# Patient Record
Sex: FEMALE | Race: OTHER | HISPANIC OR LATINO | Employment: UNEMPLOYED | URBAN - METROPOLITAN AREA
[De-identification: names, ages, dates, MRNs, and addresses within clinical notes are randomized per-mention and may not be internally consistent; named-entity substitution may affect disease eponyms.]

---

## 2017-01-20 ENCOUNTER — GENERIC CONVERSION - ENCOUNTER (OUTPATIENT)
Dept: OTHER | Facility: OTHER | Age: 32
End: 2017-01-20

## 2017-01-22 ENCOUNTER — GENERIC CONVERSION - ENCOUNTER (OUTPATIENT)
Dept: OTHER | Facility: OTHER | Age: 32
End: 2017-01-22

## 2017-02-02 ENCOUNTER — ALLSCRIPTS OFFICE VISIT (OUTPATIENT)
Dept: OTHER | Facility: OTHER | Age: 32
End: 2017-02-02

## 2017-04-21 DIAGNOSIS — G40.909 EPILEPSY WITHOUT STATUS EPILEPTICUS, NOT INTRACTABLE (HCC): ICD-10-CM

## 2017-04-21 DIAGNOSIS — R51 HEADACHE(784.0): ICD-10-CM

## 2017-04-21 DIAGNOSIS — M83.5 OTHER DRUG-INDUCED OSTEOMALACIA IN ADULTS: ICD-10-CM

## 2017-04-21 DIAGNOSIS — J32.1 CHRONIC FRONTAL SINUSITIS: ICD-10-CM

## 2017-05-05 ENCOUNTER — GENERIC CONVERSION - ENCOUNTER (OUTPATIENT)
Dept: OTHER | Facility: OTHER | Age: 32
End: 2017-05-05

## 2017-05-05 ENCOUNTER — LAB CONVERSION - ENCOUNTER (OUTPATIENT)
Dept: OTHER | Facility: OTHER | Age: 32
End: 2017-05-05

## 2017-05-05 LAB — CARBAMAZEPINE LEVEL (HISTORICAL): 4.2 MG/L (ref 4–12)

## 2017-05-11 ENCOUNTER — ALLSCRIPTS OFFICE VISIT (OUTPATIENT)
Dept: OTHER | Facility: OTHER | Age: 32
End: 2017-05-11

## 2017-05-11 ENCOUNTER — TRANSCRIBE ORDERS (OUTPATIENT)
Dept: ADMINISTRATIVE | Facility: HOSPITAL | Age: 32
End: 2017-05-11

## 2017-05-11 DIAGNOSIS — J32.1 CHRONIC FRONTAL SINUSITIS: Primary | ICD-10-CM

## 2017-05-11 DIAGNOSIS — R51.9 FACIAL PAIN: ICD-10-CM

## 2017-05-25 ENCOUNTER — GENERIC CONVERSION - ENCOUNTER (OUTPATIENT)
Dept: OTHER | Facility: OTHER | Age: 32
End: 2017-05-25

## 2017-05-25 ENCOUNTER — HOSPITAL ENCOUNTER (OUTPATIENT)
Dept: RADIOLOGY | Facility: HOSPITAL | Age: 32
Discharge: HOME | End: 2017-05-25
Attending: PSYCHIATRY & NEUROLOGY
Payer: COMMERCIAL

## 2017-05-25 DIAGNOSIS — G40.909 EPILEPSY WITHOUT STATUS EPILEPTICUS, NOT INTRACTABLE (HCC): ICD-10-CM

## 2017-05-25 DIAGNOSIS — J32.1 CHRONIC FRONTAL SINUSITIS: ICD-10-CM

## 2017-05-25 DIAGNOSIS — R51 HEADACHE(784.0): ICD-10-CM

## 2017-05-25 PROCEDURE — 70551 MRI BRAIN STEM W/O DYE: CPT

## 2017-07-30 ENCOUNTER — HOSPITAL ENCOUNTER (EMERGENCY)
Facility: HOSPITAL | Age: 32
Discharge: HOME | End: 2017-07-31
Attending: EMERGENCY MEDICINE | Admitting: EMERGENCY MEDICINE
Payer: COMMERCIAL

## 2017-07-30 VITALS
SYSTOLIC BLOOD PRESSURE: 130 MMHG | OXYGEN SATURATION: 98 % | DIASTOLIC BLOOD PRESSURE: 74 MMHG | TEMPERATURE: 98 F | WEIGHT: 135 LBS | HEART RATE: 76 BPM | RESPIRATION RATE: 18 BRPM | BODY MASS INDEX: 27.27 KG/M2

## 2017-07-30 DIAGNOSIS — M54.2 NECK PAIN: Primary | ICD-10-CM

## 2017-07-30 RX ORDER — KETOROLAC TROMETHAMINE 30 MG/ML
15 INJECTION, SOLUTION INTRAMUSCULAR; INTRAVENOUS ONCE
Status: COMPLETED | OUTPATIENT
Start: 2017-07-30 | End: 2017-07-31

## 2017-07-31 ENCOUNTER — APPOINTMENT (EMERGENCY)
Dept: RADIOLOGY | Facility: HOSPITAL | Age: 32
End: 2017-07-31
Payer: COMMERCIAL

## 2017-07-31 PROCEDURE — 96372 THER/PROPH/DIAG INJ SC/IM: CPT

## 2017-07-31 PROCEDURE — 99284 EMERGENCY DEPT VISIT MOD MDM: CPT

## 2017-07-31 PROCEDURE — 72040 X-RAY EXAM NECK SPINE 2-3 VW: CPT

## 2017-07-31 RX ADMIN — KETOROLAC TROMETHAMINE 15 MG: 30 INJECTION, SOLUTION INTRAMUSCULAR at 00:41

## 2017-08-10 ENCOUNTER — ALLSCRIPTS OFFICE VISIT (OUTPATIENT)
Dept: OTHER | Facility: OTHER | Age: 32
End: 2017-08-10

## 2017-08-10 DIAGNOSIS — S16.1XXD STRAIN OF MUSCLE, FASCIA AND TENDON AT NECK LEVEL, SUBSEQUENT ENCOUNTER: ICD-10-CM

## 2017-08-21 ENCOUNTER — APPOINTMENT (OUTPATIENT)
Dept: PHYSICAL THERAPY | Facility: REHABILITATION | Age: 32
End: 2017-08-21
Payer: COMMERCIAL

## 2017-08-21 DIAGNOSIS — S16.1XXD STRAIN OF MUSCLE, FASCIA AND TENDON AT NECK LEVEL, SUBSEQUENT ENCOUNTER: ICD-10-CM

## 2017-08-21 PROCEDURE — 97162 PT EVAL MOD COMPLEX 30 MIN: CPT

## 2017-08-21 PROCEDURE — 97110 THERAPEUTIC EXERCISES: CPT

## 2017-08-22 ENCOUNTER — GENERIC CONVERSION - ENCOUNTER (OUTPATIENT)
Dept: OTHER | Facility: OTHER | Age: 32
End: 2017-08-22

## 2017-08-23 ENCOUNTER — APPOINTMENT (OUTPATIENT)
Dept: PHYSICAL THERAPY | Facility: REHABILITATION | Age: 32
End: 2017-08-23
Payer: COMMERCIAL

## 2017-08-23 PROCEDURE — 97140 MANUAL THERAPY 1/> REGIONS: CPT

## 2017-08-23 PROCEDURE — 97110 THERAPEUTIC EXERCISES: CPT

## 2017-08-29 ENCOUNTER — APPOINTMENT (OUTPATIENT)
Dept: PHYSICAL THERAPY | Facility: REHABILITATION | Age: 32
End: 2017-08-29
Payer: COMMERCIAL

## 2017-08-29 PROCEDURE — 97140 MANUAL THERAPY 1/> REGIONS: CPT

## 2017-08-29 PROCEDURE — 97110 THERAPEUTIC EXERCISES: CPT

## 2017-09-01 ENCOUNTER — APPOINTMENT (OUTPATIENT)
Dept: PHYSICAL THERAPY | Facility: REHABILITATION | Age: 32
End: 2017-09-01
Payer: COMMERCIAL

## 2017-09-05 ENCOUNTER — APPOINTMENT (OUTPATIENT)
Dept: PHYSICAL THERAPY | Facility: REHABILITATION | Age: 32
End: 2017-09-05
Payer: COMMERCIAL

## 2017-09-05 PROCEDURE — 97140 MANUAL THERAPY 1/> REGIONS: CPT

## 2017-09-05 PROCEDURE — 97110 THERAPEUTIC EXERCISES: CPT

## 2017-09-08 ENCOUNTER — APPOINTMENT (OUTPATIENT)
Dept: PHYSICAL THERAPY | Facility: REHABILITATION | Age: 32
End: 2017-09-08
Payer: COMMERCIAL

## 2017-09-08 PROCEDURE — 97140 MANUAL THERAPY 1/> REGIONS: CPT

## 2017-09-08 PROCEDURE — 97110 THERAPEUTIC EXERCISES: CPT

## 2017-09-12 ENCOUNTER — APPOINTMENT (OUTPATIENT)
Dept: PHYSICAL THERAPY | Facility: REHABILITATION | Age: 32
End: 2017-09-12
Payer: COMMERCIAL

## 2017-09-12 PROCEDURE — 97140 MANUAL THERAPY 1/> REGIONS: CPT

## 2017-09-12 PROCEDURE — 97110 THERAPEUTIC EXERCISES: CPT

## 2017-09-15 ENCOUNTER — APPOINTMENT (OUTPATIENT)
Dept: PHYSICAL THERAPY | Facility: REHABILITATION | Age: 32
End: 2017-09-15
Payer: COMMERCIAL

## 2017-09-18 ENCOUNTER — APPOINTMENT (OUTPATIENT)
Dept: PHYSICAL THERAPY | Facility: REHABILITATION | Age: 32
End: 2017-09-18
Payer: COMMERCIAL

## 2017-09-18 ENCOUNTER — GENERIC CONVERSION - ENCOUNTER (OUTPATIENT)
Dept: OTHER | Facility: OTHER | Age: 32
End: 2017-09-18

## 2017-09-18 PROCEDURE — 97140 MANUAL THERAPY 1/> REGIONS: CPT

## 2017-09-18 PROCEDURE — G8990 OTHER PT/OT CURRENT STATUS: HCPCS

## 2017-09-18 PROCEDURE — G8991 OTHER PT/OT GOAL STATUS: HCPCS

## 2017-09-18 PROCEDURE — G8992 OTHER PT/OT  D/C STATUS: HCPCS

## 2017-09-18 PROCEDURE — 97110 THERAPEUTIC EXERCISES: CPT

## 2017-09-21 ENCOUNTER — GENERIC CONVERSION - ENCOUNTER (OUTPATIENT)
Dept: OTHER | Facility: OTHER | Age: 32
End: 2017-09-21

## 2017-09-21 ENCOUNTER — APPOINTMENT (OUTPATIENT)
Dept: LAB | Facility: CLINIC | Age: 32
End: 2017-09-21
Payer: COMMERCIAL

## 2017-09-21 ENCOUNTER — ALLSCRIPTS OFFICE VISIT (OUTPATIENT)
Dept: OTHER | Facility: OTHER | Age: 32
End: 2017-09-21

## 2017-09-21 DIAGNOSIS — G40.909 EPILEPSY WITHOUT STATUS EPILEPTICUS, NOT INTRACTABLE (HCC): ICD-10-CM

## 2017-09-21 DIAGNOSIS — M83.5 OTHER DRUG-INDUCED OSTEOMALACIA IN ADULTS: ICD-10-CM

## 2017-09-21 LAB — CARBAMAZEPINE SERPL-MCNC: 8.2 UG/ML (ref 4–12)

## 2017-09-21 PROCEDURE — 80156 ASSAY CARBAMAZEPINE TOTAL: CPT

## 2017-09-21 PROCEDURE — 82785 ASSAY OF IGE: CPT

## 2017-09-21 PROCEDURE — 86003 ALLG SPEC IGE CRUDE XTRC EA: CPT

## 2017-09-21 PROCEDURE — 36415 COLL VENOUS BLD VENIPUNCTURE: CPT

## 2017-09-22 LAB
A ALTERNATA IGE QN: <0.1 KUA/I
A FUMIGATUS IGE QN: <0.1 KUA/I
ALLERGEN COMMENT: ABNORMAL
BERMUDA GRASS IGE QN: <0.1 KUA/I
BOXELDER IGE QN: <0.1 KUA/I
C HERBARUM IGE QN: <0.1 KUA/I
CAT DANDER IGE QN: <0.1 KUA/I
CMN PIGWEED IGE QN: 0.15 KUA/I
COMMON RAGWEED IGE QN: <0.1 KUA/I
COTTONWOOD IGE QN: <0.1 KUA/I
D FARINAE IGE QN: 0.18 KUA/I
D PTERONYSS IGE QN: 0.11 KUA/I
DOG DANDER IGE QN: 0.12 KUA/I
LONDON PLANE IGE QN: <0.1 KUA/I
MOUSE URINE PROT IGE QN: <0.1 KUA/I
MT JUNIPER IGE QN: 0.1 KUA/I
MUGWORT IGE QN: <0.1 KUA/I
P NOTATUM IGE QN: <0.1 KUA/I
ROACH IGE QN: 0.37 KUA/I
SHEEP SORREL IGE QN: <0.1 KUA/I
SILVER BIRCH IGE QN: <0.1 KUA/I
TIMOTHY IGE QN: <0.1 KUA/I
TOTAL IGE SMQN RAST: 22.9 KU/L (ref 0–113)
WALNUT IGE QN: <0.1 KUA/I
WHITE ASH IGE QN: <0.1 KUA/I
WHITE ELM IGE QN: <0.1 KUA/I
WHITE MULBERRY IGE QN: <0.1 KUA/I
WHITE OAK IGE QN: 0.13 KUA/I

## 2017-10-12 ENCOUNTER — GENERIC CONVERSION - ENCOUNTER (OUTPATIENT)
Dept: OTHER | Facility: OTHER | Age: 32
End: 2017-10-12

## 2017-10-18 ENCOUNTER — TRANSCRIBE ORDERS (OUTPATIENT)
Dept: ADMINISTRATIVE | Facility: HOSPITAL | Age: 32
End: 2017-10-18

## 2017-10-18 DIAGNOSIS — J32.9 CHRONIC SINUSITIS, UNSPECIFIED LOCATION: Primary | ICD-10-CM

## 2017-10-26 ENCOUNTER — ALLSCRIPTS OFFICE VISIT (OUTPATIENT)
Dept: OTHER | Facility: OTHER | Age: 32
End: 2017-10-26

## 2017-10-26 ENCOUNTER — GENERIC CONVERSION - ENCOUNTER (OUTPATIENT)
Dept: OTHER | Facility: OTHER | Age: 32
End: 2017-10-26

## 2017-10-26 DIAGNOSIS — G43.809 OTHER MIGRAINE, NOT INTRACTABLE, WITHOUT STATUS MIGRAINOSUS: ICD-10-CM

## 2017-10-26 DIAGNOSIS — M83.5 OTHER DRUG-INDUCED OSTEOMALACIA IN ADULTS: ICD-10-CM

## 2017-10-26 DIAGNOSIS — G40.909 EPILEPSY WITHOUT STATUS EPILEPTICUS, NOT INTRACTABLE (HCC): ICD-10-CM
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
## 2017-10-31 ENCOUNTER — HOSPITAL ENCOUNTER (OUTPATIENT)
Dept: RADIOLOGY | Facility: HOSPITAL | Age: 32
Discharge: HOME | End: 2017-10-31
Attending: OTOLARYNGOLOGY
Payer: COMMERCIAL

## 2017-10-31 DIAGNOSIS — J32.9 CHRONIC SINUSITIS, UNSPECIFIED LOCATION: ICD-10-CM

## 2017-10-31 PROCEDURE — 70486 CT MAXILLOFACIAL W/O DYE: CPT

## 2018-01-11 NOTE — RESULT NOTES
Verified Results  (Q) CARBAMAZEPINE AND  METABOLITE FREE [id number] 09:39AM Spenser Bartlett     Test Name Result Flag Reference   CARBAMAZEPINE - FREE 1 6 mcg/mL  1 0-3 0   CARBAMAZEPINE METABOLITE 0 7 mcg/mL  0 1-1 0   10, 11 Epoxide-Free     (Q) CBC (H/H, RBC, INDICES, WBC, PLT) 46EBI5058 09:39AM Spenser Bartlett     Test Name Result Flag Reference   WHITE BLOOD CELL COUNT 6 1 Thousand/uL  3 8-10 8   RED BLOOD CELL COUNT 4 35 Million/uL  3 80-5 10   HEMOGLOBIN 13 3 g/dL  11 7-15 5   HEMATOCRIT 40 1 %  35 0-45 0   MCV 92 2 fL  80 0-100 0   MCH 30 6 pg  27 0-33 0   MCHC 33 2 g/dL  32 0-36 0   RDW 14 3 %  11 0-15 0   PLATELET COUNT 656 Thousand/uL  140-400   MPV 8 1 fL  7 5-11 5     (Q) COMPREHENSIVE METABOLIC PANEL W/O eGFR [id number] 09:39AM Spenser Bartlett     Test Name Result Flag Reference   GLUCOSE 88 mg/dL  65-99   Fasting reference interval   UREA NITROGEN (BUN) 8 mg/dL  7-25   CREATININE 0 57 mg/dL  0 50-1 10   BUN/CREATININE RATIO   1-30   NOT APPLICABLE (calc)   SODIUM 138 mmol/L  135-146   POTASSIUM 3 8 mmol/L  3 5-5 3   ALBUMIN/GLOBULIN RATIO 1 6 (calc)  1 0-2 5   BILIRUBIN, TOTAL 0 3 mg/dL  0 2-1 2   ALKALINE PHOSPHATASE 100 U/L     AST 14 U/L  10-30   ALT 10 U/L  6-29   CHLORIDE 106 mmol/L     CARBON DIOXIDE 23 mmol/L  20-31   CALCIUM 9 3 mg/dL  8 6-10 2   PROTEIN, TOTAL 7 2 g/dL  6 1-8 1   ALBUMIN 4 4 g/dL  3 6-5 1   GLOBULIN 2 8 g/dL (calc)  1 9-3 7     *(Q) VITAMIN D, 25-HYDROXY, LC/MS/MS [id number] 09:39AM Maira Oviedo   REPORT COMMENT:  FASTING:NO  AN UPDATE OR CORRECTION HAS BEEN MADE TO NAME     Test Name Result Flag Reference   VITAMIN D, 25-OH, TOTAL 28 ng/mL L    Vitamin D Status         25-OH Vitamin D:     Deficiency:                    <20 ng/mL  Insufficiency:             20 - 29 ng/mL  Optimal:                 > or = 30 ng/mL     For 25-OH Vitamin D testing on patients on   D2-supplementation and patients for whom quantitation   of D2 and D3 fractions is required, the [facility]()  25-OH VIT D, (D2,D3), LC/MS/MS is recommended: order   code 04937 (patients >2yrs)  For more information on this test, go to:  http://[employer]/faq/NPR303  (This link is being provided for   informational/educational purposes only )

## 2018-01-11 NOTE — RESULT NOTES
Verified Results  * MRI BRAIN WO CONTRAST [id number] 11:05AM Ivan Pierce Order Number: [id number]   Performing Comments: 28year old woman with worsening headaches especially associated with allergies and sinusitis  Rule out structural causes of severe frontal headaches and sinus infection   - Patient Instructions: To schedule this appointment, please    contact Central Scheduling at [phone]  Test Name Result Flag Reference   MRI BRAIN WO CONTRAST (Report)     MRI BRAIN WITHOUT CONTRAST     INDICATION: G40 909: Epilepsy, unspecified, not intractable, without status epilepticus J32 1: Chronic frontal sinusitis R51: Headache  History taken directly from the electronic ordering system  COMPARISON:  MRI performed on 3/24/2008  CT performed on 4/5/2005     TECHNIQUE: Sagittal T1, axial T2, axial FLAIR, axial T1, axial [place] and axial diffusion imaging  IMAGE QUALITY: Diagnostic  FINDINGS:     BRAIN PARENCHYMA:    There are no areas of restricted diffusion  No midline shift, mass effect, or extra-axial collection is identified  No areas of gradient susceptibility to suggest blood product deposition  No substantial white matter changes are seen  Cerebral volume is within normal limits for age  VENTRICLES: The ventricles are normal in size and contour  VASCULATURE: Major arterial and dural venous flow voids are preserved by spin echo criteria  ORBITS: Normal      PARANASAL SINUSES: Mucosal thickening the left anterior ethmoid air cells extending to the left frontal sinus  No obvious obstructing mass  EXTRACRANIAL SOFT TISSUES: Normal      SELLA AND PITUITARY GLAND: Hypothalamic and pituitary region are grossly normal       CALVARIUM AND SKULL BASE: Craniocervical junction is within normal limits  The overall marrow signal is heterogeneous but likely in keeping with the patient's age  IMPRESSION:     No acute intracranial abnormality       No structural/morphologic abnormality  Mild left anterior ethmoid and maxillary sinus disease         Workstation performed: [id number]     Signed by:   Jacqueline Luong MD   5/25/17

## 2018-01-11 NOTE — RESULT NOTES
Verified Results  *(Q) VITAMIN D, 25-HYDROXY, LC/MS/MS [id number] 09:39AM Matty Oviedo   REPORT COMMENT:  FASTING:NO  AN UPDATE OR CORRECTION HAS BEEN MADE TO NAME     Test Name Result Flag Reference   VITAMIN D, 25-OH, TOTAL 28 ng/mL L    Vitamin D Status         25-OH Vitamin D:     Deficiency:                    <20 ng/mL  Insufficiency:             20 - 29 ng/mL  Optimal:                 > or = 30 ng/mL     For 25-OH Vitamin D testing on patients on   D2-supplementation and patients for whom quantitation   of D2 and D3 fractions is required, the QuestAssureD(TM)  25-OH VIT D, (D2,D3), LC/MS/MS is recommended: order   code 84534 (patients >2yrs)  For more information on this test, go to:  http://[employer]/faq/MDR453  (This link is being provided for   informational/educational purposes only )     (Q) CBC (H/H, RBC, INDICES, WBC, PLT) [id number] 09:39AM Brittany Conroy     Test Name Result Flag Reference   WHITE BLOOD CELL COUNT 6 1 Thousand/uL  3 8-10 8   RED BLOOD CELL COUNT 4 35 Million/uL  3 80-5 10   HEMOGLOBIN 13 3 g/dL  11 7-15 5   HEMATOCRIT 40 1 %  35 0-45 0   MCV 92 2 fL  80 0-100 0   MCH 30 6 pg  27 0-33 0   MCHC 33 2 g/dL  32 0-36 0   RDW 14 3 %  11 0-15 0   PLATELET COUNT 741 Thousand/uL  140-400   MPV 8 1 fL  7 5-11 5     (Q) COMPREHENSIVE METABOLIC PANEL W/O eGFR [id number] 09:39AM Brittany Conroy     Test Name Result Flag Reference   GLUCOSE 88 mg/dL  65-99   Fasting reference interval   UREA NITROGEN (BUN) 8 mg/dL  7-25   CREATININE 0 57 mg/dL  0 50-1 10   BUN/CREATININE RATIO   2-04   NOT APPLICABLE (calc)   SODIUM 138 mmol/L  135-146   POTASSIUM 3 8 mmol/L  3 5-5 3   CHLORIDE 106 mmol/L     CARBON DIOXIDE 23 mmol/L  20-31   CALCIUM 9 3 mg/dL  8 6-10 2   PROTEIN, TOTAL 7 2 g/dL  6 1-8 1   ALBUMIN 4 4 g/dL  3 6-5 1   GLOBULIN 2 8 g/dL (calc)  1 9-3 7   ALBUMIN/GLOBULIN RATIO 1 6 (calc)  1 0-2 5   BILIRUBIN, TOTAL 0 3 mg/dL  0 2-1 2   ALKALINE PHOSPHATASE 100 U/L     AST 14 U/L  10-30   ALT 10 U/L  6-29

## 2018-01-11 NOTE — RESULT NOTES
Verified Results  (1) COMPREHENSIVE METABOLIC PANEL [id number] 66:08MY [employer]     Test Name Result Flag Reference   GLUCOSE 79 mg/dL  65-99   Fasting reference interval   UREA NITROGEN (BUN) 10 mg/dL  7-25   CREATININE 0 62 mg/dL  0 50-1 10   eGFR NON-AFR   AMERICAN 122 mL/min/1 73m2  > OR = 60   eGFR AFRICAN AMERICAN 141 mL/min/1 73m2  > OR = 60   BUN/CREATININE RATIO   9-51   NOT APPLICABLE (calc)   SODIUM 140 mmol/L  135-146   POTASSIUM 3 8 mmol/L  3 5-5 3   CHLORIDE 106 mmol/L     CARBON DIOXIDE 23 mmol/L  19-30   CALCIUM 9 5 mg/dL  8 6-10 2   PROTEIN, TOTAL 7 1 g/dL  6 1-8 1   ALBUMIN 4 4 g/dL  3 6-5 1   GLOBULIN 2 7 g/dL (calc)  1 9-3 7   ALBUMIN/GLOBULIN RATIO 1 6 (calc)  1 0-2 5   BILIRUBIN, TOTAL 0 5 mg/dL  0 2-1 2   ALKALINE PHOSPHATASE 82 U/L     AST 18 U/L  10-30   ALT 12 U/L  6-29     (1) CBC/PLT/DIFF [id number] 09:39AM [employer]     Test Name Result Flag Reference   WHITE BLOOD CELL COUNT 6 3 Thousand/uL  3 8-10 8   RED BLOOD CELL COUNT 4 18 Million/uL  3 80-5 10   HEMOGLOBIN 13 0 g/dL  11 7-15 5   HEMATOCRIT 39 1 %  35 0-45 0   MCV 93 6 fL  80 0-100 0   MCH 31 0 pg  27 0-33 0   MCHC 33 2 g/dL  32 0-36 0   RDW 14 8 %  11 0-15 0   PLATELET COUNT 226 Thousand/uL  140-400   MPV 8 1 fL  7 5-11 5   ABSOLUTE NEUTROPHILS 3862 cells/uL  1291-2042   ABSOLUTE LYMPHOCYTES 1714 cells/uL  850-3900   ABSOLUTE MONOCYTES 605 cells/uL  200-950   ABSOLUTE EOSINOPHILS 76 cells/uL     ABSOLUTE BASOPHILS 44 cells/uL  0-200   NEUTROPHILS 61 3 %     LYMPHOCYTES 27 2 %     MONOCYTES 9 6 %     EOSINOPHILS 1 2 %     BASOPHILS 0 7 %       (Q) LAMOTRIGINE [id number] 09:39AM [employer]     Test Name Result Flag Reference   LAMOTRIGINE 3 4 mcg/mL L 4 0-18 0     *(Q) VITAMIN D, 25-HYDROXY, LC/MS/MS [id number] 09:39AM Adela Crespo   REPORT COMMENT:  FASTING:NO     Test Name Result Flag Reference   VITAMIN D, 25-OH, TOTAL 33 ng/mL     Vitamin D Status         25-OH Vitamin D:     Deficiency: <20 ng/mL  Insufficiency:             20 - 29 ng/mL  Optimal:                 > or = 30 ng/mL     For 25-OH Vitamin D testing on patients on   D2-supplementation and patients for whom quantitation   of D2 and D3 fractions is required, the QuestAssureD(TM)  25-OH VIT D, (D2,D3), LC/MS/MS is recommended: order   code 43192 (patients >2yrs)  For more information on this test, go to:  http://education  [employer]/faq/PVV170  (This link is being provided for   informational/educational purposes only )

## 2018-01-12 NOTE — PROCEDURES
Procedures by Gisela Yanez MD at 2016   1:59 PM      Author:  Gisela Yanez MD Service:  Neurology Author Type:  Physician     Filed:  2016  2:09 PM Date of Service:  2016  1:59 PM Status:  Signed     :  Gisela Yanez MD (Physician)            ELECTROENCEPHALOGRAM    Patient Name:  Sonja Pinzon  MRN: [id number]   :  1985 File #: [id number]    Age: 35 y o  Encounter #: [id number]   Date performed: 2016 Referring Provider: Dr Gisela Yanez          Report date: 2016          Study type: awake, drowsy, and asleep EEG, greater than 1 hour EEG    ICD 10 diagnosis: Other Epilepsy G40 909 and Spells/Fit NOS R56 9    Patient History:  EEG is requested to assess for seizures and/or classification of epilepsy  Patient is 35 y o  female with intellectual delay, last seizure was in , she had been on carbamazepine  However, neurologist transitioned her to newer antiepileptic  medication but she had side effects including skin irritation, weight gain, irritability, and depression  She has been weaning off of lamotrigine for this study  She had a normal ambulatory EEG in 2015  Current AEDs: lamotrigine (low dose)  Medications include: Vitamin D, Folic acid, loratidine    Description of Procedure: The EEG was performed with electrodes applied using the International 10-20 System  Additional electrodes used included EOG, ECG and T1/T2 electrodes  A single lead ECG channel is also  present  At least 16 channels are reviewed at a time  and formatted into longitudinal bipolar, transverse bipolar, and referential (to common reference or calculated common reference) montages  The EEG was recorded  with the patient awake, drowsy, and asleep  The recording was technically satisfactory  EEG was recorded from 08:38 to 10:54  Findings:   Background Activity: The background is grossly symmetric with respect to voltages and activities    During wakefulness, the background is well-organized with anterior low amplitude beta activity and low-moderate amplitude  posterior alpha activity  There is a symmetric 8 5-9 Hz posterior dominant rhythm that attenuates with eye opening  Drowsiness is characterized by attenuation of the alpha rhythm, prominence of anterior beta, central theta activity, and presence of vertex waves  Stage 2 sleep is characterized by symmetric sleep spindles and K-complexes  Activation Procedures:   Hyperventilation was performed with good effort up to 4 minutes and did not produce any abnormalities  Stepped photic stimulation from 1 to 30 fps was performed and produced no abnormality  Abnormal Findings: There are rare spike-slow wave complexes maximal over C3 with a field over T3 during sleep (example 08:59:47, 09:23:13, and 09:24:55, best example); these complexes are asymmetric and has a  field over the left centrotemporal region  Other findings: The single lead ECG shows a regular sinus cardiac rhythm  Interpretation: This is an abnormal 136 minutes awake, drowsy, and asleep EEG due to the rare presence of focal spike slow wave discharges over the left central-temporal region  This finding indicates the presence of a potentially epileptogenic focus over the left central vertex or left centrotemporal region  Deyanira Hannah MD  [facility]  Teodoro TANNER    Jul 8 2016  2:08PM [facility] Standard Time

## 2018-01-12 NOTE — PROGRESS NOTES
Message    Contact: Initial Contact   Last Office Visit: 8/10/17   Spoke to Patient and Parent   pt's mother, Reji Whalen also came into my office to clarify need for this request        The reason for call is to discuss outreach for follow up/needed services and coordination of meeting care plan treatment goals  Haven Norse  Please assist our mutual client, Kia Hernandes to obtain a "tablet/ computer" to assist with improving her communication and speech skills  Dustin ( [birth year]) has Mental retardation and language issues  This tablet would help her with her "homework, learning her ABC's"- as Aravind Fontenot today told me, and yes, improve her communication skills  If you require any additional information from me, please do not hesitate to contact me at [facility] [place] #[phone]  Thank you for your anticipated cooperation to try to assist our patient, Ruam          Patient Care Team    Care Team Member Role Specialty Office Number   Phi Garcia [address] [place] [address]  Family Medicine [phone]   Brenda Nur   [phone]     Signatures   Electronically signed by : Prakash Wren [facility]; Oct 26 2017 12:31PM EST                       (Author)

## 2018-01-13 VITALS
SYSTOLIC BLOOD PRESSURE: 114 MMHG | HEART RATE: 77 BPM | BODY MASS INDEX: 26.7 KG/M2 | DIASTOLIC BLOOD PRESSURE: 57 MMHG | WEIGHT: 132.19 LBS

## 2018-01-13 VITALS
SYSTOLIC BLOOD PRESSURE: 128 MMHG | RESPIRATION RATE: 18 BRPM | BODY MASS INDEX: 28.4 KG/M2 | HEART RATE: 81 BPM | WEIGHT: 135.31 LBS | DIASTOLIC BLOOD PRESSURE: 63 MMHG | HEIGHT: 58 IN

## 2018-01-14 VITALS
SYSTOLIC BLOOD PRESSURE: 116 MMHG | DIASTOLIC BLOOD PRESSURE: 70 MMHG | HEART RATE: 80 BPM | TEMPERATURE: 98.1 F | HEIGHT: 58 IN | WEIGHT: 130.51 LBS | BODY MASS INDEX: 27.4 KG/M2

## 2018-01-14 VITALS
DIASTOLIC BLOOD PRESSURE: 68 MMHG | BODY MASS INDEX: 27.97 KG/M2 | WEIGHT: 133.25 LBS | HEIGHT: 58 IN | SYSTOLIC BLOOD PRESSURE: 108 MMHG | RESPIRATION RATE: 16 BRPM | HEART RATE: 80 BPM

## 2018-01-14 NOTE — RESULT NOTES
Verified Results  (1) COMPREHENSIVE METABOLIC PANEL [id number] 08:13AO Arturo Min     Test Name Result Flag Reference   GLUCOSE 79 mg/dL  65-99   Fasting reference interval   UREA NITROGEN (BUN) 6 mg/dL L 7-25   CREATININE 0 54 mg/dL  0 50-1 10   eGFR NON-AFR   AMERICAN 127 mL/min/1 73m2  > OR = 60   eGFR AFRICAN AMERICAN 147 mL/min/1 73m2  > OR = 60   BUN/CREATININE RATIO 11 (calc)  6-22   SODIUM 138 mmol/L  135-146   POTASSIUM 4 0 mmol/L  3 5-5 3   CHLORIDE 104 mmol/L     CARBON DIOXIDE 28 mmol/L  20-31   CALCIUM 9 3 mg/dL  8 6-10 2   PROTEIN, TOTAL 7 2 g/dL  6 1-8 1   ALBUMIN 4 2 g/dL  3 6-5 1   GLOBULIN 3 0 g/dL (calc)  1 9-3 7   ALBUMIN/GLOBULIN RATIO 1 4 (calc)  1 0-2 5   BILIRUBIN, TOTAL 0 4 mg/dL  0 2-1 2   ALKALINE PHOSPHATASE 85 U/L     AST 16 U/L  10-30   ALT 10 U/L  6-29     (Q) LAMOTRIGINE 25Oct2016 09:58AM Arturo Min     Test Name Result Flag Reference   LAMOTRIGINE 5 8 mcg/mL  4 0-18 0     (1) CBC/PLT/DIFF 25Oct2016 09:58AM Arturo Min     Test Name Result Flag Reference   WHITE BLOOD CELL COUNT 5 3 Thousand/uL  3 8-10 8   RED BLOOD CELL COUNT 4 37 Million/uL  3 80-5 10   HEMOGLOBIN 13 3 g/dL  11 7-15 5   HEMATOCRIT 40 1 %  35 0-45 0   MCV 91 8 fL  80 0-100 0   MCH 30 3 pg  27 0-33 0   MCHC 33 0 g/dL  32 0-36 0   RDW 14 4 %  11 0-15 0   PLATELET COUNT 640 Thousand/uL  140-400   MPV 8 5 fL  7 5-11 5   ABSOLUTE NEUTROPHILS 2963 cells/uL  6690-5247   ABSOLUTE LYMPHOCYTES 1670 cells/uL  850-3900   ABSOLUTE MONOCYTES 504 cells/uL  200-950   ABSOLUTE EOSINOPHILS 117 cells/uL     ABSOLUTE BASOPHILS 48 cells/uL  0-200   NEUTROPHILS 55 9 %     LYMPHOCYTES 31 5 %     MONOCYTES 9 5 %     EOSINOPHILS 2 2 %     BASOPHILS 0 9 %

## 2018-01-16 NOTE — RESULT NOTES
Verified Results  (1) COMPREHENSIVE METABOLIC PANEL [id number] 71:32AY [employer]     Test Name Result Flag Reference   GLUCOSE 79 mg/dL  65-99   Fasting reference interval   UREA NITROGEN (BUN) 6 mg/dL L 7-25   CREATININE 0 54 mg/dL  0 50-1 10   eGFR NON-AFR   AMERICAN 127 mL/min/1 73m2  > OR = 60   eGFR AFRICAN AMERICAN 147 mL/min/1 73m2  > OR = 60   BUN/CREATININE RATIO 11 (calc)  6-22   SODIUM 138 mmol/L  135-146   POTASSIUM 4 0 mmol/L  3 5-5 3   CHLORIDE 104 mmol/L     CARBON DIOXIDE 28 mmol/L  20-31   CALCIUM 9 3 mg/dL  8 6-10 2   PROTEIN, TOTAL 7 2 g/dL  6 1-8 1   ALBUMIN 4 2 g/dL  3 6-5 1   GLOBULIN 3 0 g/dL (calc)  1 9-3 7   ALBUMIN/GLOBULIN RATIO 1 4 (calc)  1 0-2 5   BILIRUBIN, TOTAL 0 4 mg/dL  0 2-1 2   ALKALINE PHOSPHATASE 85 U/L     AST 16 U/L  10-30   ALT 10 U/L  6-29     (1) CBC/PLT/DIFF 25Oct2016 09:58AM [employer]     Test Name Result Flag Reference   WHITE BLOOD CELL COUNT 5 3 Thousand/uL  3 8-10 8   RED BLOOD CELL COUNT 4 37 Million/uL  3 80-5 10   HEMOGLOBIN 13 3 g/dL  11 7-15 5   HEMATOCRIT 40 1 %  35 0-45 0   MCV 91 8 fL  80 0-100 0   MCH 30 3 pg  27 0-33 0   MCHC 33 0 g/dL  32 0-36 0   RDW 14 4 %  11 0-15 0   PLATELET COUNT 711 Thousand/uL  140-400   MPV 8 5 fL  7 5-11 5   ABSOLUTE NEUTROPHILS 2963 cells/uL  7400-8161   ABSOLUTE LYMPHOCYTES 1670 cells/uL  850-3900   ABSOLUTE MONOCYTES 504 cells/uL  200-950   ABSOLUTE EOSINOPHILS 117 cells/uL     ABSOLUTE BASOPHILS 48 cells/uL  0-200   NEUTROPHILS 55 9 %     LYMPHOCYTES 31 5 %     MONOCYTES 9 5 %     EOSINOPHILS 2 2 %     BASOPHILS 0 9 %

## 2018-01-16 NOTE — RESULT NOTES
Verified Results  (Q) LAMOTRIGINE [id number] 09:56AM Ashleigh Velasquez     Test Name Result Flag Reference   LAMOTRIGINE 10 4 mcg/mL  4 0-18 0

## 2018-01-16 NOTE — RESULT NOTES
Verified Results  (1) TEGRETOL(CARBAMAZEPINE) [id number] 12:30PM Lisa Corral Order Number: [id number]     Test Name Result Flag Reference   CARBAMAZEPINE 8 2 ug/mL  4 0-12 0

## 2018-01-18 NOTE — RESULT NOTES
Verified Results  (Q) CARBAMAZEPINE, TOTAL [id number] 10:48AM Viviana Ontiveros   REPORT COMMENT:  FASTING:YES     Test Name Result Flag Reference   CARBAMAZEPINE, TOTAL 4 2 mg/L  4 0-12 0

## 2018-03-12 ENCOUNTER — TELEPHONE (OUTPATIENT)
Dept: NEUROLOGY | Facility: CLINIC | Age: 33
End: 2018-03-12

## 2018-03-12 NOTE — TELEPHONE ENCOUNTER
Called patient to reschedule 4/26/18 appointment per Dr Andrew Marie studies and tasks  Dr Andrew Marie needs 4/26/18 afternoon free  Please offer patient the 8:00am spot on 4/26/18

## 2018-03-13 ENCOUNTER — TELEPHONE (OUTPATIENT)
Dept: NEUROLOGY | Facility: CLINIC | Age: 33
End: 2018-03-13

## 2018-03-14 ENCOUNTER — TELEPHONE (OUTPATIENT)
Dept: NEUROLOGY | Facility: CLINIC | Age: 33
End: 2018-03-14

## 2018-03-24 DIAGNOSIS — E55.9 VITAMIN D INSUFFICIENCY: Primary | ICD-10-CM

## 2018-03-26 PROBLEM — J32.1 CHRONIC FRONTAL SINUSITIS: Status: ACTIVE | Noted: 2017-05-11

## 2018-03-26 PROBLEM — M62.838 TRAPEZIUS MUSCLE SPASM: Status: ACTIVE | Noted: 2017-08-10

## 2018-03-26 PROBLEM — G43.809 HEADACHE, VARIANT MIGRAINE: Status: ACTIVE | Noted: 2017-05-11

## 2018-03-26 RX ORDER — GLUCOSAMINE/CHONDR SU A SOD 750-600 MG
TABLET ORAL
Qty: 60 CAPSULE | Refills: 5 | Status: SHIPPED | OUTPATIENT
Start: 2018-03-26 | End: 2018-05-17

## 2018-04-03 PROBLEM — J32.1 CHRONIC FRONTAL SINUSITIS: Status: RESOLVED | Noted: 2017-05-11 | Resolved: 2018-04-03

## 2018-04-03 PROBLEM — M62.838 TRAPEZIUS MUSCLE SPASM: Status: RESOLVED | Noted: 2017-08-10 | Resolved: 2018-04-03

## 2018-04-03 RX ORDER — ACETAMINOPHEN 160 MG
2 TABLET,DISINTEGRATING ORAL DAILY
COMMUNITY
Start: 2017-09-11 | End: 2018-05-17 | Stop reason: SDUPTHER

## 2018-04-03 RX ORDER — FOLIC ACID 1 MG/1
1 TABLET ORAL DAILY
COMMUNITY
End: 2018-04-21 | Stop reason: SDUPTHER

## 2018-04-03 RX ORDER — CARBAMAZEPINE 300 MG/1
CAPSULE, EXTENDED RELEASE ORAL
COMMUNITY
Start: 2016-12-15 | End: 2018-05-17 | Stop reason: SDUPTHER

## 2018-04-03 RX ORDER — TOPIRAMATE 25 MG/1
2 TABLET ORAL
COMMUNITY
Start: 2017-05-11 | End: 2018-05-17 | Stop reason: SDUPTHER

## 2018-04-03 RX ORDER — FLUTICASONE PROPIONATE 50 MCG
1 SPRAY, SUSPENSION (ML) NASAL 2 TIMES DAILY
COMMUNITY
Start: 2011-12-13 | End: 2018-04-03 | Stop reason: SDUPTHER

## 2018-04-03 NOTE — PROGRESS NOTES
Assessment/Plan:    Your allergy medications have been refilled  Please start the Flonase within the next 2 weeks so that will be effective prior to allergy season  Please get the blood test completed that were ordered by your neurologist prior to your visit  You will call to reschedule the appt  Please use that clear nail Polish on the belt as well as the snap on her jeans  This will cut down on the rash  Use a very small amount of the cream on the rash only if needed and please remember to wash hands after use  Physical form completed during this visit and a copy was provided to her mother  The original is placed in been for scanning into her record and faxing  No problem-specific Assessment & Plan notes found for this encounter  Diagnoses and all orders for this visit:    Chronic nonseasonal allergic rhinitis due to pollen  -     loratadine (CLARITIN) 10 mg tablet; Take 1 tablet (10 mg total) by mouth daily for 180 days  -     fluticasone (FLONASE) 50 mcg/act nasal spray; 1 spray into each nostril 2 (two) times a day for 180 days    Headache, variant migraine    Cognitive developmental delay    Nonintractable epilepsy without status epilepticus, unspecified epilepsy type ([facility] )    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    Vitamin D insufficiency    Need for influenza vaccination    Allergic contact dermatitis due to metals  -     hydrocortisone 1 % cream; Apply topically 3 (three) times a day for 7 days    Other orders  -     Cancel: Flu vaccine greater than or equal to 2yo preservative free IM          Subjective:      Patient ID: Dequan Golden is a 33 y o  female  Patient coming in for routine checkup as transfer from [address] [place] [address]  Patient has a medical history significant for cognitive developmental delay, headaches, and epilepsy  Patient is currently followed by the neurologist for her seizures and headaches    It is noted at her last Neurology appointment in October that she has been seizure free for approximately 20 years  Patient is scheduled with the neurologist for follow-up on April 26th  States was called to cancer as  not in office, will call to reschedule  Patient is receiving vitamin-D supplementation through the neurologist secondary to her seizure medications  Patient did have allergy testing as ordered by the ENT in September of 2017 and was positive for multiple trees confirming her seasonal allergies  Patient also completed the CT scan of her sinuses in October as ordered by the ENT as well  This showed very mild sinus thickening and was advised to continue with her allergy medications  Patient has an outstanding lab order from her neurologist to get labs completed  Will remind mother to make sure she get these completed prior to her follow-up visit in April  These labs would cover any routine labs I would need,   Therefore there is no additional labs I will be ordering at this visit  It is noted that patient is not sexually active and therefore has not been following with GYN      Patient reports other then the small rash on her abdomen at her belt line and has noted she is starting to get some sneezing she has been feeling well  Patient reports has not had any seizures since last visit with the neurologist when it was noted that she has not had a seizure in 20 years  Mother and patient report eating well and sleeping well  The following portions of the patient's history were reviewed and updated as appropriate: allergies, current medications, past family history, past medical history, past social history, past surgical history and problem list     Review of Systems   Constitutional: Negative for activity change and appetite change  HENT: Negative for nosebleeds  Has noticed started to get some itching in her nose and sneezing  Eyes: Negative for discharge and itching  Respiratory: Negative      Cardiovascular: Negative  Gastrointestinal: Negative  Endocrine: Negative  Musculoskeletal: Negative  Skin: Positive for rash  As noted in HPI   Neurological: Positive for seizures  As noted in HPI   Psychiatric/Behavioral: Positive for decreased concentration  Negative for self-injury and suicidal ideas  Objective:      /80 (BP Location: Left arm, Patient Position: Sitting, Cuff Size: Standard)   Pulse 80   Temp 97 5 °F (36 4 °C) (Oral)   Ht 4' 9 5" (1 461 m)   Wt 58 8 kg (129 lb 10 1 oz)   BMI 27 57 kg/m²          Physical Exam   Constitutional: She is oriented to person, place, and time  She appears well-developed and well-nourished  HENT:   Head: Normocephalic  Eyes: Pupils are equal, round, and reactive to light  Moderate allergic shiners   Cardiovascular: Normal rate, regular rhythm and normal heart sounds  Exam reveals no gallop and no friction rub  No murmur heard  Pulmonary/Chest: Effort normal and breath sounds normal    Abdominal: Soft  Bowel sounds are normal    Musculoskeletal: Normal range of motion  Neurological: She is alert and oriented to person, place, and time  Skin: Skin is warm and dry  Rash noted  5 mm x 3 mm erythematous plaque  This is exactly at her belt buckle  Patient insists on wearing her metal belt blossom even though her mother tries to get her to avoid using these   Psychiatric: She has a normal mood and affect  Her behavior is normal    Appropriate slightly child-like innocent behavior

## 2018-04-05 ENCOUNTER — OFFICE VISIT (OUTPATIENT)
Dept: INTERNAL MEDICINE CLINIC | Facility: CLINIC | Age: 33
End: 2018-04-05
Payer: COMMERCIAL

## 2018-04-05 ENCOUNTER — TELEPHONE (OUTPATIENT)
Dept: INTERNAL MEDICINE CLINIC | Facility: CLINIC | Age: 33
End: 2018-04-05

## 2018-04-05 ENCOUNTER — APPOINTMENT (OUTPATIENT)
Dept: LAB | Facility: CLINIC | Age: 33
End: 2018-04-05
Payer: COMMERCIAL

## 2018-04-05 VITALS
HEIGHT: 58 IN | TEMPERATURE: 97.5 F | HEART RATE: 80 BPM | DIASTOLIC BLOOD PRESSURE: 80 MMHG | BODY MASS INDEX: 27.21 KG/M2 | WEIGHT: 129.63 LBS | SYSTOLIC BLOOD PRESSURE: 110 MMHG

## 2018-04-05 DIAGNOSIS — G43.809 OTHER MIGRAINE, NOT INTRACTABLE, WITHOUT STATUS MIGRAINOSUS: ICD-10-CM

## 2018-04-05 DIAGNOSIS — F81.9 COGNITIVE DEVELOPMENTAL DELAY: ICD-10-CM

## 2018-04-05 DIAGNOSIS — E55.9 VITAMIN D INSUFFICIENCY: ICD-10-CM

## 2018-04-05 DIAGNOSIS — G40.909 EPILEPSY WITHOUT STATUS EPILEPTICUS, NOT INTRACTABLE (HCC): ICD-10-CM

## 2018-04-05 DIAGNOSIS — J30.89 CHRONIC NONSEASONAL ALLERGIC RHINITIS DUE TO POLLEN: Primary | ICD-10-CM

## 2018-04-05 DIAGNOSIS — M83.5 OTHER DRUG-INDUCED OSTEOMALACIA IN ADULTS: ICD-10-CM

## 2018-04-05 DIAGNOSIS — Z23 NEED FOR INFLUENZA VACCINATION: ICD-10-CM

## 2018-04-05 DIAGNOSIS — G40.909 NONINTRACTABLE EPILEPSY WITHOUT STATUS EPILEPTICUS, UNSPECIFIED EPILEPSY TYPE (HCC): ICD-10-CM

## 2018-04-05 DIAGNOSIS — L23.0 ALLERGIC CONTACT DERMATITIS DUE TO METALS: ICD-10-CM

## 2018-04-05 DIAGNOSIS — G43.809 HEADACHE, VARIANT MIGRAINE: ICD-10-CM

## 2018-04-05 DIAGNOSIS — F80.4 SPEECH AND LANGUAGE DEVELOPMENTAL DELAY DUE TO HEARING LOSS: ICD-10-CM

## 2018-04-05 LAB
25(OH)D3 SERPL-MCNC: 39.8 NG/ML (ref 30–100)
ALBUMIN SERPL BCP-MCNC: 3.9 G/DL (ref 3.5–5)
ALP SERPL-CCNC: 113 U/L (ref 46–116)
ALT SERPL W P-5'-P-CCNC: 18 U/L (ref 12–78)
ANION GAP SERPL CALCULATED.3IONS-SCNC: 6 MMOL/L (ref 4–13)
AST SERPL W P-5'-P-CCNC: 16 U/L (ref 5–45)
BILIRUB SERPL-MCNC: 0.11 MG/DL (ref 0.2–1)
BUN SERPL-MCNC: 10 MG/DL (ref 5–25)
CALCIUM SERPL-MCNC: 8.6 MG/DL (ref 8.3–10.1)
CHLORIDE SERPL-SCNC: 107 MMOL/L (ref 100–108)
CO2 SERPL-SCNC: 26 MMOL/L (ref 21–32)
CREAT SERPL-MCNC: 0.56 MG/DL (ref 0.6–1.3)
ERYTHROCYTE [DISTWIDTH] IN BLOOD BY AUTOMATED COUNT: 13.7 % (ref 11.6–15.1)
GFR SERPL CREATININE-BSD FRML MDRD: 125 ML/MIN/1.73SQ M
GLUCOSE P FAST SERPL-MCNC: 68 MG/DL (ref 65–99)
HCT VFR BLD AUTO: 40.6 % (ref 34.8–46.1)
HGB BLD-MCNC: 13 G/DL (ref 11.5–15.4)
MCH RBC QN AUTO: 30 PG (ref 26.8–34.3)
MCHC RBC AUTO-ENTMCNC: 32 G/DL (ref 31.4–37.4)
MCV RBC AUTO: 94 FL (ref 82–98)
PLATELET # BLD AUTO: 325 THOUSANDS/UL (ref 149–390)
PMV BLD AUTO: 9.6 FL (ref 8.9–12.7)
POTASSIUM SERPL-SCNC: 4 MMOL/L (ref 3.5–5.3)
PROT SERPL-MCNC: 7.7 G/DL (ref 6.4–8.2)
RBC # BLD AUTO: 4.33 MILLION/UL (ref 3.81–5.12)
SODIUM SERPL-SCNC: 139 MMOL/L (ref 136–145)
WBC # BLD AUTO: 5.21 THOUSAND/UL (ref 4.31–10.16)

## 2018-04-05 PROCEDURE — 36415 COLL VENOUS BLD VENIPUNCTURE: CPT

## 2018-04-05 PROCEDURE — 85027 COMPLETE CBC AUTOMATED: CPT

## 2018-04-05 PROCEDURE — 80053 COMPREHEN METABOLIC PANEL: CPT

## 2018-04-05 PROCEDURE — 99213 OFFICE O/P EST LOW 20 MIN: CPT | Performed by: PHYSICIAN ASSISTANT

## 2018-04-05 PROCEDURE — 82306 VITAMIN D 25 HYDROXY: CPT

## 2018-04-05 RX ORDER — LORATADINE 10 MG/1
10 TABLET ORAL DAILY
Qty: 90 TABLET | Refills: 1 | Status: SHIPPED | OUTPATIENT
Start: 2018-04-05 | End: 2018-11-15 | Stop reason: SDUPTHER

## 2018-04-05 RX ORDER — FLUTICASONE PROPIONATE 50 MCG
1 SPRAY, SUSPENSION (ML) NASAL 2 TIMES DAILY
Qty: 16 G | Refills: 2 | Status: SHIPPED | OUTPATIENT
Start: 2018-04-05 | End: 2018-07-05 | Stop reason: SDUPTHER

## 2018-04-05 RX ORDER — DIAPER,BRIEF,INFANT-TODD,DISP
EACH MISCELLANEOUS 3 TIMES DAILY
Qty: 30 G | Refills: 0 | Status: SHIPPED | OUTPATIENT
Start: 2018-04-05 | End: 2022-04-12

## 2018-04-05 NOTE — TELEPHONE ENCOUNTER
FORMS HAS BEEN FILLED OUT BY ARIC AKERS PA-C AND HAS BEEN FAXED TO THE NUMBER [phone] AND CONFIRMATION RECEIVED  PAPER PLACED ON SCANNING BIN IN NURSE STATION

## 2018-04-21 DIAGNOSIS — Z00.00 HEALTH CARE MAINTENANCE: Primary | ICD-10-CM

## 2018-04-23 RX ORDER — FOLIC ACID 1 MG/1
TABLET ORAL
Qty: 30 TABLET | Refills: 5 | Status: SHIPPED | OUTPATIENT
Start: 2018-04-23 | End: 2018-10-10 | Stop reason: SDUPTHER

## 2018-05-14 ENCOUNTER — TELEPHONE (OUTPATIENT)
Dept: INTERNAL MEDICINE CLINIC | Facility: CLINIC | Age: 33
End: 2018-05-14

## 2018-05-14 DIAGNOSIS — G40.909 NONINTRACTABLE EPILEPSY WITHOUT STATUS EPILEPTICUS, UNSPECIFIED EPILEPSY TYPE (HCC): Primary | ICD-10-CM

## 2018-05-17 ENCOUNTER — OFFICE VISIT (OUTPATIENT)
Dept: NEUROLOGY | Facility: CLINIC | Age: 33
End: 2018-05-17
Payer: COMMERCIAL

## 2018-05-17 VITALS
WEIGHT: 129.5 LBS | HEART RATE: 74 BPM | SYSTOLIC BLOOD PRESSURE: 121 MMHG | DIASTOLIC BLOOD PRESSURE: 79 MMHG | HEIGHT: 58 IN | RESPIRATION RATE: 14 BRPM | BODY MASS INDEX: 27.18 KG/M2

## 2018-05-17 DIAGNOSIS — M83.5 ANTICONVULSANT DRUG-INDUCED BONE SOFTENING: ICD-10-CM

## 2018-05-17 DIAGNOSIS — G43.809 HEADACHE, VARIANT MIGRAINE: ICD-10-CM

## 2018-05-17 DIAGNOSIS — T42.75XA ANTICONVULSANT DRUG-INDUCED BONE SOFTENING: ICD-10-CM

## 2018-05-17 DIAGNOSIS — G40.909 NONINTRACTABLE EPILEPSY WITHOUT STATUS EPILEPTICUS, UNSPECIFIED EPILEPSY TYPE (HCC): Primary | ICD-10-CM

## 2018-05-17 DIAGNOSIS — F81.9 COGNITIVE DEVELOPMENTAL DELAY: ICD-10-CM

## 2018-05-17 PROBLEM — L23.0 ALLERGIC CONTACT DERMATITIS DUE TO METALS: Status: RESOLVED | Noted: 2018-04-05 | Resolved: 2018-05-17

## 2018-05-17 PROCEDURE — 99214 OFFICE O/P EST MOD 30 MIN: CPT | Performed by: PSYCHIATRY & NEUROLOGY

## 2018-05-17 RX ORDER — CARBAMAZEPINE 300 MG/1
300 CAPSULE, EXTENDED RELEASE ORAL 2 TIMES DAILY
Qty: 60 CAPSULE | Refills: 11 | Status: SHIPPED | OUTPATIENT
Start: 2018-05-17 | End: 2019-06-03 | Stop reason: SDUPTHER

## 2018-05-17 RX ORDER — ACETAMINOPHEN 160 MG
4000 TABLET,DISINTEGRATING ORAL DAILY
Qty: 60 CAPSULE | Refills: 11 | Status: SHIPPED | OUTPATIENT
Start: 2018-05-17 | End: 2019-05-17 | Stop reason: SDUPTHER

## 2018-05-17 RX ORDER — TOPIRAMATE 25 MG/1
50 TABLET ORAL AS NEEDED
Qty: 60 TABLET | Refills: 0 | Status: SHIPPED | OUTPATIENT
Start: 2018-05-17 | End: 2021-06-04 | Stop reason: SDUPTHER

## 2018-05-17 NOTE — PROGRESS NOTES
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 (epileptiform discharges over the left centrotemporal region, but no recurrent seizure for nearly 20 years)  Patient tolerates Carbatrol (Brand name) and remains seizure-free  She will continue with vitamin D supplementation (due to risk of anticonvulsant induced osteopenia) and folic acid while not sexually active she is of childbearing potential   Ms Yoshi Zamorano needs brand name Carbatrol because generic carbamazepine was causing her abdominal cramps  Ms Haas's headache variant has improved with topiramate and increased water consumption  Her headaches are so infrequent that she was able to stop taking topiramate  Her mother has been using topiramate as an abortive medication rather than a preventative medication  She can continue to use topiramate episodically rather than continuously if migraine headaches are still infrequent  In regards to the recurrent episodes of right eye deviation for a few seconds, these are not persistent and I am unable to elicit with activity (not fatigable) and are not associated with other symptoms  I explained to her mother that these are unlikely to be seizures given how short it happens and that there are no other symptoms to suggest seizure activity (altered awareness, staring, dystonic posturing)  I cannot tell her why the eyes drift (but it looks like her daughter is distracted in the pictures)  Plan:   1 - continue with Brand Name Carbatrol 300mg cap, take 1 cap twice a day  2 - continue with Vitamin D 2000 units tab, take 2 tabs daily  3 - may take topiramate 25mg tab give two tabs as needed for migraine headache    4 - if migraine headaches increase in frequency (more than twice a week), give topiramate two tabs every night, and call the office about refills  5 - if eyes are unequal or patient is complaining of persistent double vision (lasting longer than 30 minutes) go to the emergency department for urgent assessment  If double vision comes and goes, or eyes are mis-aligned then call the office for further recommendations  6 - follow-up in 1 year  Problem List Items Addressed This Visit        Cardiovascular and Mediastinum    Headache, variant migraine    Relevant Medications    CARBATROL 300 MG 12 hr capsule    topiramate (TOPAMAX) 25 mg tablet       Nervous and Auditory    Cognitive developmental delay    Epilepsy (HCC) - Primary    Relevant Medications    CARBATROL 300 MG 12 hr capsule    topiramate (TOPAMAX) 25 mg tablet       Musculoskeletal and Integument    Anticonvulsant drug-induced bone softening    Relevant Medications    Cholecalciferol (VITAMIN D3) 2000 units capsule          Chief Complaint:    Chief Complaint   Patient presents with    Seizures     No seizures     Headache     Getting better       HPI:    Brandin Damon is a 33 y o  right handed female here for follow-up evaluation of epilepsy in the setting of intellectual disability  Interval History 5/17/2018  I used the language phone line for Citizen of [place] interpretation  There have been no convulsions or episodes of altered awareness  Her mother reports new symptom of episodic right eye deviation that she is worried about being a seizure  Mother notices some abnormal eye movements when she takes a picture of Dustin or when Deeanna Riedel is talking to her  Dhirajlie's right eye sometimes wanders (or drifts) to the right side, just does it on its own  Her mother notices that the eyes just wanders just a little bit to the side for a few seconds, then it looks normal, this happens every day  There is no double vision  She has seen an eye doctor who said that her vision is okay    The eye doctor told her to see the neurologist because of her history of epilepsy  I asked to look at the pictures, but her mother had deleted the pictures  She showed me one picture that she claims the eye drifts to the right side (like she is always looking to the right) but both eyes were conjugate and looking to the right  Dustin complains of pain in the right eye  She would tell her mother to look at her eye  There is no associated motor movement, arm dystonia, or jerking activity  There is no period of altered awareness or staring spell associated with these eye drifting episodes  When she sleep she may have a rapid arm jerk  The headaches have improved, not as frequent, sometimes weeks go by and she does not complain of headaches  She stopped taking topiramate regularly  She will give the topiramate to abort a headache  She complains of a headache at night, she goes to sleep, and in the morning she is fine  She drinks 2-3 times this time water but sometimes soda  She is still taking vitamin D 4000 units a day  AED/side effects/compliance:  Carbatrol (brand) 300mg twice a day  No side effects  Mother regulates her medication    Seizure semiology:  1  Fatigue then generalized convulsion    Woman of childbearing age with Epilepsy:  Contraception: not sexually active  Folic acid supplement:  No    Prior Epilepsy History:  Intake history 9/25/2014  She is here today with her caretaker and mother is on the phone  The caretaker translate for the mother  Her first seizure occurred when she was 4years old  She was playing with her brother, then she hit her head on a wall, her scalp was lacerated, there was a white thin fabric that came out (unknown substance)  She was observed in a hospital in [facility]   Three months later she had her first convulsion  Her mother described a convulsion, foaming at the mouth, this occurred without warning    After the first convulsion, she would have a convulsion weakly  Afterwards, Katey Gonzalez would express fatigue before she has a generalized convulsion  She was initially tried on Tegretol Syrup, did not work, then Luminal was added  She then moved from [facility] to the [place] and she was transitioned to [employer]  Currently, she may be switched between carbamazepine and Carbatrol  No problem with the generic formulation  She complains of feeling sleepy and tired, maybe the left eye is crossed  Her last reported seizure was in July 1997  Summary of [id number]  During this period there has been no reported seizure  However, we attempted to transition her off of carbamazepine to minimize the risk of osteopenia and other side effects  We started to levetiracetam and then moved on to lamotrigine  With these medications, her mother was concerned about weight gain, excessive sleeping, hair loss, constant itching, moodiness, poor coordination, and painful menses  In 11/2015, she started to complain about headaches and lightheadedness  had a 24 hours ambulatory EEG that was normal (events of headaches and dizziness are not associated with epileptiform activity)  But a subsequent EEG off of lamotrigine; showed left centrotemporal spike-slow wave discharges  We then transitioned her back to carbamazepine; but, on generic formulation, there was abdominal cramps  She was prescribed brand name Carbatrol at 300mg twice a day which resulted in a 360 degree change in her behavior  However, her mother started to worry about frontal headaches  Summary of 2017  Transitioned to Carbatrol brand name 300mg twice a day  There has been a 360 degree change in Mireilies behavior  She is no longer having a rash, her hair is not falling out, no complaints of abdominal pain, menses is more regular, and she is no longer feeling depressed  Her mother feels that she is back to her usual self  She is taking vitamin D supplements      May 2017, there was an increase in headache frequency (from once a week to nearly every day)  Renay Taylor has a difficult time describing her headaches; she would typically call out to her mother [facility]) and report a headache, these can be severe, frontal headaches, sometimes associated with dizziness, nausea, and not wanting to eat  Headaches can be electrical or pulsing in quality  Mostly occurring at night; more frequently as her sinuses gets worse from her allergies (If she does not have sinusitis then the headache is once a week)  Her PCP has started her on loratadine and Flonase  She would get two Tylenol tabs then she goes to sleep  Dustin may also complain of feeling vertiginous associated with or independent of the headaches  She does not drink water at all because she does not like it  Dustin prefers juice and sodas (mostly Pepsi, Coca-Cola, and little juice boxes) but not every day  By 2017, she started topiramate and increased water consumption, which help decrease the headache frequency  Special Features  Status epilepticus: No  Self Injury Seizures: No  Precipitating Factors: None    Epilepsy Risk Factors:  Abnormal pregnancy: No  Abnormal birth/: Full term but small for gestational age (4 5lbs)  Abnormal Development: Walking at [age] years    Speech did not develop until [age] years, attention deficit, she needed speech therapy  Febrile seizures, simple: No  Febrile seizures, complex: No  CNS infection: No  Mental retardation: Learning disability  Cerebral palsy: No  Head injury (moderate/severe): Yes  CNS neoplasm: No  CNS malformation: No  Neurosurgical procedure: No  Stroke: No  Alcohol abuse: No  Drug abuse: No  Family history Sz/epilepsy: No    Prior AEDs:  Luminal  Tegretol Syrup  Carbamazepine/Carbatrol (transitioned off to minimize the risk of osteoporosis)  Levetiracetam (weight gain, irritability)  Lamotrigine (depression, irritability)    Prior workup:  x   Imaging:  MRI brain 3/24/2008  [id number] T2 hyperintense focus within the subcortical white matter of the lateral aspect of the left midfrontal lobe (may be seen in migraine headaches)    5/25/2017 - MRI brain  No intracranial pathology  Mild left anterior ethmoid and maxillary sinus disease    EEGs:  3/21/2008 Dr Sanford Ksenia  6-7 Hz PDR  Bifrontal sharp and spike activity  Generalized spike-and-wave activity with frontal predominance    11/4-5/2015 24 hours ambulatory EEG  Normal 24 hours ambulatory EEG  Patient event button of dizziness (twice) shows no change to the awake background EEG  07/08/2016 >1 hour EEG  Rare presence of focal spike-slow waves over the left central-temporal region      Labs:  Component      Latest Ref Rng & Units 5/4/2017 9/21/2017 4/5/2018   Sodium      136 - 145 mmol/L   139   Potassium      3 5 - 5 3 mmol/L   4 0   Chloride      100 - 108 mmol/L   107   CO2      21 - 32 mmol/L   26   Anion Gap      4 - 13 mmol/L   6   BUN      5 - 25 mg/dL   10   Creatinine      0 60 - 1 30 mg/dL   0 56 (L)   GLUCOSE FASTING      65 - 99 mg/dL   68   Calcium      8 3 - 10 1 mg/dL   8 6   AST      5 - 45 U/L   16   ALT      12 - 78 U/L   18   Alkaline Phosphatase      46 - 116 U/L   113   Total Protein      6 4 - 8 2 g/dL   7 7   Albumin      3 5 - 5 0 g/dL   3 9   Total Bilirubin      0 20 - 1 00 mg/dL   0 11 (L)   eGFR      ml/min/1 73sq m   125   WBC      4 31 - 10 16 Thousand/uL   5 21   RBC      3 81 - 5 12 Million/uL   4 33   Hemoglobin      11 5 - 15 4 g/dL   13 0   Hematocrit      34 8 - 46 1 %   40 6   Platelets      686 - 390 Thousands/uL   325   CARBAMAZEPINE LEVEL      4 0 - 12 0 ug/mL 4 2 8 2    Vit D, 25-Hydroxy      30 0 - 100 0 ng/mL   39 8       General exam   Blood pressure 121/79, pulse 74, resp  rate 14, height 4' 9 5" (1 461 m), weight 58 7 kg (129 lb 8 oz)    Appearance: normally developed, appears well  Carotids: n/a  Cardiovascular: regular rate and rhythm and normal heart sounds  Pulmonary: clear to auscultation    HEENT: there is no ptosis, no dysconjugate gaze, unable to elicit right eye movement, anicteric and moist mucus membranes / oral cavity   Fundoscopy: not assessed    Mental status  Orientation: alert and oriented to name  Fund of Knowledge: limited   Attention and Concentration: not assessed  Current and Remote Memory:not assessed  Language: no aphasia, spontaneous speech is normal, comprehension is intact and naming is intact    Cranial Nerves  CN 1: not tested  CN 2: Visual fields intact to threat and pupils equal round reactive to direct and consenual light   CN 3, 4, 6: EOMI, no nystagmus  CN 5:sensation intact to all distriubtion V1, V2, V3  CN 7:muscles of facial expression are symmetric  CN 8:not assessed  CN 9, 10:no dysarthria present  CN 11:symmetric strength of sternocleidomastoid and trapezius muscles  CN 12:tongue is midline    Motor:  Bulk, Tone: normal bulk, normal tone  Pronation: no pronator drift  Strength: Symmetric strength of the arms and legs, no lateralizing weakness  Abnormal movements: no abnormal movements are present    Sensory:  Lighttouch: intact in all limbs  Romberg:normal    Coordination:  FNF:FNF bilaterally intact  Gait/Station:normal gait    Reflexes:  reflexes are uniformly hyporeflexic    Past Medical/Surgical History:  Patient Active Problem List   Diagnosis    Allergic rhinitis    Anticonvulsant drug-induced bone softening    Cognitive developmental delay    Epilepsy ([facility] [address] )    Headache, variant migraine    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    Vitamin D insufficiency     History reviewed  No pertinent past medical history    Past Surgical History:   Procedure Laterality Date    NO PAST SURGERIES         Past Psychiatric History:  Depression: No  Anxiety: No  Psychosis: No    Medications:    Current Outpatient Prescriptions:     CARBATROL 300 MG 12 hr capsule, Take 1 capsule (300 mg total) by mouth 2 (two) times a day, Disp: 60 capsule, Rfl: 11   Cholecalciferol (VITAMIN D3) 2000 units capsule, Take 2 capsules (4,000 Units total) by mouth daily, Disp: 60 capsule, Rfl: 11    fluticasone (FLONASE) 50 mcg/act nasal spray, 1 spray into each nostril 2 (two) times a day for 180 days, Disp: 16 g, Rfl: 2    folic acid (FOLVITE) 1 mg tablet, take 1 tablet by mouth once daily, Disp: 30 tablet, Rfl: 5    hydrocortisone 1 % cream, Apply topically 3 (three) times a day for 7 days, Disp: 30 g, Rfl: 0    loratadine (CLARITIN) 10 mg tablet, Take 1 tablet (10 mg total) by mouth daily for 180 days, Disp: 90 tablet, Rfl: 1    topiramate (TOPAMAX) 25 mg tablet, Take 2 tablets (50 mg total) by mouth as needed (migraine headache), Disp: 60 tablet, Rfl: 0    Allergies: Allergies   Allergen Reactions    Dust Mite Extract     Nickel     Penicillins     Pollen Extract     Short Ragweed Pollen Ext        Family history:  Family History   Problem Relation Age of Onset   Lindy Shaw Migraines Mother     No Known Problems Father      There is no family history of seizure, epilepsy or developmental delay  Social History  Living situation:  Lives with mother  Work:  No  Driving:  No  Social History   Substance Use Topics    Smoking status: Never Smoker    Smokeless tobacco: Never Used    Alcohol use No        Review of Systems  A review of at least 12 organ/systems was obtained by the medical assistant and reviewed by me, including additional positives/negatives:  Neurological: Positive for dizziness and headaches  Hematological: Negative  Psychiatric/Behavioral: Negative  Decision making was of high-complexity due to the patient's high risk condition (seizures), psychiatric and neuropsychological comorbidities, behavioral problems, memory and cognitive problems and medication side effects  The total amount of time spent with the patient was 36 minutes  More than 50% of this time was devoted to counseling and coordination of care   Issues addressed during this clinic visit included overall management, medication counseling or monitoring (including adverse effects, side effects and risks of antiepileptic medications)     Start time: 10:30AM  End time: 11:06AM

## 2018-05-17 NOTE — PATIENT INSTRUCTIONS
PLAN:  1 - continue with Brand Name Carbatrol 300mg cap, take 1 cap twice a day  2 - continue with Vitamin D 2000 units tab, take 2 tabs daily  3 - may take topiramate 25mg tab give two tabs as needed for migraine headache  4 - if migraine headaches increase in frequency (more than twice a week), give topiramate two tabs every night, and call the office about refills  5 - if eyes are unequal or patient is complaining of persistent double vision (lasting longer than 30 minutes) go to the emergency department for urgent assessment  If double vision comes and goes, or eyes are mis-aligned then call the office for further recommendations  6 - follow-up in 1 year

## 2018-05-17 NOTE — PROGRESS NOTES
Patient ID: Blane Sanchez is a 33 y o  female  Assessment/Plan:    No problem-specific Assessment & Plan notes found for this encounter  {Assess/PlanSmartLinks:03037}       Subjective:    HPI    {[facility] HPI texts:34768}    {Common ambulatory SmartLinks:22895}         Objective: There were no vitals taken for this visit  Physical Exam    Neurological Exam      ROS:    Review of Systems   Constitutional: Negative  HENT: Negative  Eyes: Negative  Respiratory: Negative  Cardiovascular: Negative  Gastrointestinal: Negative  Endocrine: Negative  Genitourinary: Negative  Musculoskeletal: Negative  Skin: Negative  Allergic/Immunologic: Negative  Neurological: Positive for dizziness and headaches  Hematological: Negative  Psychiatric/Behavioral: Negative

## 2018-07-05 DIAGNOSIS — J30.89 CHRONIC NONSEASONAL ALLERGIC RHINITIS DUE TO POLLEN: ICD-10-CM

## 2018-07-05 RX ORDER — FLUTICASONE PROPIONATE 50 MCG
SPRAY, SUSPENSION (ML) NASAL
Qty: 16 G | Refills: 2 | Status: SHIPPED | OUTPATIENT
Start: 2018-07-05 | End: 2018-10-10 | Stop reason: SDUPTHER

## 2018-10-10 DIAGNOSIS — J30.89 CHRONIC NONSEASONAL ALLERGIC RHINITIS DUE TO POLLEN: ICD-10-CM

## 2018-10-10 DIAGNOSIS — Z00.00 HEALTH CARE MAINTENANCE: ICD-10-CM

## 2018-10-10 RX ORDER — FOLIC ACID 1 MG/1
TABLET ORAL
Qty: 30 TABLET | Refills: 5 | Status: SHIPPED | OUTPATIENT
Start: 2018-10-10 | End: 2019-04-12 | Stop reason: SDUPTHER

## 2018-10-10 RX ORDER — FLUTICASONE PROPIONATE 50 MCG
SPRAY, SUSPENSION (ML) NASAL
Qty: 16 G | Refills: 2 | Status: SHIPPED | OUTPATIENT
Start: 2018-10-10 | End: 2019-01-07 | Stop reason: SDUPTHER

## 2018-11-15 DIAGNOSIS — J30.89 CHRONIC NONSEASONAL ALLERGIC RHINITIS DUE TO POLLEN: ICD-10-CM

## 2018-11-15 RX ORDER — LORATADINE 10 MG/1
10 TABLET ORAL DAILY
Qty: 90 TABLET | Refills: 0 | Status: SHIPPED | OUTPATIENT
Start: 2018-11-15 | End: 2019-01-16 | Stop reason: SDUPTHER

## 2018-12-18 ENCOUNTER — TELEPHONE (OUTPATIENT)
Dept: INTERNAL MEDICINE CLINIC | Facility: CLINIC | Age: 33
End: 2018-12-18

## 2019-01-07 DIAGNOSIS — J30.89 CHRONIC NONSEASONAL ALLERGIC RHINITIS DUE TO POLLEN: ICD-10-CM

## 2019-01-07 RX ORDER — FLUTICASONE PROPIONATE 50 MCG
1 SPRAY, SUSPENSION (ML) NASAL 2 TIMES DAILY
Qty: 16 G | Refills: 2 | Status: SHIPPED | OUTPATIENT
Start: 2019-01-07 | End: 2019-04-15 | Stop reason: SDUPTHER

## 2019-01-16 ENCOUNTER — OFFICE VISIT (OUTPATIENT)
Dept: INTERNAL MEDICINE CLINIC | Facility: CLINIC | Age: 34
End: 2019-01-16

## 2019-01-16 VITALS
SYSTOLIC BLOOD PRESSURE: 100 MMHG | HEIGHT: 58 IN | TEMPERATURE: 98.1 F | HEART RATE: 72 BPM | DIASTOLIC BLOOD PRESSURE: 80 MMHG | BODY MASS INDEX: 27.86 KG/M2 | WEIGHT: 132.72 LBS

## 2019-01-16 DIAGNOSIS — Z87.898 HISTORY OF WEIGHT GAIN: ICD-10-CM

## 2019-01-16 DIAGNOSIS — J30.89 CHRONIC NONSEASONAL ALLERGIC RHINITIS DUE TO POLLEN: ICD-10-CM

## 2019-01-16 DIAGNOSIS — N64.4 BREAST PAIN: Primary | ICD-10-CM

## 2019-01-16 PROCEDURE — 99213 OFFICE O/P EST LOW 20 MIN: CPT | Performed by: PHYSICIAN ASSISTANT

## 2019-01-16 RX ORDER — LORATADINE 10 MG/1
10 TABLET ORAL DAILY
Qty: 90 TABLET | Refills: 1 | Status: SHIPPED | OUTPATIENT
Start: 2019-01-16 | End: 2019-06-06 | Stop reason: SDUPTHER

## 2019-01-16 NOTE — PATIENT INSTRUCTIONS
Discussed with patient and mother no abnormalities found on exam   Pain is likely secondary to premenstrual   As pain is not significantly bothersome no medication needed  Patient does usually wear underwire bras and advised during this time a month may be more comfortable to switch to a non wire bra during this time  Please get the blood test completed and we will advise if any abnormalities

## 2019-01-16 NOTE — PROGRESS NOTES
Assessment/Plan:    No problem-specific Assessment & Plan notes found for this encounter  Diagnoses and all orders for this visit:    Breast pain    History of weight gain  -     TSH, 3rd generation with Free T4 reflex    Chronic nonseasonal allergic rhinitis due to pollen  -     loratadine (CLARITIN) 10 mg tablet; Take 1 tablet (10 mg total) by mouth daily for 180 days          Subjective:      Patient ID: Suzanne Zazueta is a 29 y o  female  Pt here for [facility] accompanied by mother with c/o bilateral breast pain x 3 days  States pain and some itching  Patient does have cognitive developmental delay however she is able to participate in her care and can provide some historical information  Patient and mother are reporting as noted above the breast pain is not severe and it is bilateral   As noted patient is due to start her menstruation within the next few days  Both patient and mother confirm that she does get breast pain prior to menstruation monthly but that this month it was more painful than previously  LMP was middle of December and due now has had the pain before stronger this time  States menstruation is regular about 7 days and not having clots  Patient and mother deny weight loss  In fact mother is reporting significant weight gain but feels she is eating the same quantity of food  She has not noticed any significant changes in physical activity  Confirm no seizures continues to follow with neurology          The following portions of the patient's history were reviewed and updated as appropriate: allergies, current medications, past family history, past medical history, past social history, past surgical history and problem list     Review of Systems   Constitutional: Positive for unexpected weight change (gain, record review shows a 3 lb weight gain between April of 2018 and now)  Negative for chills and fatigue  HENT: Negative  Respiratory: Negative    Negative for cough and shortness of breath  Cardiovascular: Negative for chest pain and leg swelling  Gastrointestinal: Negative for abdominal pain  Genitourinary: Negative for menstrual problem  As noted in HPI patient reporting bilateral breast pain x3 days   Skin: Negative for rash  Objective:      /80 (BP Location: Left arm, Patient Position: Sitting, Cuff Size: Standard)   Pulse 72   Temp 98 1 °F (36 7 °C) (Oral)   Ht 4' 9 5" (1 461 m)   Wt 60 2 kg (132 lb 11 5 oz)   BMI 28 22 kg/m²          Physical Exam   Constitutional: She appears well-developed and well-nourished  MA chaperone in room for entire visit  HENT:   Head: Normocephalic  Cardiovascular: Normal rate, regular rhythm and normal heart sounds  No murmur heard  Pulmonary/Chest: Effort normal and breath sounds normal  She has no wheezes  Right breast exhibits no inverted nipple, no mass, no nipple discharge, no skin change and no tenderness  Left breast exhibits no inverted nipple, no mass, no nipple discharge, no skin change and no tenderness  Breasts are symmetrical        No masses noted  No skin changes  No axillary adenopathy noted  No masses noted in tail of Simon bilaterally   Musculoskeletal: She exhibits no edema  Lymphadenopathy:     She has no cervical adenopathy  Skin: No rash noted     Psychiatric: Her behavior is normal

## 2019-02-04 ENCOUNTER — APPOINTMENT (OUTPATIENT)
Dept: LAB | Facility: CLINIC | Age: 34
End: 2019-02-04
Payer: COMMERCIAL

## 2019-02-04 LAB — TSH SERPL DL<=0.05 MIU/L-ACNC: 2.04 UIU/ML (ref 0.36–3.74)

## 2019-02-04 PROCEDURE — 36415 COLL VENOUS BLD VENIPUNCTURE: CPT | Performed by: PHYSICIAN ASSISTANT

## 2019-02-04 PROCEDURE — 84443 ASSAY THYROID STIM HORMONE: CPT | Performed by: PHYSICIAN ASSISTANT

## 2019-04-12 DIAGNOSIS — Z00.00 HEALTH CARE MAINTENANCE: ICD-10-CM

## 2019-04-12 RX ORDER — FOLIC ACID 1 MG/1
TABLET ORAL
Qty: 30 TABLET | Refills: 5 | Status: SHIPPED | OUTPATIENT
Start: 2019-04-12 | End: 2019-06-03 | Stop reason: SDUPTHER

## 2019-04-15 DIAGNOSIS — J30.89 CHRONIC NONSEASONAL ALLERGIC RHINITIS DUE TO POLLEN: ICD-10-CM

## 2019-04-15 RX ORDER — FLUTICASONE PROPIONATE 50 MCG
SPRAY, SUSPENSION (ML) NASAL
Qty: 16 G | Refills: 2 | Status: SHIPPED | OUTPATIENT
Start: 2019-04-15 | End: 2019-06-06 | Stop reason: SDUPTHER

## 2019-04-19 ENCOUNTER — TELEPHONE (OUTPATIENT)
Dept: NEUROLOGY | Facility: CLINIC | Age: 34
End: 2019-04-19

## 2019-04-19 DIAGNOSIS — G40.909 NONINTRACTABLE EPILEPSY WITHOUT STATUS EPILEPTICUS, UNSPECIFIED EPILEPSY TYPE (HCC): Primary | ICD-10-CM

## 2019-05-17 ENCOUNTER — APPOINTMENT (OUTPATIENT)
Dept: LAB | Facility: CLINIC | Age: 34
End: 2019-05-17
Payer: COMMERCIAL

## 2019-05-17 DIAGNOSIS — M83.5 ANTICONVULSANT DRUG-INDUCED BONE SOFTENING: ICD-10-CM

## 2019-05-17 DIAGNOSIS — G40.909 NONINTRACTABLE EPILEPSY WITHOUT STATUS EPILEPTICUS, UNSPECIFIED EPILEPSY TYPE (HCC): ICD-10-CM

## 2019-05-17 DIAGNOSIS — T42.75XA ANTICONVULSANT DRUG-INDUCED BONE SOFTENING: ICD-10-CM

## 2019-05-17 LAB
25(OH)D3 SERPL-MCNC: 27.9 NG/ML (ref 30–100)
ALBUMIN SERPL BCP-MCNC: 3.9 G/DL (ref 3.5–5)
ALP SERPL-CCNC: 125 U/L (ref 46–116)
ALT SERPL W P-5'-P-CCNC: 19 U/L (ref 12–78)
ANION GAP SERPL CALCULATED.3IONS-SCNC: 4 MMOL/L (ref 4–13)
AST SERPL W P-5'-P-CCNC: 16 U/L (ref 5–45)
BILIRUB SERPL-MCNC: 0.3 MG/DL (ref 0.2–1)
BUN SERPL-MCNC: 6 MG/DL (ref 5–25)
CALCIUM SERPL-MCNC: 8.7 MG/DL (ref 8.3–10.1)
CARBAMAZEPINE SERPL-MCNC: 4.6 UG/ML (ref 4–12)
CHLORIDE SERPL-SCNC: 103 MMOL/L (ref 100–108)
CO2 SERPL-SCNC: 28 MMOL/L (ref 21–32)
CREAT SERPL-MCNC: 0.59 MG/DL (ref 0.6–1.3)
ERYTHROCYTE [DISTWIDTH] IN BLOOD BY AUTOMATED COUNT: 12.9 % (ref 11.6–15.1)
GFR SERPL CREATININE-BSD FRML MDRD: 122 ML/MIN/1.73SQ M
GLUCOSE P FAST SERPL-MCNC: 81 MG/DL (ref 65–99)
HCT VFR BLD AUTO: 40.6 % (ref 34.8–46.1)
HGB BLD-MCNC: 13.2 G/DL (ref 11.5–15.4)
MCH RBC QN AUTO: 30.4 PG (ref 26.8–34.3)
MCHC RBC AUTO-ENTMCNC: 32.5 G/DL (ref 31.4–37.4)
MCV RBC AUTO: 94 FL (ref 82–98)
PLATELET # BLD AUTO: 308 THOUSANDS/UL (ref 149–390)
PMV BLD AUTO: 9.6 FL (ref 8.9–12.7)
POTASSIUM SERPL-SCNC: 3.6 MMOL/L (ref 3.5–5.3)
PROT SERPL-MCNC: 7.9 G/DL (ref 6.4–8.2)
RBC # BLD AUTO: 4.34 MILLION/UL (ref 3.81–5.12)
SODIUM SERPL-SCNC: 135 MMOL/L (ref 136–145)
WBC # BLD AUTO: 7.2 THOUSAND/UL (ref 4.31–10.16)

## 2019-05-17 PROCEDURE — 80156 ASSAY CARBAMAZEPINE TOTAL: CPT

## 2019-05-17 PROCEDURE — 82306 VITAMIN D 25 HYDROXY: CPT

## 2019-05-17 PROCEDURE — 85027 COMPLETE CBC AUTOMATED: CPT

## 2019-05-17 PROCEDURE — 36415 COLL VENOUS BLD VENIPUNCTURE: CPT

## 2019-05-17 PROCEDURE — 80053 COMPREHEN METABOLIC PANEL: CPT

## 2019-05-17 RX ORDER — GLUCOSAMINE/CHONDR SU A SOD 750-600 MG
TABLET ORAL
Qty: 60 CAPSULE | Refills: 0 | Status: SHIPPED | OUTPATIENT
Start: 2019-05-17 | End: 2019-06-03 | Stop reason: SDUPTHER

## 2019-05-24 ENCOUNTER — TELEPHONE (OUTPATIENT)
Dept: INTERNAL MEDICINE CLINIC | Facility: CLINIC | Age: 34
End: 2019-05-24

## 2019-05-24 DIAGNOSIS — G40.909 NONINTRACTABLE EPILEPSY WITHOUT STATUS EPILEPTICUS, UNSPECIFIED EPILEPSY TYPE (HCC): Primary | ICD-10-CM

## 2019-06-03 ENCOUNTER — OFFICE VISIT (OUTPATIENT)
Dept: NEUROLOGY | Facility: CLINIC | Age: 34
End: 2019-06-03
Payer: COMMERCIAL

## 2019-06-03 VITALS
WEIGHT: 136.6 LBS | HEART RATE: 75 BPM | SYSTOLIC BLOOD PRESSURE: 115 MMHG | BODY MASS INDEX: 29.05 KG/M2 | DIASTOLIC BLOOD PRESSURE: 56 MMHG

## 2019-06-03 DIAGNOSIS — E55.9 VITAMIN D INSUFFICIENCY: Primary | ICD-10-CM

## 2019-06-03 DIAGNOSIS — G40.909 NONINTRACTABLE EPILEPSY WITHOUT STATUS EPILEPTICUS, UNSPECIFIED EPILEPSY TYPE (HCC): ICD-10-CM

## 2019-06-03 DIAGNOSIS — G43.809 HEADACHE, VARIANT MIGRAINE: ICD-10-CM

## 2019-06-03 DIAGNOSIS — M83.5 ANTICONVULSANT DRUG-INDUCED BONE SOFTENING: ICD-10-CM

## 2019-06-03 DIAGNOSIS — Z00.00 HEALTH CARE MAINTENANCE: ICD-10-CM

## 2019-06-03 DIAGNOSIS — T42.75XA ANTICONVULSANT DRUG-INDUCED BONE SOFTENING: ICD-10-CM

## 2019-06-03 PROCEDURE — 99214 OFFICE O/P EST MOD 30 MIN: CPT | Performed by: PSYCHIATRY & NEUROLOGY

## 2019-06-03 RX ORDER — CARBAMAZEPINE 300 MG/1
300 CAPSULE, EXTENDED RELEASE ORAL 2 TIMES DAILY
Qty: 60 CAPSULE | Refills: 11 | Status: SHIPPED | OUTPATIENT
Start: 2019-06-03 | End: 2020-05-18

## 2019-06-03 RX ORDER — FOLIC ACID 1 MG/1
1000 TABLET ORAL DAILY
Qty: 30 TABLET | Refills: 11 | Status: SHIPPED | OUTPATIENT
Start: 2019-06-03 | End: 2020-06-13 | Stop reason: SDUPTHER

## 2019-06-06 ENCOUNTER — TELEPHONE (OUTPATIENT)
Dept: INTERNAL MEDICINE CLINIC | Facility: CLINIC | Age: 34
End: 2019-06-06

## 2019-06-06 ENCOUNTER — OFFICE VISIT (OUTPATIENT)
Dept: INTERNAL MEDICINE CLINIC | Facility: CLINIC | Age: 34
End: 2019-06-06

## 2019-06-06 VITALS
HEIGHT: 58 IN | HEART RATE: 72 BPM | TEMPERATURE: 98.2 F | BODY MASS INDEX: 28.69 KG/M2 | DIASTOLIC BLOOD PRESSURE: 74 MMHG | WEIGHT: 136.69 LBS | SYSTOLIC BLOOD PRESSURE: 110 MMHG

## 2019-06-06 DIAGNOSIS — F80.4 SPEECH AND LANGUAGE DEVELOPMENTAL DELAY DUE TO HEARING LOSS: ICD-10-CM

## 2019-06-06 DIAGNOSIS — G40.009 PARTIAL IDIOPATHIC EPILEPSY WITH SEIZURES OF LOCALIZED ONSET, NOT INTRACTABLE, WITHOUT STATUS EPILEPTICUS (HCC): ICD-10-CM

## 2019-06-06 DIAGNOSIS — E66.3 OVERWEIGHT (BMI 25.0-29.9): Chronic | ICD-10-CM

## 2019-06-06 DIAGNOSIS — J30.89 CHRONIC NONSEASONAL ALLERGIC RHINITIS DUE TO POLLEN: ICD-10-CM

## 2019-06-06 DIAGNOSIS — E55.9 VITAMIN D INSUFFICIENCY: ICD-10-CM

## 2019-06-06 DIAGNOSIS — J30.2 SEASONAL ALLERGIC RHINITIS, UNSPECIFIED TRIGGER: ICD-10-CM

## 2019-06-06 DIAGNOSIS — G43.809 HEADACHE, VARIANT MIGRAINE: ICD-10-CM

## 2019-06-06 DIAGNOSIS — F81.9 COGNITIVE DEVELOPMENTAL DELAY: Primary | ICD-10-CM

## 2019-06-06 DIAGNOSIS — Z11.1 TUBERCULOSIS SCREENING: Chronic | ICD-10-CM

## 2019-06-06 PROBLEM — N64.4 BREAST PAIN: Status: RESOLVED | Noted: 2019-01-16 | Resolved: 2019-06-06

## 2019-06-06 PROBLEM — Z87.898 HISTORY OF WEIGHT GAIN: Status: RESOLVED | Noted: 2019-01-16 | Resolved: 2019-06-06

## 2019-06-06 PROCEDURE — 1036F TOBACCO NON-USER: CPT | Performed by: PHYSICIAN ASSISTANT

## 2019-06-06 PROCEDURE — 3008F BODY MASS INDEX DOCD: CPT | Performed by: PHYSICIAN ASSISTANT

## 2019-06-06 PROCEDURE — 3725F SCREEN DEPRESSION PERFORMED: CPT | Performed by: PHYSICIAN ASSISTANT

## 2019-06-06 PROCEDURE — 99213 OFFICE O/P EST LOW 20 MIN: CPT | Performed by: PHYSICIAN ASSISTANT

## 2019-06-06 RX ORDER — FLUTICASONE PROPIONATE 50 MCG
1 SPRAY, SUSPENSION (ML) NASAL DAILY
Qty: 16 G | Refills: 1 | Status: SHIPPED | OUTPATIENT
Start: 2019-06-06 | End: 2019-10-07 | Stop reason: SDUPTHER

## 2019-06-06 RX ORDER — LORATADINE 10 MG/1
10 TABLET ORAL DAILY
Qty: 90 TABLET | Refills: 1 | Status: SHIPPED | OUTPATIENT
Start: 2019-06-06 | End: 2020-04-01 | Stop reason: SDUPTHER

## 2019-06-07 ENCOUNTER — APPOINTMENT (OUTPATIENT)
Dept: LAB | Facility: HOSPITAL | Age: 34
End: 2019-06-07
Payer: COMMERCIAL

## 2019-06-07 DIAGNOSIS — Z11.1 TUBERCULOSIS SCREENING: Chronic | ICD-10-CM

## 2019-06-07 PROCEDURE — 86480 TB TEST CELL IMMUN MEASURE: CPT

## 2019-06-07 PROCEDURE — 36415 COLL VENOUS BLD VENIPUNCTURE: CPT

## 2019-06-11 LAB
GAMMA INTERFERON BACKGROUND BLD IA-ACNC: 0.02 IU/ML
M TB IFN-G BLD-IMP: NEGATIVE
M TB IFN-G CD4+ BCKGRND COR BLD-ACNC: -0.01 IU/ML
M TB IFN-G CD4+ BCKGRND COR BLD-ACNC: 0.01 IU/ML
MITOGEN IGNF BCKGRD COR BLD-ACNC: >10 IU/ML

## 2019-06-17 ENCOUNTER — TELEPHONE (OUTPATIENT)
Dept: NEUROLOGY | Facility: CLINIC | Age: 34
End: 2019-06-17

## 2019-10-07 DIAGNOSIS — J30.89 CHRONIC NONSEASONAL ALLERGIC RHINITIS DUE TO POLLEN: ICD-10-CM

## 2019-10-07 RX ORDER — FLUTICASONE PROPIONATE 50 MCG
SPRAY, SUSPENSION (ML) NASAL
Qty: 16 G | Refills: 1 | Status: SHIPPED | OUTPATIENT
Start: 2019-10-07 | End: 2020-01-31 | Stop reason: SDUPTHER

## 2020-01-31 DIAGNOSIS — J30.89 CHRONIC NONSEASONAL ALLERGIC RHINITIS DUE TO POLLEN: ICD-10-CM

## 2020-01-31 RX ORDER — FLUTICASONE PROPIONATE 50 MCG
2 SPRAY, SUSPENSION (ML) NASAL DAILY
Qty: 16 G | Refills: 1 | Status: SHIPPED | OUTPATIENT
Start: 2020-01-31 | End: 2020-03-09 | Stop reason: SDUPTHER

## 2020-02-07 ENCOUNTER — TELEPHONE (OUTPATIENT)
Dept: NEUROLOGY | Facility: CLINIC | Age: 35
End: 2020-02-07

## 2020-02-07 NOTE — TELEPHONE ENCOUNTER
Called patient as she is on wait list for 1 year FU with Dr Chavo Bean ls 6/2019 and 6/2020 schedule was not out at that time  Tried to schedule patient but she only speaks Katina Cárdenas could you call to coordinate at follow up this her? Thanks!

## 2020-03-09 DIAGNOSIS — J30.89 CHRONIC NONSEASONAL ALLERGIC RHINITIS DUE TO POLLEN: ICD-10-CM

## 2020-03-09 RX ORDER — FLUTICASONE PROPIONATE 50 MCG
2 SPRAY, SUSPENSION (ML) NASAL DAILY
Qty: 16 G | Refills: 1 | Status: SHIPPED | OUTPATIENT
Start: 2020-03-09 | End: 2020-05-13 | Stop reason: SDUPTHER

## 2020-03-09 NOTE — TELEPHONE ENCOUNTER
Name of medication, dose, quantity and frequency:    Requested Prescriptions     Pending Prescriptions Disp Refills    fluticasone (FLONASE) 50 mcg/act nasal spray 16 g 1     Si sprays into each nostril daily       Number of refills left: 0    Amount of medication left:    Pharmacy verified and updated:  yes    Additional information:    Received fax from pharmacy requesting refills

## 2020-04-01 ENCOUNTER — TELEMEDICINE (OUTPATIENT)
Dept: INTERNAL MEDICINE CLINIC | Facility: CLINIC | Age: 35
End: 2020-04-01

## 2020-04-01 DIAGNOSIS — J30.89 CHRONIC NONSEASONAL ALLERGIC RHINITIS DUE TO POLLEN: ICD-10-CM

## 2020-04-01 DIAGNOSIS — N39.0 URINARY TRACT INFECTION WITHOUT HEMATURIA, SITE UNSPECIFIED: Primary | ICD-10-CM

## 2020-04-01 PROCEDURE — 99213 OFFICE O/P EST LOW 20 MIN: CPT | Performed by: PHYSICIAN ASSISTANT

## 2020-04-01 PROCEDURE — T1015 CLINIC SERVICE: HCPCS | Performed by: PHYSICIAN ASSISTANT

## 2020-04-01 PROCEDURE — 1036F TOBACCO NON-USER: CPT | Performed by: PHYSICIAN ASSISTANT

## 2020-04-01 RX ORDER — LORATADINE 10 MG/1
10 TABLET ORAL DAILY
Qty: 90 TABLET | Refills: 1 | Status: SHIPPED | OUTPATIENT
Start: 2020-04-01 | End: 2021-01-20

## 2020-04-01 RX ORDER — SULFAMETHOXAZOLE AND TRIMETHOPRIM 800; 160 MG/1; MG/1
1 TABLET ORAL 2 TIMES DAILY
Qty: 6 TABLET | Refills: 0 | Status: SHIPPED | OUTPATIENT
Start: 2020-04-01 | End: 2020-04-04

## 2020-05-05 DIAGNOSIS — M83.5 ANTICONVULSANT DRUG-INDUCED BONE SOFTENING: ICD-10-CM

## 2020-05-05 DIAGNOSIS — E55.9 VITAMIN D INSUFFICIENCY: ICD-10-CM

## 2020-05-05 DIAGNOSIS — G40.009 PARTIAL IDIOPATHIC EPILEPSY WITH SEIZURES OF LOCALIZED ONSET, NOT INTRACTABLE, WITHOUT STATUS EPILEPTICUS (HCC): Primary | ICD-10-CM

## 2020-05-05 DIAGNOSIS — T42.75XA ANTICONVULSANT DRUG-INDUCED BONE SOFTENING: ICD-10-CM

## 2020-05-07 RX ORDER — GLUCOSAMINE/CHONDR SU A SOD 750-600 MG
TABLET ORAL
Qty: 60 CAPSULE | Refills: 1 | Status: SHIPPED | OUTPATIENT
Start: 2020-05-07 | End: 2020-06-13 | Stop reason: SDUPTHER

## 2020-05-13 DIAGNOSIS — J30.89 CHRONIC NONSEASONAL ALLERGIC RHINITIS DUE TO POLLEN: ICD-10-CM

## 2020-05-13 RX ORDER — FLUTICASONE PROPIONATE 50 MCG
2 SPRAY, SUSPENSION (ML) NASAL DAILY
Qty: 16 G | Refills: 1 | Status: SHIPPED | OUTPATIENT
Start: 2020-05-13 | End: 2020-07-28 | Stop reason: SDUPTHER

## 2020-05-18 DIAGNOSIS — G40.909 NONINTRACTABLE EPILEPSY WITHOUT STATUS EPILEPTICUS, UNSPECIFIED EPILEPSY TYPE (HCC): ICD-10-CM

## 2020-05-18 RX ORDER — CARBAMAZEPINE 300 MG/1
CAPSULE, EXTENDED RELEASE ORAL
Qty: 60 CAPSULE | Refills: 1 | Status: SHIPPED | OUTPATIENT
Start: 2020-05-18 | End: 2020-06-10

## 2020-05-28 ENCOUNTER — APPOINTMENT (OUTPATIENT)
Dept: LAB | Facility: HOSPITAL | Age: 35
End: 2020-05-28
Attending: PSYCHIATRY & NEUROLOGY
Payer: COMMERCIAL

## 2020-05-28 ENCOUNTER — TELEPHONE (OUTPATIENT)
Dept: NEUROLOGY | Facility: CLINIC | Age: 35
End: 2020-05-28

## 2020-05-28 DIAGNOSIS — M83.5 ANTICONVULSANT DRUG-INDUCED BONE SOFTENING: ICD-10-CM

## 2020-05-28 DIAGNOSIS — E55.9 VITAMIN D INSUFFICIENCY: ICD-10-CM

## 2020-05-28 DIAGNOSIS — T42.75XA ANTICONVULSANT DRUG-INDUCED BONE SOFTENING: ICD-10-CM

## 2020-05-28 DIAGNOSIS — G40.009 PARTIAL IDIOPATHIC EPILEPSY WITH SEIZURES OF LOCALIZED ONSET, NOT INTRACTABLE, WITHOUT STATUS EPILEPTICUS (HCC): ICD-10-CM

## 2020-05-28 LAB
25(OH)D3 SERPL-MCNC: 50.6 NG/ML (ref 30–100)
ALBUMIN SERPL BCP-MCNC: 4.3 G/DL (ref 3.5–5)
ALP SERPL-CCNC: 108 U/L (ref 46–116)
ALT SERPL W P-5'-P-CCNC: 29 U/L (ref 12–78)
ANION GAP SERPL CALCULATED.3IONS-SCNC: 5 MMOL/L (ref 4–13)
AST SERPL W P-5'-P-CCNC: 19 U/L (ref 5–45)
BILIRUB SERPL-MCNC: 0.31 MG/DL (ref 0.2–1)
BUN SERPL-MCNC: 9 MG/DL (ref 5–25)
CALCIUM SERPL-MCNC: 9.2 MG/DL (ref 8.3–10.1)
CARBAMAZEPINE SERPL-MCNC: 5.5 UG/ML (ref 4–12)
CHLORIDE SERPL-SCNC: 107 MMOL/L (ref 100–108)
CO2 SERPL-SCNC: 27 MMOL/L (ref 21–32)
CREAT SERPL-MCNC: 0.52 MG/DL (ref 0.6–1.3)
ERYTHROCYTE [DISTWIDTH] IN BLOOD BY AUTOMATED COUNT: 13.5 % (ref 11.6–15.1)
GFR SERPL CREATININE-BSD FRML MDRD: 126 ML/MIN/1.73SQ M
GLUCOSE P FAST SERPL-MCNC: 79 MG/DL (ref 65–99)
HCT VFR BLD AUTO: 40 % (ref 34.8–46.1)
HGB BLD-MCNC: 13.1 G/DL (ref 11.5–15.4)
MCH RBC QN AUTO: 30.7 PG (ref 26.8–34.3)
MCHC RBC AUTO-ENTMCNC: 32.8 G/DL (ref 31.4–37.4)
MCV RBC AUTO: 94 FL (ref 82–98)
PLATELET # BLD AUTO: 319 THOUSANDS/UL (ref 149–390)
PMV BLD AUTO: 9.4 FL (ref 8.9–12.7)
POTASSIUM SERPL-SCNC: 3.7 MMOL/L (ref 3.5–5.3)
PROT SERPL-MCNC: 7.8 G/DL (ref 6.4–8.2)
RBC # BLD AUTO: 4.27 MILLION/UL (ref 3.81–5.12)
SODIUM SERPL-SCNC: 139 MMOL/L (ref 136–145)
WBC # BLD AUTO: 4.47 THOUSAND/UL (ref 4.31–10.16)

## 2020-05-28 PROCEDURE — 85027 COMPLETE CBC AUTOMATED: CPT

## 2020-05-28 PROCEDURE — 36415 COLL VENOUS BLD VENIPUNCTURE: CPT

## 2020-05-28 PROCEDURE — 80053 COMPREHEN METABOLIC PANEL: CPT

## 2020-05-28 PROCEDURE — 82306 VITAMIN D 25 HYDROXY: CPT

## 2020-05-28 PROCEDURE — 80156 ASSAY CARBAMAZEPINE TOTAL: CPT

## 2020-06-08 ENCOUNTER — TELEPHONE (OUTPATIENT)
Dept: INTERNAL MEDICINE CLINIC | Facility: CLINIC | Age: 35
End: 2020-06-08

## 2020-06-08 DIAGNOSIS — M83.5 ANTICONVULSANT DRUG-INDUCED BONE SOFTENING: ICD-10-CM

## 2020-06-08 DIAGNOSIS — T42.75XA ANTICONVULSANT DRUG-INDUCED BONE SOFTENING: ICD-10-CM

## 2020-06-08 DIAGNOSIS — G43.809 HEADACHE, VARIANT MIGRAINE: Primary | ICD-10-CM

## 2020-06-08 DIAGNOSIS — G40.802 OTHER EPILEPSY WITHOUT STATUS EPILEPTICUS, NOT INTRACTABLE (HCC): ICD-10-CM

## 2020-06-10 DIAGNOSIS — G40.909 NONINTRACTABLE EPILEPSY WITHOUT STATUS EPILEPTICUS, UNSPECIFIED EPILEPSY TYPE (HCC): ICD-10-CM

## 2020-06-10 RX ORDER — CARBAMAZEPINE 300 MG/1
CAPSULE, EXTENDED RELEASE ORAL
Qty: 60 CAPSULE | Refills: 1 | Status: SHIPPED | OUTPATIENT
Start: 2020-06-10 | End: 2020-06-13 | Stop reason: SDUPTHER

## 2020-06-11 ENCOUNTER — TELEMEDICINE (OUTPATIENT)
Dept: NEUROLOGY | Facility: CLINIC | Age: 35
End: 2020-06-11
Payer: COMMERCIAL

## 2020-06-11 VITALS — WEIGHT: 142.4 LBS | BODY MASS INDEX: 28.71 KG/M2 | HEIGHT: 59 IN

## 2020-06-11 DIAGNOSIS — E55.9 VITAMIN D INSUFFICIENCY: ICD-10-CM

## 2020-06-11 DIAGNOSIS — G40.802 OTHER EPILEPSY WITHOUT STATUS EPILEPTICUS, NOT INTRACTABLE (HCC): Primary | ICD-10-CM

## 2020-06-11 DIAGNOSIS — G40.909 NONINTRACTABLE EPILEPSY WITHOUT STATUS EPILEPTICUS, UNSPECIFIED EPILEPSY TYPE (HCC): ICD-10-CM

## 2020-06-11 DIAGNOSIS — G43.809 HEADACHE, VARIANT MIGRAINE: ICD-10-CM

## 2020-06-11 DIAGNOSIS — F81.9 COGNITIVE DEVELOPMENTAL DELAY: ICD-10-CM

## 2020-06-11 DIAGNOSIS — R63.5 WEIGHT GAIN: ICD-10-CM

## 2020-06-11 PROCEDURE — G2012 BRIEF CHECK IN BY MD/QHP: HCPCS | Performed by: NURSE PRACTITIONER

## 2020-06-11 RX ORDER — DIPHENOXYLATE HYDROCHLORIDE AND ATROPINE SULFATE 2.5; .025 MG/1; MG/1
1 TABLET ORAL DAILY
COMMUNITY

## 2020-06-13 PROBLEM — R63.5 WEIGHT GAIN: Status: ACTIVE | Noted: 2020-06-13

## 2020-06-13 RX ORDER — CARBAMAZEPINE 300 MG/1
300 CAPSULE, EXTENDED RELEASE ORAL 2 TIMES DAILY
Qty: 60 CAPSULE | Refills: 11 | Status: SHIPPED | OUTPATIENT
Start: 2020-06-13 | End: 2021-04-14 | Stop reason: SDUPTHER

## 2020-06-13 RX ORDER — FOLIC ACID 1 MG/1
1000 TABLET ORAL DAILY
Qty: 30 TABLET | Refills: 11 | Status: SHIPPED | OUTPATIENT
Start: 2020-06-13 | End: 2021-06-04 | Stop reason: SDUPTHER

## 2020-07-16 ENCOUNTER — HOSPITAL ENCOUNTER (EMERGENCY)
Facility: HOSPITAL | Age: 35
Discharge: HOME | End: 2020-07-17
Attending: EMERGENCY MEDICINE | Admitting: EMERGENCY MEDICINE
Payer: COMMERCIAL

## 2020-07-16 VITALS
RESPIRATION RATE: 18 BRPM | DIASTOLIC BLOOD PRESSURE: 75 MMHG | HEART RATE: 91 BPM | OXYGEN SATURATION: 100 % | TEMPERATURE: 98.4 F | SYSTOLIC BLOOD PRESSURE: 138 MMHG
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
## 2020-07-16 PROCEDURE — 99283 EMERGENCY DEPT VISIT LOW MDM: CPT

## 2020-07-17 PROCEDURE — 99284 EMERGENCY DEPT VISIT MOD MDM: CPT | Performed by: EMERGENCY MEDICINE

## 2020-07-17 RX ORDER — DOXYCYCLINE HYCLATE 100 MG/1
100 CAPSULE ORAL 2 TIMES DAILY
Qty: 14 CAPSULE | Refills: 0 | Status: SHIPPED | OUTPATIENT
Start: 2020-07-17 | End: 2020-07-24

## 2020-07-17 RX ORDER — DOXYCYCLINE HYCLATE 100 MG/1
100 CAPSULE ORAL ONCE
Status: COMPLETED | OUTPATIENT
Start: 2020-07-17 | End: 2020-07-17

## 2020-07-17 RX ADMIN — DOXYCYCLINE HYCLATE 100 MG: 100 CAPSULE ORAL at 01:24

## 2020-07-17 NOTE — ED PROVIDER NOTES
History  Chief Complaint   Patient presents with    Dog Bite     Mother reports pt and her were walking wednesday night when a small unknown dog bit pt's R foot  Mother has noticed increased swelling and redness to the area  Mother states that to have a pet at the housing complex, animal must be vaccinated   Cellulitis     HPI  Patient is a 30yF presenting with a dog bite  Patient is known to have developmental delay and is accompanied by mother who speaks on her behalf  The dog bite occurred on the evening of 7/15 and it resulted in a superficial puncture wound located in the left foot between the 3rd and 4th toe  It appeared minor and so they decided to not seek medical attention  On the next day she noticed that the dorsal surface of the foot turned red and appeared swollen along with pain  Patient endorses no other symptoms  Denies fevers, chills, n/v, chest pain, difficulty breathing, abdominal pain  According to the mother the patient received her tetanus shot around 4 yrs ago  The dog that bit her belonged to a neighbor in the same apartment complex where they have a strict pet policy that requires vaccination  Prior to Admission Medications   Prescriptions Last Dose Informant Patient Reported? Taking?    CARBATROL 300 MG 12 hr capsule   No Yes   Sig: Take 1 capsule (300 mg total) by mouth 2 (two) times a day   Cholecalciferol (RA Vitamin D-3) 50 MCG (2000 UT) CAPS   No Yes   Sig: Take 2 capsules (4,000 Units total) by mouth daily   fluticasone (FLONASE) 50 mcg/act nasal spray   No Yes   Si sprays into each nostril daily   folic acid (FOLVITE) 1 mg tablet   No Yes   Sig: Take 1 tablet (1,000 mcg total) by mouth daily   hydrocortisone 1 % cream   No No   Sig: Apply topically 3 (three) times a day for 7 days   loratadine (Claritin) 10 mg tablet   No Yes   Sig: Take 1 tablet (10 mg total) by mouth daily   multivitamin (THERAGRAN) TABS  Self Yes Yes   Sig: Take 1 tablet by mouth daily topiramate (TOPAMAX) 25 mg tablet   No Yes   Sig: Take 2 tablets (50 mg total) by mouth as needed (migraine headache)      Facility-Administered Medications: None       History reviewed  No pertinent past medical history  Past Surgical History:   Procedure Laterality Date    NO PAST SURGERIES         Family History   Problem Relation Age of Onset   [de-identified] Migraines Mother     No Known Problems Father      I have reviewed and agree with the history as documented  E-Cigarette/Vaping     E-Cigarette/Vaping Substances     Social History     Tobacco Use    Smoking status: Never Smoker    Smokeless tobacco: Never Used   Substance Use Topics    Alcohol use: No    Drug use: No        Review of Systems   Constitutional: Negative for activity change, appetite change, chills and fever  HENT: Negative for sore throat and trouble swallowing  Eyes: Negative for discharge and itching  Respiratory: Negative for chest tightness and shortness of breath  Cardiovascular: Negative for chest pain and leg swelling  Gastrointestinal: Negative for abdominal distention, abdominal pain, nausea and vomiting  Endocrine: Negative  Genitourinary: Negative for difficulty urinating, dyspareunia and dysuria  Skin: Positive for color change and wound  Erythematous left dorsal foot  Small puncture wound between toes   Allergic/Immunologic: Negative  Neurological: Negative  Hematological: Negative  Psychiatric/Behavioral: Negative          Physical Exam  ED Triage Vitals [07/16/20 2352]   Temperature Pulse Respirations Blood Pressure SpO2   98 4 °F (36 9 °C) 91 18 138/75 100 %      Temp Source Heart Rate Source Patient Position - Orthostatic VS BP Location FiO2 (%)   Oral Monitor Lying Right arm --      Pain Score       --             Orthostatic Vital Signs  Vitals:    07/16/20 2352   BP: 138/75   Pulse: 91   Patient Position - Orthostatic VS: Lying       Physical Exam   Constitutional: She appears well-developed and well-nourished  HENT:   Head: Normocephalic and atraumatic  Right Ear: External ear normal    Left Ear: External ear normal    Nose: Nose normal    Eyes: Pupils are equal, round, and reactive to light  Conjunctivae and EOM are normal    Neck: Normal range of motion  Neck supple  Cardiovascular: Normal rate, regular rhythm and normal heart sounds  Pulmonary/Chest: Effort normal and breath sounds normal  No respiratory distress  Abdominal: Soft  Bowel sounds are normal  She exhibits no distension  There is no tenderness  Musculoskeletal: She exhibits tenderness  Tenderness in the dorsal surface of the left foot   Skin: Skin is warm  Capillary refill takes less than 2 seconds  There is erythema  Erythema in the dorsal surface of the left foot  Puncture wound between the 3rd and 4th left toe   Psychiatric: She has a normal mood and affect  Her behavior is normal        ED Medications  Medications   doxycycline hyclate (VIBRAMYCIN) capsule 100 mg (100 mg Oral Given 7/17/20 0124)       Diagnostic Studies  Results Reviewed     None                 No orders to display         Procedures  Procedures      ED Course       US AUDIT      Most Recent Value   Initial Alcohol Screen: US AUDIT-C    1  How often do you have a drink containing alcohol?  0 Filed at: 07/17/2020 0001   2  How many drinks containing alcohol do you have on a typical day you are drinking? 0 Filed at: 07/17/2020 0001   3a  Male UNDER 65: How often do you have five or more drinks on one occasion? 0 Filed at: 07/17/2020 0001   3b  FEMALE Any Age, or MALE 65+: How often do you have 4 or more drinks on one occassion? 0 Filed at: 07/17/2020 0001   Audit-C Score  0 Filed at: 07/17/2020 0001                  SAILAJA/DAST-10      Most Recent Value   How many times in the past year have you    Used an illegal drug or used a prescription medication for non-medical reasons?   Never Filed at: 07/17/2020 0002 MDM  Number of Diagnoses or Management Options  Dog bite, initial encounter:   Diagnosis management comments: Patient is 30yF presenting with dog bite  Left foot appears slightly swollen with erythema  Puncture wound is noted between the third and fourth foot  No other symptoms  Tetanus shot around 4 yrs ago  Patient's mother is positive that the dog received its rabies vaccine  Discharged with 100 of doxy for 7 days  Disposition  Final diagnoses:   Dog bite, initial encounter     Time reflects when diagnosis was documented in both MDM as applicable and the Disposition within this note     Time User Action Codes Description Comment    7/17/2020  1:21 AM Clarence Denton  0XXA] Dog bite, initial encounter       ED Disposition     ED Disposition Condition Date/Time Comment    Discharge Stable Fri Jul 17, 2020  1:21 AM Dustin Haas discharge to home/self care  Follow-up Information     Follow up With Specialties Details Why Contact Info    [facility]  [facility]iatry Schedule an appointment as soon as possible for a visit in 1 week For wound re-check [address] [facility] [address]  [phone]            Patient's Medications   Discharge Prescriptions    DOXYCYCLINE HYCLATE (VIBRAMYCIN) 100 MG CAPSULE    Take 1 capsule (100 mg total) by mouth 2 (two) times a day for 7 days       Start Date: 7/17/2020 End Date: 7/24/2020       Order Dose: 100 mg       Quantity: 14 capsule    Refills: 0     No discharge procedures on file  PDMP Review     None           ED Provider  Attending physically available and evaluated Elo Phillips I managed the patient along with the ED Attending      Electronically Signed by         Galo Mcgrath MD  07/17/20 0205       Galo Mcgrath MD  07/17/20 3134

## 2020-07-17 NOTE — ED ATTENDING ATTESTATION
7/16/2020  IRoberto MD, saw and evaluated the patient  I have discussed the patient with the resident/non-physician practitioner and agree with the resident's/non-physician practitioner's findings, Plan of Care, and MDM as documented in the resident's/non-physician practitioner's note, except where noted  All available labs and Radiology studies were reviewed  I was present for key portions of any procedure(s) performed by the resident/non-physician practitioner and I was immediately available to provide assistance  At this point I agree with the current assessment done in the Emergency Department  I have conducted an independent evaluation of this patient a history and physical is as follows:    ED Course     Emergency Department Note- Aman Andrews 36 y o  female MRN: [id number]    Unit/Bed#: ED 09 Encounter: [id number]    Aman Andrews is a 36 y o  female who presents with   Chief Complaint   Patient presents with    Dog Bite     Mother reports pt and her were walking wednesday night when a small unknown dog bit pt's R foot  Mother has noticed increased swelling and redness to the area  Mother states that to have a pet at the housing complex, animal must be vaccinated   Cellulitis         History of Present Illness   HPI:  Aman Andrews is a 36 y o  female who presents for evaluation of:  Dog bite left foot Wednesday night  Patient has more pain and inflammation tonight  Patient is up to date with tetanus vaccine  Dog is reportedly UTD with vaccines as well  Review of Systems   Constitutional: Negative for chills and fever  HENT: Negative for congestion and rhinorrhea  Musculoskeletal: Negative for back pain and neck pain  All other systems reviewed and are negative  Historical Information   History reviewed  No pertinent past medical history    Past Surgical History:   Procedure Laterality Date    NO PAST SURGERIES       Social History   Social History Substance and Sexual Activity   Alcohol Use No     Social History     Substance and Sexual Activity   Drug Use No     Social History     Tobacco Use   Smoking Status Never Smoker   Smokeless Tobacco Never Used     Family History: non-contributory    Meds/Allergies   all medications and allergies reviewed  Allergies   Allergen Reactions    Contrast [Iodinated Diagnostic Agents]     Dust Mite Extract     Nickel     Penicillins     Pollen Extract     Short Ragweed Pollen Ext        Objective   First Vitals:   Blood Pressure: 138/75 (20)  Pulse: 91 (20)  Temperature: 98 4 °F (36 9 °C) (20)  Temp Source: Oral (20)  Respirations: 18 (20)  SpO2: 100 % (20)    Current Vitals:   Blood Pressure: 138/75 (20)  Pulse: 91 (20)  Temperature: 98 4 °F (36 9 °C) (20)  Temp Source: Oral (20)  Respirations: 18 (20)  SpO2: 100 % (20)    No intake or output data in the 24 hours ending 20 0110    Invasive Devices     None                 Physical Exam   Constitutional: She is oriented to person, place, and time  She appears well-developed and well-nourished  HENT:   Head: Normocephalic and atraumatic  Musculoskeletal: Normal range of motion  She exhibits no deformity  Neurological: She is alert and oriented to person, place, and time  Skin: Skin is warm and dry  Capillary refill takes less than 2 seconds  Psychiatric: She has a normal mood and affect  Her behavior is normal  Judgment and thought content normal    Nursing note and vitals reviewed  35 YO F in no distress      Mild erythema    Medical Decision Makin  Bite wound left foot: doxycycline because patient is penicillin allergic; f/u podiatry; animal bite form filled out  No results found for this or any previous visit (from the past 36 hour(s))    No orders to display         Portions of the record may have been created with voice recognition software  Occasional wrong word or "sound a like" substitutions may have occurred due to the inherent limitations of voice recognition software  Read the chart carefully and recognize, using context, where substitutions have occurred          Critical Care Time  Procedures

## 2020-07-24 ENCOUNTER — OFFICE VISIT (OUTPATIENT)
Dept: INTERNAL MEDICINE CLINIC | Facility: CLINIC | Age: 35
End: 2020-07-24

## 2020-07-24 VITALS
SYSTOLIC BLOOD PRESSURE: 120 MMHG | TEMPERATURE: 97.8 F | WEIGHT: 145.94 LBS | OXYGEN SATURATION: 99 % | BODY MASS INDEX: 31.49 KG/M2 | HEIGHT: 57 IN | HEART RATE: 89 BPM | DIASTOLIC BLOOD PRESSURE: 80 MMHG
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
## 2020-07-24 DIAGNOSIS — F81.9 COGNITIVE DEVELOPMENTAL DELAY: ICD-10-CM

## 2020-07-24 DIAGNOSIS — E66.09 CLASS 1 OBESITY DUE TO EXCESS CALORIES WITHOUT SERIOUS COMORBIDITY WITH BODY MASS INDEX (BMI) OF 31.0 TO 31.9 IN ADULT: Primary | ICD-10-CM

## 2020-07-24 PROBLEM — E66.811 CLASS 1 OBESITY DUE TO EXCESS CALORIES WITHOUT SERIOUS COMORBIDITY WITH BODY MASS INDEX (BMI) OF 31.0 TO 31.9 IN ADULT: Status: ACTIVE | Noted: 2020-07-24

## 2020-07-24 PROBLEM — E66.3 OVERWEIGHT (BMI 25.0-29.9): Chronic | Status: RESOLVED | Noted: 2019-06-06 | Resolved: 2020-07-24

## 2020-07-24 PROBLEM — Z11.1 TUBERCULOSIS SCREENING: Chronic | Status: RESOLVED | Noted: 2019-06-06 | Resolved: 2020-07-24

## 2020-07-24 PROCEDURE — 99213 OFFICE O/P EST LOW 20 MIN: CPT | Performed by: PHYSICIAN ASSISTANT

## 2020-07-24 PROCEDURE — 3008F BODY MASS INDEX DOCD: CPT | Performed by: PHYSICIAN ASSISTANT

## 2020-07-24 PROCEDURE — 1036F TOBACCO NON-USER: CPT | Performed by: PHYSICIAN ASSISTANT

## 2020-07-24 NOTE — PATIENT INSTRUCTIONS
As we reviewed today labs show that your sugar levels are normal   Liver functions are also normal   Thyroid checked last year and normal has been reordered today at your request we will call you with those results  Did review importance of healthy diet and exercise to achieve weight loss goals  Referral to nutritionist provided today to assist you  We reviewed the area for follow-up from the dog bite on your left foot and wound is healing well with no complications  Control del peso   LO QUE NECESITA SABER:   ¿Por qué es importante controlar mi peso corporal?  Tener sobrepeso aumenta leblanc riesgo de presentar problemas de peg cally enfermedades del corazón, hipertensión, diabetes tipo 2 y ciertos tipos de cáncer  También puede aumentar leblanc riesgo de presentar osteoartritis, apnea del sueño y otros problemas respiratorios  Trate de bajar de peso de forma gradual y [place]  Incluso megan mínima pérdida de peso puede disminuir leblanc riesgo de problemas de [place]  ¿Cómo puedo bajar de peso de [place] forma iglesias? Megan forma iglesias y saludable para perder peso es consumir menos calorías y realizar megan actividad física en forma regular  Usted puede perder hasta 1 rebecca por semana al reducir el consumo de 500 calorías cada día  Usted puede reducir el consumo de calorías al comer porciones más pequeñas o eliminar los alimentos con alto contenido de calorías  Isabela las etiquetas para determinar la cantidad de calorías que contienen los alimentos que consume  También puede quemar calorías al realizar ejercicio cally caminar, nadar o montar en bicicleta  Es más probable que usted [employer] peso si hace de estos cambios parte de leblanc estilo de edinson  ¿Cuál es un plan de alimentación qué me puede ayudar a controlar mi peso? Un plan de alimentación saludable incluye megan variedad de alimentos, contiene más pocas calorías y lo [place] a estar saludable   Un plan de alimentación saludable incluye lo siguiente:  · Consuma alimentos de grano integral con más frecuencia  Un plan de alimentación saludable debe contener alimentos con fibra  La fibra es la parte de las frutas, verduras y granos que leblanc cuerpo no puede digerir  Los alimentos de granos integrales son saludables y suministran fibra adicional a leblanc Zeinab Reagin  Algunos ejemplos de alimentos de granos integrales son los panes integrales, pastas integrales, la alisson, el arroz integral y shonda de bulgur  · Consuma megan variedad de verduras todos los días  [address], coliflor, col michael y [place]  Coma verduras anaranjadas cally las zanahorias, maria dolores dulces y calabaza de invierno  · Consuma megan variedad de frutas todos los keven  Escoja frutas frescas o enlatadas en leblanc propio jugo o con jugo bajo en [place] en vez de jugo  El [place] de frutas tiene [address] o antoinette nada de Danielle  · Consuma productos lácteos con bajo contenido de [place]  [address] de 1%  Consuma yogur descremado y requesón (cottage) semidescremado  Trate de consumir quesos descremados cally el queso mozzarela y otros quesos semidescremado  · Seleccione nellie y otros alimentos con proteínas bajos en grasa  Escoja nettie u [address]  Seleccione pescado, carne de aves sin piel (cally el hussain o [place]), herrera de carne [place] (de res o de cerdo)  Antes de cocinar las nellie o las aves halima cualquier parte de grasa visible  · Use menos grasas y aceites  Trate de hornear los alimentos en lugar de freírlos  Agregue a las [address], cally Rebeca, delonte [place], condimentos para [place] y [place]  Consuma menos alimentos de alto tenor graso  Coma menos alimentos altos en grasa cally las maria dolores fritas, donas, helados y pasteles  · Consuma menos dulces  Limite los alimentos y las bebidas con un gran contenido de azúcar  Estos incluyen los caramelos, galletas, gaseosas normales y bebidas endulzadas    ¿Cuáles son las otras formas en que puedo disminuir las calorías? · Reduzca el tamaño de las porciones  ¨ Use platos pequeños para servirse porciones pequeñas  ¨ No coma segundas porciones  ¨ Cuando coma en un restaurante, pida megan caja y guarde en nely la mitad de la comida antes de empezar a comer  ¨ Comparta con alguien un plato de entrada  · Reemplace los bocadillos o meriendas altos en calorías por los saludables de menos calorías  ¨ Escoja frutas frescas, verduras, galletas de arroz bajo en grasa o palomitas de maíz en lugar de comer maria dolores fritas de paquete, nueces o dulces de chocolate  ¨ Roseto agua o bebidas dietéticas en lugar de las endulzadas  · Coma missael comidas regularmente  No omita ninguna comida porque esto puede conducir a comer más a megan hora más tarde del día  Si no tiene tiempo para hacer comidas regulares, consuma un refrigerio saludable  · No vaya al humphrey cuando tenga hambre  Usted podría ser más propenso a elegir alimentos no saludables  Lleve megan lista de alimentos saludables y vaya de compras después de malaika comido  ¿Qué cantidad de actividad física necesito? Realice megan actividad física por lo menos 30 minutos al día, la mayoría de los días de la [place]  Algunos de los ejercicios incluyen caminar, montar en bicicleta, bailar y nadar  Usted también puede realizar más actividad física usando las escaleras en vez de los ascensores o estacionarse más lejos cuando Radha [place] a las tiendas  Pregunte a leblanc médico acerca del mejor plan de ejercicio para usted  ¿Qué más debería tener en cuenta mientras trato de bajar de peso? · Esté consciente de las situaciones que podrían ocasionarle ganas de comer en exceso, cally el comer mientras jerry la televisión  Busque formas para evitar estas situaciones  Por ejemplo, leer un libro, caminar o hacer trabajos manuales  · Reúnase con un estela de apoyo o con personas que también están tratando de bajar de [place]   [place] le puede ayudar a mantenerse motivado y continuar progresando en leblanc objetivo de perder peso  ACUERDOS SOBRE LEBLANC CUIDADO:   Usted tiene el derecho de ayudar a planear leblanc cuidado  Aprenda todo lo que pueda sobre leblanc condición y cally darle tratamiento  Discuta missael opciones de tratamiento con missael médicos para decidir el cuidado que usted desea recibir  Usted siempre tiene el derecho de rechazar el tratamiento  Esta información es sólo para uso en educación  Leblanc intención no es darle un consejo médico sobre enfermedades o tratamientos  Colsulte con leblanc Boone Keas farmacéutico antes de seguir cualquier régimen médico para saber si es seguro y efectivo para usted  © 2017 [address]  Information is for End User's use only and may not be sold, redistributed or otherwise used for commercial purposes  All illustrations and images included in CareNotes® are the copyrighted property of A D A M , Inc  or Diego Haskins

## 2020-07-24 NOTE — PROGRESS NOTES
Assessment/Plan:  As we reviewed today labs show that your sugar levels are normal   Liver functions are also normal   Thyroid checked last year and normal has been reordered today at your request we will call you with those results  Did review importance of healthy diet and exercise to achieve weight loss goals  Referral to nutritionist provided today to assist you  We reviewed the area for follow-up from the dog bite on your left foot and wound is healing well with no complications  No problem-specific Assessment & Plan notes found for this encounter  Diagnoses and all orders for this visit:    Class 1 obesity due to excess calories without serious comorbidity with body mass index (BMI) of 31 0 to 31 9 in adult  -     Ambulatory referral to Nutrition Services; Future  -     TSH, 3rd generation with Free T4 reflex    Cognitive developmental delay    Dog bite, subsequent encounter          Subjective:      Patient ID: Nagi Hastings is a 36 y o  female  Patient brought by mother due to weight gain, review shows gained about 8 Lbs in past year    Not DM and TSH was normal last year    States diet is the same, when asked what she eats patient states she eats spaghetti  Patient does have learning disability and cognitive delay  Also followed by neurology for seizures  Patient is on Carbatrol liver enzymes monitored and are normal   Patient also on Topamax for migraines this would actually help with weight loss  Patient and mother report they are not walking or doing any forms of exercise  Patient also wanted me to recheck her left foot  Patient was seen in the emergency room last week on the 16th for a dog bite to between 4th and 5th toes on her left foot  Mother confirms completed the antibiotic course today  Patient reports no pain          The following portions of the patient's history were reviewed and updated as appropriate: allergies, current medications, past family history, past medical history, past social history, past surgical history and problem list     Review of Systems   Constitutional: Negative  HENT: Negative  Respiratory: Negative  Negative for cough  Cardiovascular: Negative  Gastrointestinal: Negative  Endocrine: Negative for polydipsia, polyphagia and polyuria  Musculoskeletal: Negative  Skin: Positive for wound  Neurological: Positive for seizures and headaches  Psychiatric/Behavioral: Positive for decreased concentration  Objective:      /80 (BP Location: Right arm, Patient Position: Sitting, Cuff Size: Standard)   Pulse 89   Temp 97 8 °F (36 6 °C) (Temporal)   Ht 4' 9" (1 448 m)   Wt 66 2 kg (145 lb 15 1 oz)   SpO2 99%   BMI 31 58 kg/m²          Physical Exam   Constitutional: She appears well-nourished  No distress  HENT:   Head: Normocephalic  Neck: Neck supple  No thyromegaly present  Cardiovascular: Normal rate, regular rhythm and normal heart sounds  Pulmonary/Chest: Effort normal and breath sounds normal    Abdominal: Soft  Bowel sounds are normal  There is no tenderness  Musculoskeletal: She exhibits no edema  Neurological: She is alert  Skin:   Examination where patient mother reports she had the dog bite shows that there is a healing scab from bite wound  No erythema no increased temperature and nontender to palpation  Wound is closed and healing well  Nursing note and vitals reviewed  BMI Counseling: Body mass index is 31 58 kg/m²  The BMI is above normal  Patient referred to nutritionist due to patient being obese

## 2020-07-28 DIAGNOSIS — J30.89 CHRONIC NONSEASONAL ALLERGIC RHINITIS DUE TO POLLEN: ICD-10-CM

## 2020-07-29 RX ORDER — FLUTICASONE PROPIONATE 50 MCG
2 SPRAY, SUSPENSION (ML) NASAL DAILY
Qty: 16 G | Refills: 1 | Status: SHIPPED | OUTPATIENT
Start: 2020-07-29 | End: 2020-09-23

## 2020-08-11 ENCOUNTER — APPOINTMENT (OUTPATIENT)
Dept: LAB | Facility: HOSPITAL | Age: 35
End: 2020-08-11
Payer: COMMERCIAL

## 2020-08-11 LAB — TSH SERPL DL<=0.05 MIU/L-ACNC: 0.79 UIU/ML (ref 0.36–3.74)

## 2020-08-11 PROCEDURE — 84443 ASSAY THYROID STIM HORMONE: CPT | Performed by: PHYSICIAN ASSISTANT

## 2020-08-11 PROCEDURE — 36415 COLL VENOUS BLD VENIPUNCTURE: CPT | Performed by: PHYSICIAN ASSISTANT

## 2020-08-12 DIAGNOSIS — R79.89 LOW SERUM THYROID STIMULATING HORMONE (TSH): Primary | ICD-10-CM

## 2020-09-17 ENCOUNTER — APPOINTMENT (OUTPATIENT)
Dept: LAB | Facility: HOSPITAL | Age: 35
End: 2020-09-17
Payer: COMMERCIAL

## 2020-09-17 DIAGNOSIS — R79.89 LOW SERUM THYROID STIMULATING HORMONE (TSH): ICD-10-CM

## 2020-09-17 LAB
T4 FREE SERPL-MCNC: 0.7 NG/DL (ref 0.76–1.46)
TSH SERPL DL<=0.05 MIU/L-ACNC: 0.85 UIU/ML (ref 0.36–3.74)

## 2020-09-17 PROCEDURE — 36415 COLL VENOUS BLD VENIPUNCTURE: CPT

## 2020-09-17 PROCEDURE — 84439 ASSAY OF FREE THYROXINE: CPT

## 2020-09-17 PROCEDURE — 84443 ASSAY THYROID STIM HORMONE: CPT

## 2020-09-23 DIAGNOSIS — J30.89 CHRONIC NONSEASONAL ALLERGIC RHINITIS DUE TO POLLEN: ICD-10-CM

## 2020-09-23 RX ORDER — FLUTICASONE PROPIONATE 50 MCG
SPRAY, SUSPENSION (ML) NASAL
Qty: 16 G | Refills: 1 | Status: SHIPPED | OUTPATIENT
Start: 2020-09-23 | End: 2020-11-19

## 2020-10-01 DIAGNOSIS — E03.9 HYPOTHYROIDISM, UNSPECIFIED TYPE: Primary | ICD-10-CM

## 2020-10-01 RX ORDER — LEVOTHYROXINE SODIUM 0.05 MG/1
50 TABLET ORAL DAILY
Qty: 90 TABLET | Refills: 0 | Status: SHIPPED | OUTPATIENT
Start: 2020-10-01 | End: 2020-11-12

## 2020-11-11 ENCOUNTER — LAB (OUTPATIENT)
Dept: LAB | Facility: HOSPITAL | Age: 35
End: 2020-11-11
Payer: COMMERCIAL

## 2020-11-11 DIAGNOSIS — E03.9 HYPOTHYROIDISM, UNSPECIFIED TYPE: ICD-10-CM

## 2020-11-11 LAB
T4 FREE SERPL-MCNC: 0.73 NG/DL (ref 0.76–1.46)
TSH SERPL DL<=0.05 MIU/L-ACNC: 0.27 UIU/ML (ref 0.36–3.74)

## 2020-11-11 PROCEDURE — 84439 ASSAY OF FREE THYROXINE: CPT

## 2020-11-11 PROCEDURE — 84443 ASSAY THYROID STIM HORMONE: CPT

## 2020-11-11 PROCEDURE — 36415 COLL VENOUS BLD VENIPUNCTURE: CPT

## 2020-11-12 DIAGNOSIS — E06.3 HYPOTHYROIDISM DUE TO HASHIMOTO'S THYROIDITIS: Primary | ICD-10-CM

## 2020-11-12 DIAGNOSIS — E03.8 HYPOTHYROIDISM DUE TO HASHIMOTO'S THYROIDITIS: Primary | ICD-10-CM

## 2020-11-12 DIAGNOSIS — E55.9 VITAMIN D INSUFFICIENCY: ICD-10-CM

## 2020-11-12 RX ORDER — LEVOTHYROXINE SODIUM 0.07 MG/1
75 TABLET ORAL DAILY
Qty: 90 TABLET | Refills: 0 | Status: SHIPPED | OUTPATIENT
Start: 2020-11-12 | End: 2021-01-26 | Stop reason: SDUPTHER

## 2020-11-12 RX ORDER — GLUCOSAMINE/CHONDR SU A SOD 750-600 MG
2 TABLET ORAL DAILY
Qty: 60 CAPSULE | Refills: 5 | Status: SHIPPED | OUTPATIENT
Start: 2020-11-12 | End: 2021-04-19 | Stop reason: SDUPTHER

## 2020-11-19 DIAGNOSIS — J30.89 CHRONIC NONSEASONAL ALLERGIC RHINITIS DUE TO POLLEN: ICD-10-CM

## 2020-11-19 RX ORDER — FLUTICASONE PROPIONATE 50 MCG
SPRAY, SUSPENSION (ML) NASAL
Qty: 16 G | Refills: 1 | Status: SHIPPED | OUTPATIENT
Start: 2020-11-19 | End: 2020-11-19 | Stop reason: SDUPTHER

## 2020-11-19 RX ORDER — FLUTICASONE PROPIONATE 50 MCG
2 SPRAY, SUSPENSION (ML) NASAL DAILY
Qty: 16 G | Refills: 1 | Status: SHIPPED | OUTPATIENT
Start: 2020-11-19

## 2020-12-30 ENCOUNTER — LAB (OUTPATIENT)
Dept: LAB | Facility: HOSPITAL | Age: 35
End: 2020-12-30
Payer: COMMERCIAL

## 2020-12-30 DIAGNOSIS — E03.8 HYPOTHYROIDISM DUE TO HASHIMOTO'S THYROIDITIS: ICD-10-CM

## 2020-12-30 DIAGNOSIS — E06.3 HYPOTHYROIDISM DUE TO HASHIMOTO'S THYROIDITIS: ICD-10-CM

## 2020-12-30 LAB
T4 FREE SERPL-MCNC: 1 NG/DL (ref 0.76–1.46)
TSH SERPL DL<=0.05 MIU/L-ACNC: <0.007 UIU/ML (ref 0.36–3.74)

## 2020-12-30 PROCEDURE — 84439 ASSAY OF FREE THYROXINE: CPT

## 2020-12-30 PROCEDURE — 86376 MICROSOMAL ANTIBODY EACH: CPT

## 2020-12-30 PROCEDURE — 36415 COLL VENOUS BLD VENIPUNCTURE: CPT

## 2020-12-30 PROCEDURE — 84443 ASSAY THYROID STIM HORMONE: CPT

## 2020-12-30 PROCEDURE — 86800 THYROGLOBULIN ANTIBODY: CPT

## 2020-12-31 LAB
THYROGLOB AB SERPL-ACNC: <1 IU/ML (ref 0–0.9)
THYROPEROXIDASE AB SERPL-ACNC: <9 IU/ML (ref 0–34)

## 2021-01-11 DIAGNOSIS — E06.3 HYPOTHYROIDISM DUE TO HASHIMOTO'S THYROIDITIS: Primary | ICD-10-CM

## 2021-01-11 DIAGNOSIS — E03.8 HYPOTHYROIDISM DUE TO HASHIMOTO'S THYROIDITIS: Primary | ICD-10-CM

## 2021-01-19 DIAGNOSIS — J30.89 CHRONIC NONSEASONAL ALLERGIC RHINITIS DUE TO POLLEN: ICD-10-CM

## 2021-01-20 RX ORDER — LORATADINE 10 MG/1
TABLET ORAL
Qty: 90 TABLET | Refills: 1 | Status: SHIPPED | OUTPATIENT
Start: 2021-01-20 | End: 2021-07-13

## 2021-01-26 DIAGNOSIS — E06.3 HYPOTHYROIDISM DUE TO HASHIMOTO'S THYROIDITIS: ICD-10-CM

## 2021-01-26 DIAGNOSIS — E03.8 HYPOTHYROIDISM DUE TO HASHIMOTO'S THYROIDITIS: ICD-10-CM

## 2021-01-27 RX ORDER — LEVOTHYROXINE SODIUM 0.07 MG/1
75 TABLET ORAL DAILY
Qty: 90 TABLET | Refills: 0 | Status: SHIPPED | OUTPATIENT
Start: 2021-01-27 | End: 2021-04-15 | Stop reason: SDUPTHER

## 2021-02-04 DIAGNOSIS — E03.8 HYPOTHYROIDISM DUE TO HASHIMOTO'S THYROIDITIS: ICD-10-CM

## 2021-02-04 DIAGNOSIS — E06.3 HYPOTHYROIDISM DUE TO HASHIMOTO'S THYROIDITIS: ICD-10-CM

## 2021-02-04 RX ORDER — LEVOTHYROXINE SODIUM 0.07 MG/1
TABLET ORAL
Qty: 90 TABLET | Refills: 0 | OUTPATIENT
Start: 2021-02-04

## 2021-02-04 NOTE — TELEPHONE ENCOUNTER
Please contact patient's pharmacy  This prescription was just sent on January 27th for 90 days with receipt confirmed    Thank you

## 2021-03-10 DIAGNOSIS — E06.3 HYPOTHYROIDISM DUE TO HASHIMOTO'S THYROIDITIS: Primary | ICD-10-CM

## 2021-03-10 DIAGNOSIS — E03.8 HYPOTHYROIDISM DUE TO HASHIMOTO'S THYROIDITIS: Primary | ICD-10-CM

## 2021-03-10 LAB
T4 FREE SERPL DIALY-MCNC: 1.6 NG/DL (ref 0.9–2.2)
T4 FREE SERPL-MCNC: 1 NG/DL (ref 0.8–1.8)
TSH SERPL-ACNC: 0.01 MIU/L

## 2021-03-19 ENCOUNTER — OFFICE VISIT (OUTPATIENT)
Dept: INTERNAL MEDICINE CLINIC | Facility: CLINIC | Age: 36
End: 2021-03-19

## 2021-03-19 VITALS
SYSTOLIC BLOOD PRESSURE: 130 MMHG | WEIGHT: 144.2 LBS | HEIGHT: 57 IN | TEMPERATURE: 98.4 F | HEART RATE: 84 BPM | OXYGEN SATURATION: 99 % | BODY MASS INDEX: 31.11 KG/M2 | DIASTOLIC BLOOD PRESSURE: 80 MMHG

## 2021-03-19 DIAGNOSIS — F81.9 COGNITIVE DEVELOPMENTAL DELAY: ICD-10-CM

## 2021-03-19 DIAGNOSIS — Z00.00 ENCOUNTER FOR ANNUAL HEALTH EXAMINATION: ICD-10-CM

## 2021-03-19 DIAGNOSIS — L70.0 ACNE VULGARIS: ICD-10-CM

## 2021-03-19 DIAGNOSIS — L68.0 HIRSUTISM: ICD-10-CM

## 2021-03-19 DIAGNOSIS — E03.8 HYPOTHYROIDISM DUE TO HASHIMOTO'S THYROIDITIS: Primary | ICD-10-CM

## 2021-03-19 DIAGNOSIS — E06.3 HYPOTHYROIDISM DUE TO HASHIMOTO'S THYROIDITIS: Primary | ICD-10-CM

## 2021-03-19 DIAGNOSIS — D27.0 DERMOID CYST OF RIGHT OVARY: ICD-10-CM

## 2021-03-19 DIAGNOSIS — Z13.220 SCREENING FOR LIPID DISORDERS: ICD-10-CM

## 2021-03-19 DIAGNOSIS — E66.09 CLASS 1 OBESITY DUE TO EXCESS CALORIES WITHOUT SERIOUS COMORBIDITY WITH BODY MASS INDEX (BMI) OF 31.0 TO 31.9 IN ADULT: ICD-10-CM

## 2021-03-19 PROCEDURE — 99395 PREV VISIT EST AGE 18-39: CPT | Performed by: PHYSICIAN ASSISTANT

## 2021-03-19 RX ORDER — CLINDAMYCIN AND BENZOYL PEROXIDE 10; 50 MG/G; MG/G
GEL TOPICAL DAILY
Qty: 35 G | Refills: 1 | Status: SHIPPED | OUTPATIENT
Start: 2021-03-19 | End: 2021-03-29 | Stop reason: CLARIF

## 2021-03-19 NOTE — PATIENT INSTRUCTIONS
As per our discussion you are aware script has been reprinted to complete her follow-up thyroid testing at [address]  [facility]  This also includes her screening tests for sugar and cholesterol  As per discussion with the change in hair growth on her face this is called her citizen please get the additional labs completed  Please also schedule the pelvic ultrasound for further evaluation for possible polycystic ovarian and does have a history of a right ovarian dermoid cyst in the past     For her acne a topical gel has been sent to the pharmacy  Please use this once daily but if she develops significant redness or drying however skin reduce this to once every other day  We will contact you with the results of the labs and ultrasound and if all are normal will also then provide referral to nutritionist for additional guidance for healthy meaningful weight loss  You are aware as per our discussion to increase her water intake  Also recommend increased physical activity and walking with warmer weather

## 2021-03-19 NOTE — PROGRESS NOTES
Assessment/Plan:  As per our discussion you are aware script has been reprinted to complete her follow-up thyroid testing at [address]  [facility]  This also includes her screening tests for sugar and cholesterol  As per discussion with the change in hair growth on her face this is called hirsutism  please get the additional labs completed  Please also schedule the pelvic ultrasound for further evaluation for possible polycystic ovarian and does have a history of a right ovarian dermoid cyst in the past     For her acne a topical gel has been sent to the pharmacy  Please use this once daily but if she develops significant redness or drying however skin reduce this to once every other day  We will contact you with the results of the labs and ultrasound and if all are normal will also then provide referral to nutritionist for additional guidance for healthy meaningful weight loss  You are aware as per our discussion to increase her water intake  Also recommend increased physical activity and walking with warmer weather  No problem-specific Assessment & Plan notes found for this encounter  Diagnoses and all orders for this visit:    Hypothyroidism due to Hashimoto's thyroiditis  -     T4, Free, Direct Dialysis and T4, Total  -     TSH, 3rd generation    Cognitive developmental delay    Class 1 obesity due to excess calories without serious comorbidity with body mass index (BMI) of 31 0 to 31 9 in adult    Acne vulgaris  -     clindamycin-benzoyl peroxide (BENZACLIN) gel; Apply topically daily    Hirsutism  -     Testosterone; Future    Screening for lipid disorders  -     Comprehensive metabolic panel  -     Lipid Panel with Direct LDL reflex    Dermoid cyst of right ovary  -     US pelvis transabdominal only; Future    Encounter for annual health examination          Subjective:      Patient ID: Olga Coelho is a 30 y o  female  Pt here for routine check up    Patient is accompanied by her mother  Patient has past medical history significant for intellectual disability with cognitive delay and some decreased hearing  Patient also followed by Neurology for epilepsy  Patient has been stable on her medication with no seizures  Last visit June 2020  Mother reports annual follow-up and will be scheduled for June of this year  Mother is concerned about weight gain, is stable for 1 year  Patient does have some hair growth in pattern of hirsutism around her chin and sides of her face  Patient also has some mild to moderate facial acne less to chest wall  Mother reports her menstruation is regular once a month approximately 4 days not significantly heavy and rarely sees clots  There is some underlying possibility of polycystic ovarian syndrome given weight, hair growth pattern as well  Patient did have a CT scan of her abdomen in the past which did show a right ovarian dermoid cyst but no other abnormalities were noted in abdomen or pelvis  Will order updated ultrasound for further evaluation of poly cystic ovarian  Will check testosterone level as well  Mother is aware will need to get her follow-up specialty thyroid labs completed at [facility] only  Can also get her cholesterol and sugar levels checked at the same time  Mother does admit diet is not high in junk food or fast foods but she does eat a lot of rice and pasta  Also not drinking enough liquids maybe 1 or to bottles of water at the most   Does confirm that she has soda maybe once a month if that as that was discontinued last year  Discussed with mother after evaluation if no additional hormonal abnormalities would also then provide referral to nutritionist for additional assistance in healthy meaningful weight loss  Mother and patient confirm otherwise eating well and sleeping well  Bowel movements once daily        The following portions of the patient's history were reviewed and updated as appropriate: allergies, current medications, past family history, past medical history, past social history, past surgical history and problem list     Review of Systems   Constitutional: Negative  Negative for chills, fatigue, fever and unexpected weight change  HENT: Negative  Does have seasonal allergies   Respiratory: Negative  Negative for cough and shortness of breath  Cardiovascular: Negative  Gastrointestinal: Negative for abdominal pain, constipation and diarrhea  Endocrine: Negative for cold intolerance, heat intolerance, polydipsia, polyphagia and polyuria  Genitourinary: Negative for dysuria and menstrual problem  Musculoskeletal: Negative  Neurological: Positive for seizures  Negative for dizziness, light-headedness and headaches  Psychiatric/Behavioral: Negative  Objective:      /80 (BP Location: Left arm, Patient Position: Sitting, Cuff Size: Standard)   Pulse 84   Temp 98 4 °F (36 9 °C) (Temporal)   Ht 4' 9" (1 448 m)   Wt 65 4 kg (144 lb 3 2 oz)   SpO2 99%   BMI 31 20 kg/m²          Physical Exam  Vitals signs and nursing note reviewed  Constitutional:       General: She is not in acute distress  Appearance: She is obese  She is not ill-appearing  HENT:      Head: Normocephalic  Eyes:      Extraocular Movements: Extraocular movements intact  Conjunctiva/sclera: Conjunctivae normal    Neck:      Musculoskeletal: Neck supple  Cardiovascular:      Rate and Rhythm: Normal rate and regular rhythm  Heart sounds: Normal heart sounds  Pulmonary:      Effort: Pulmonary effort is normal       Breath sounds: Normal breath sounds  Abdominal:      General: Bowel sounds are normal  There is no distension  Palpations: Abdomen is soft  Tenderness: There is no abdominal tenderness  Musculoskeletal:      Right lower leg: No edema  Left lower leg: No edema  Neurological:      Mental Status: She is alert  Mental status is at baseline  Coordination: Coordination normal       Gait: Gait normal    Psychiatric:         Attention and Perception: Attention normal          Mood and Affect: Mood is not anxious           Behavior: Behavior normal       Comments: Innocent affect  Does participate in her care  Can answer questions appropriately

## 2021-03-26 ENCOUNTER — TELEPHONE (OUTPATIENT)
Dept: INTERNAL MEDICINE CLINIC | Facility: CLINIC | Age: 36
End: 2021-03-26

## 2021-03-26 NOTE — TELEPHONE ENCOUNTER
Received fax from pharmacy that Clindamycin Phos-Benzol Peroxide 1-5% Gel requires prior auth  According to insurance, the following are preferred agents:    Benzoyl Peroxide Cleanser 6% (OTC)  Benzoyl Peroxide Gel 2 5% (OTC)  Benzoyl Peroxide Gel 5% (OTC)  Benzoyl Peroxide Gel 10% (OTC)  Benzoyl Peroxide Lotion 5% (OTC)  Benzoyl Peroxide Lotion 10% (OTC)  Benzoyl Peroxide Wash 5% (OTC)  Benzoyl Peroxide Wash 10% (OTC)  Clindamycin 1% Gel  Clindamycin 1% Lotion  Clindamycin 1% Pledget  Clindamycin 1% Solution  Clindamycin-Benzoyl Peroxide 1 2%-5% Gel (generic Duac,   Neuac)  Differin 0 1% CreamAR  Differin 0 1% GelAR    Please order alternative or advise if prior auth is needed

## 2021-03-29 DIAGNOSIS — L70.0 ACNE VULGARIS: Primary | ICD-10-CM

## 2021-03-29 RX ORDER — CLINDAMYCIN PHOSPHATE AND BENZOYL PEROXIDE 10; 50 MG/G; MG/G
1 GEL TOPICAL DAILY
Qty: 45 G | Refills: 1 | Status: SHIPPED | OUTPATIENT
Start: 2021-03-29

## 2021-04-14 DIAGNOSIS — G40.909 NONINTRACTABLE EPILEPSY WITHOUT STATUS EPILEPTICUS, UNSPECIFIED EPILEPSY TYPE (HCC): ICD-10-CM

## 2021-04-14 DIAGNOSIS — E55.9 VITAMIN D INSUFFICIENCY: Primary | ICD-10-CM

## 2021-04-14 LAB
ALBUMIN SERPL-MCNC: 4.2 G/DL (ref 3.6–5.1)
ALBUMIN/GLOB SERPL: 1.4 (CALC) (ref 1–2.5)
ALP SERPL-CCNC: 114 U/L (ref 31–125)
ALT SERPL-CCNC: 16 U/L (ref 6–29)
AST SERPL-CCNC: 17 U/L (ref 10–30)
BILIRUB SERPL-MCNC: 0.3 MG/DL (ref 0.2–1.2)
BUN SERPL-MCNC: 8 MG/DL (ref 7–25)
BUN/CREAT SERPL: NORMAL (CALC) (ref 6–22)
CALCIUM SERPL-MCNC: 9.3 MG/DL (ref 8.6–10.2)
CHLORIDE SERPL-SCNC: 104 MMOL/L (ref 98–110)
CHOLEST SERPL-MCNC: 200 MG/DL
CHOLEST/HDLC SERPL: 3.6 (CALC)
CO2 SERPL-SCNC: 27 MMOL/L (ref 20–32)
CREAT SERPL-MCNC: 0.51 MG/DL (ref 0.5–1.1)
GLOBULIN SER CALC-MCNC: 2.9 G/DL (CALC) (ref 1.9–3.7)
GLUCOSE SERPL-MCNC: 83 MG/DL (ref 65–99)
HDLC SERPL-MCNC: 55 MG/DL
LDLC SERPL CALC-MCNC: 122 MG/DL (CALC)
NONHDLC SERPL-MCNC: 145 MG/DL (CALC)
POTASSIUM SERPL-SCNC: 4 MMOL/L (ref 3.5–5.3)
PROT SERPL-MCNC: 7.1 G/DL (ref 6.1–8.1)
SL AMB EGFR AFRICAN AMERICAN: 145 ML/MIN/1.73M2
SL AMB EGFR NON AFRICAN AMERICAN: 125 ML/MIN/1.73M2
SODIUM SERPL-SCNC: 138 MMOL/L (ref 135–146)
T4 FREE SERPL DIALY-MCNC: 1.4 NG/DL (ref 0.9–2.2)
T4 FREE SERPL-MCNC: 1 NG/DL (ref 0.8–1.8)
TESTOST SERPL-MCNC: 25 NG/DL (ref 2–45)
TRIGL SERPL-MCNC: 115 MG/DL
TSH SERPL-ACNC: 0.01 MIU/L

## 2021-04-14 RX ORDER — CARBAMAZEPINE 300 MG/1
300 CAPSULE, EXTENDED RELEASE ORAL 2 TIMES DAILY
Qty: 60 CAPSULE | Refills: 3 | Status: SHIPPED | OUTPATIENT
Start: 2021-04-14 | End: 2021-06-04 | Stop reason: SDUPTHER

## 2021-04-14 NOTE — TELEPHONE ENCOUNTER
Patient's mother calling to see if you would like the patient to get bw before her June appointment and for a refill of carbatrol  Please advise    Patient's mother requesting a call back

## 2021-04-15 DIAGNOSIS — E06.3 HYPOTHYROIDISM DUE TO HASHIMOTO'S THYROIDITIS: Primary | ICD-10-CM

## 2021-04-15 DIAGNOSIS — E03.8 HYPOTHYROIDISM DUE TO HASHIMOTO'S THYROIDITIS: Primary | ICD-10-CM

## 2021-04-15 RX ORDER — LEVOTHYROXINE SODIUM 0.07 MG/1
75 TABLET ORAL DAILY
Qty: 90 TABLET | Refills: 1 | Status: SHIPPED | OUTPATIENT
Start: 2021-04-15 | End: 2021-10-08

## 2021-04-16 ENCOUNTER — HOSPITAL ENCOUNTER (OUTPATIENT)
Dept: RADIOLOGY | Facility: HOSPITAL | Age: 36
Discharge: HOME | End: 2021-04-16
Payer: COMMERCIAL

## 2021-04-16 DIAGNOSIS — D27.0 DERMOID CYST OF RIGHT OVARY: ICD-10-CM

## 2021-04-16 PROCEDURE — 76856 US EXAM PELVIC COMPLETE: CPT

## 2021-04-19 ENCOUNTER — OFFICE VISIT (OUTPATIENT)
Dept: INTERNAL MEDICINE CLINIC | Facility: CLINIC | Age: 36
End: 2021-04-19

## 2021-04-19 VITALS
WEIGHT: 141.6 LBS | SYSTOLIC BLOOD PRESSURE: 102 MMHG | BODY MASS INDEX: 30.55 KG/M2 | OXYGEN SATURATION: 100 % | HEART RATE: 70 BPM | DIASTOLIC BLOOD PRESSURE: 65 MMHG | HEIGHT: 57 IN | TEMPERATURE: 98.8 F

## 2021-04-19 DIAGNOSIS — R42 LIGHTHEADEDNESS: ICD-10-CM

## 2021-04-19 DIAGNOSIS — E55.9 VITAMIN D INSUFFICIENCY: ICD-10-CM

## 2021-04-19 DIAGNOSIS — R11.0 NAUSEA: Primary | ICD-10-CM

## 2021-04-19 DIAGNOSIS — R74.8 ALKALINE PHOSPHATASE ELEVATION: ICD-10-CM

## 2021-04-19 PROCEDURE — 99213 OFFICE O/P EST LOW 20 MIN: CPT | Performed by: PHYSICIAN ASSISTANT

## 2021-04-19 RX ORDER — MECLIZINE HYDROCHLORIDE 25 MG/1
TABLET ORAL AS NEEDED
COMMUNITY
Start: 2021-04-17

## 2021-04-19 RX ORDER — GLUCOSAMINE/CHONDR SU A SOD 750-600 MG
2 TABLET ORAL DAILY
Qty: 60 CAPSULE | Refills: 5 | Status: SHIPPED | OUTPATIENT
Start: 2021-04-19 | End: 2021-06-04 | Stop reason: SDUPTHER

## 2021-04-19 NOTE — PROGRESS NOTES
Assessment/Plan: On the visit today accompanying your daughter we discussed the recent events around pelvic ultrasound  You report that your daughter was to have normal pelvic ultrasound that was ordered but that she had an IV placed and was put in the MRI  When staff realize she was to have an ultrasound only she was removed from the MRI and had the pelvic ultrasound completed  You do remain concerned as you are not sure what medication her daughter was given as you report that she developed nausea and lightheadedness after the procedure  We reviewed that you have already been in contact with the hospital regarding this event and will follow-up with them  You also took her daughter to the emergency room later that day once she developed the symptoms of nausea and lightheadedness  We did review that laboratory testing and physical exam was normal   The only exception was a minimally elevated alkaline phosphatase and new order has been provided to recheck this level  We did discussed based on location of imaging if contrast  was provided to her daughter she does not have an allergy to this but does have a reported adverse reaction that is similar to what occurred 2-3 years ago after 1st exposure  Reassurance that your daughter on her exam both today and in the emergency room is normal   No additional testing needs to be completed  We will contact you with the results of the follow-up blood test     No problem-specific Assessment & Plan notes found for this encounter  Diagnoses and all orders for this visit:    Nausea    Lightheadedness    Alkaline phosphatase elevation  -     Comprehensive metabolic panel; Future    Other orders  -     meclizine (ANTIVERT) 25 mg tablet          Subjective:      Patient ID: Kyle Hernandez is a 30 y o  female  Patient presents today for ER follow-up    History provided by mother as patient has intellectual disability but is able to accurately report some symptoms  Patient had presented to [facility]  emergency room on April 16th  Patient and mother report that she was in Outpatient Radiology for ultrasound of pelvis for ovarian cyst   Patient and mother report that she was given "saline" that was meant for a different patient who was to receive contrast   According to chart was given a saline flush and staff aware was not for contrast so IV removed  Mother believes something other than saline was given  Mother states daughter was prepared for MRI and was in the MRI for about 20 minutes and then staff came and stopped the MRI   Mother reports daughter developed dizziness and nausea started around 2-3 pm that day  Medication was administered right before 1:20 pm as mother states the test started around 1:20  Took daughter to ER around 5pm    Patient had labs completed at [facility] including CBC, CMP and coagulation profile  All of them within normal limits  Patient was discharged with meclizine for the dizziness  Mother reports     Mother reports when was given contrast dye 2-3 years ago developed low BP and was given medication and mother told no contrast dye  Mother confirms had similar symptoms when had the contrast 2-3 years ago  Mother states had diarrhea today and yesterday and normally not have but no pain  Also daughter reports still some lightheadedness today  No syncopal or presyncopal episode    Mother has already spoken with hospital administration regarding this event  She was on the phone with hospital when presenting for her visit and waited till she completed that phone call before discussing with her  Staff in the room for translation for clarity of discussion  Mother was also enquiring about a toxicology screen but advised that was not medically necessary    Reviewed with mother and patient that I am very sorry for this occurrence and based on her daughter's symptoms after radiology procedure and what happened years ago in the past with IV contrast being the same would suspect the only thing provided during the visit for the pelvic ultrasound was contrast dye  Reassured mother and patient that she does not have an allergy but an adverse reaction  Reassured patient and mother on examination as well as review of labs and evaluation at the emergency room no abnormalities  Patient is sitting quietly in no distress  Vital signs are normal and exam normal as well  Mother also enquiring about the slightly elevated alkaline phosphatase that was found on the blood test   Advise minimally increased and would recommend repeat order has been provided  The following portions of the patient's history were reviewed and updated as appropriate: allergies, current medications, past family history, past medical history, past social history, past surgical history and problem list     Review of Systems   Constitutional: Negative for chills, diaphoresis, fever and unexpected weight change  Eyes: Negative for visual disturbance  Respiratory: Negative  Negative for cough and shortness of breath  Cardiovascular: Negative for chest pain and palpitations  Gastrointestinal: Positive for diarrhea and nausea  Musculoskeletal: Negative  Skin: Negative  Negative for color change and rash  Neurological: Negative for dizziness, tremors, syncope and weakness  Psychiatric/Behavioral: Negative  Objective:      /65 (BP Location: Left arm, Patient Position: Sitting, Cuff Size: Standard)   Pulse 70   Temp 98 8 °F (37 1 °C) (Temporal)   Ht 4' 9" (1 448 m)   Wt 64 2 kg (141 lb 9 6 oz)   SpO2 100%   BMI 30 64 kg/m²          Physical Exam  Vitals signs and nursing note reviewed  Constitutional:       General: She is not in acute distress  Appearance: She is not ill-appearing, toxic-appearing or diaphoretic  HENT:      Head: Normocephalic  Eyes:      Extraocular Movements: Extraocular movements intact  Conjunctiva/sclera: Conjunctivae normal    Neck:      Musculoskeletal: Neck supple  Vascular: No carotid bruit  Cardiovascular:      Rate and Rhythm: Normal rate and regular rhythm  Heart sounds: Normal heart sounds  Pulmonary:      Effort: Pulmonary effort is normal       Breath sounds: Normal breath sounds  No wheezing  Abdominal:      General: Bowel sounds are normal       Tenderness: There is no abdominal tenderness  Musculoskeletal:      Right lower leg: No edema  Left lower leg: No edema  Skin:     Findings: No erythema or rash  Neurological:      Mental Status: She is alert  Mental status is at baseline  Psychiatric:      Comments: Behavior baseline and appropriate    Patient is able to participate in exam and following instructions for the exam

## 2021-04-21 NOTE — PATIENT INSTRUCTIONS
On the visit today accompanying your daughter we discussed the recent events around pelvic ultrasound  You report that your daughter was to have normal pelvic ultrasound that was ordered but that she had an IV placed and was put in the MRI  When staff realize she was to have an ultrasound only she was removed from the MRI and had the pelvic ultrasound completed  You do remain concerned as you are not sure what medication her daughter was given as you report that she developed nausea and lightheadedness after the procedure  We reviewed that you have already been in contact with the hospital regarding this event and will follow-up with them  You also took her daughter to the emergency room later that day once she developed the symptoms of nausea and lightheadedness  We did review that laboratory testing and physical exam was normal   The only exception was a minimally elevated alkaline phosphatase and new order has been provided to recheck this level  We did discussed based on location of imaging if contrast  was provided to her daughter she does not have an allergy to this but does have a reported adverse reaction that is similar to what occurred 2-3 years ago after 1st exposure  Reassurance that your daughter on her exam both today and in the emergency room is normal   No additional testing needs to be completed    We will contact you with the results of the follow-up blood test

## 2021-05-05 ENCOUNTER — OFFICE VISIT (OUTPATIENT)
Dept: OBGYN CLINIC | Facility: CLINIC | Age: 36
End: 2021-05-05

## 2021-05-05 VITALS
HEART RATE: 67 BPM | HEIGHT: 57 IN | SYSTOLIC BLOOD PRESSURE: 114 MMHG | BODY MASS INDEX: 30.63 KG/M2 | WEIGHT: 142 LBS | DIASTOLIC BLOOD PRESSURE: 82 MMHG

## 2021-05-05 DIAGNOSIS — E03.8 HYPOTHYROIDISM DUE TO HASHIMOTO'S THYROIDITIS: Primary | ICD-10-CM

## 2021-05-05 DIAGNOSIS — F81.9 COGNITIVE DEVELOPMENTAL DELAY: Primary | ICD-10-CM

## 2021-05-05 DIAGNOSIS — D36.9 DERMOID CYST: ICD-10-CM

## 2021-05-05 DIAGNOSIS — F80.4 SPEECH AND LANGUAGE DEVELOPMENTAL DELAY DUE TO HEARING LOSS: ICD-10-CM

## 2021-05-05 DIAGNOSIS — E06.3 HYPOTHYROIDISM DUE TO HASHIMOTO'S THYROIDITIS: Primary | ICD-10-CM

## 2021-05-05 PROCEDURE — 99385 PREV VISIT NEW AGE 18-39: CPT | Performed by: OBSTETRICS & GYNECOLOGY

## 2021-05-05 NOTE — PROGRESS NOTES
OB/GYN  ANNUAL VISIT  Nagi Hastings  2021  6:02 PM  Dr Rhonda Hogan MD     Subjective      Nagi Hastings is a 30 y o  female who presents for annual well woman exam  Periods are regular every 28 days, lasting 3 days  No intermenstrual bleeding, spotting, or discharge  Patient with Dx of cognitive development delay and epylepsy, she is currently in company of her mother  Patient with evidence of bilateral dermoid cyst in US performed in 21 in addition patient mother states intermenstrual bleeding in last couple of months  Due to those concerns her mother consulted today  GYN:  · Denies vaginal discharge, labial erythema or lesions, dyspareunia  · Menses are irregular, lasting 3-4 days  · Contraception:  Patient has never been sexual active, currently no using anycontraception  · Denies GYN surgeries     OB:  ·  female      :  · Denies dysuria, urinary frequency or urgency  · Denies hematuria, flank pain, incontinence  Breast:  · Patient states breast itching   · Denies breast mass, skin changes, dimpling, reddening, nipple retraction  · Denies breast discharge  · Patient does not have a family history of breast, endometrial, or ovarian ca  General:  · -Diet: regular  · Exercise: no  · Work: no  · ETOH use: no  · Tobacco use: no  · Recreational drug use: no    Screening:  · Cervical cancer: pap smear never performed       Review of Systems  Pertinent items are noted in HPI  Objective      /82   Pulse 67   Ht 4' 9" (1 448 m)   Wt 64 4 kg (142 lb)   LMP 2021 (Exact Date)   BMI 30 73 kg/m²     Physical Exam  Face: multiple acne scars at level of upper and lower cheeks  Cardio-pulmonar  Normal vesicular murmur, no rales,  nonlabored breathing , normal S1/S2 no cardiac murmurs , no gallops   Abdomen  Soft , no tender, no signs or peritoneal irritation   Extremities  mobiles no edemas    Pelvic examination  Deferred today    Patient mother states she is concern about possible trumatic experience for her daugher  She states her cognition is the one of a 6year old  Breast Examination  No breast masses, no nipple discharge or retraction, no skin changes, no axillar adenopathies  Assessment/Plan     Santi Sigala is a 30 y o  female who presents for annual well woman exam    · Patient without history of sexual activity, she has never have use contraception  · Breast examination WNL  There is no evidence of skin rashes or lesions on skin that correspond with skin itching that patient reports   · Reviewed with mother screening guidelines for pap smears  Pap cytology are every 3 years and Pap/HPV cotesting is due very 5 years independent of sexual life  At this time we have discussed with patient education to her daughter in regard of vaginal examination  We have discussed as well use of pediatric speculum if she desire further testing  Today pelvic exam has been deferred due to concerns from mother in regard of possible traumatic experience for patient  · Emphasized importance of annual pelvic and breast exams  Family history reviewed regarding genetically inherited cancers    A small bilateral dermoid cyst  · 2 7 cm in right ovary, 1 7 cm in left ovary  · Size of cystic formation at this time not concerning  · No change in size since CT performed in 2016  · We discussed patient symptoms of torsion and to consult if presence of significant  pelvic pain   · All questions have been answered to her satisfaction       Patient evaluated with Dr Gabe Cunningham MD

## 2021-05-11 ENCOUNTER — IMMUNIZATIONS (OUTPATIENT)
Dept: FAMILY MEDICINE CLINIC | Facility: HOSPITAL | Age: 36
End: 2021-05-11

## 2021-05-11 ENCOUNTER — APPOINTMENT (OUTPATIENT)
Dept: LAB | Facility: HOSPITAL | Age: 36
End: 2021-05-11
Payer: COMMERCIAL

## 2021-05-11 DIAGNOSIS — G40.909 NONINTRACTABLE EPILEPSY WITHOUT STATUS EPILEPTICUS, UNSPECIFIED EPILEPSY TYPE (HCC): ICD-10-CM

## 2021-05-11 DIAGNOSIS — Z23 ENCOUNTER FOR IMMUNIZATION: Primary | ICD-10-CM

## 2021-05-11 DIAGNOSIS — E55.9 VITAMIN D INSUFFICIENCY: ICD-10-CM

## 2021-05-11 DIAGNOSIS — E06.3 HYPOTHYROIDISM DUE TO HASHIMOTO'S THYROIDITIS: Primary | ICD-10-CM

## 2021-05-11 DIAGNOSIS — E03.8 HYPOTHYROIDISM DUE TO HASHIMOTO'S THYROIDITIS: Primary | ICD-10-CM

## 2021-05-11 DIAGNOSIS — L68.0 HIRSUTISM: ICD-10-CM

## 2021-05-11 DIAGNOSIS — R74.8 ALKALINE PHOSPHATASE ELEVATION: ICD-10-CM

## 2021-05-11 LAB
25(OH)D3 SERPL-MCNC: 28.4 NG/ML (ref 30–100)
ALBUMIN SERPL BCP-MCNC: 3.8 G/DL (ref 3.5–5)
ALP SERPL-CCNC: 129 U/L (ref 46–116)
ALT SERPL W P-5'-P-CCNC: 24 U/L (ref 12–78)
ANION GAP SERPL CALCULATED.3IONS-SCNC: 5 MMOL/L (ref 4–13)
AST SERPL W P-5'-P-CCNC: 15 U/L (ref 5–45)
BILIRUB SERPL-MCNC: 0.26 MG/DL (ref 0.2–1)
BUN SERPL-MCNC: 9 MG/DL (ref 5–25)
CALCIUM SERPL-MCNC: 9.1 MG/DL (ref 8.3–10.1)
CHLORIDE SERPL-SCNC: 108 MMOL/L (ref 100–108)
CHOLEST SERPL-MCNC: 234 MG/DL (ref 50–200)
CO2 SERPL-SCNC: 27 MMOL/L (ref 21–32)
CREAT SERPL-MCNC: 0.66 MG/DL (ref 0.6–1.3)
GFR SERPL CREATININE-BSD FRML MDRD: 116 ML/MIN/1.73SQ M
GLUCOSE SERPL-MCNC: 94 MG/DL (ref 65–140)
HDLC SERPL-MCNC: 64 MG/DL
LDLC SERPL CALC-MCNC: 140 MG/DL (ref 0–100)
POTASSIUM SERPL-SCNC: 3.6 MMOL/L (ref 3.5–5.3)
PROT SERPL-MCNC: 7.7 G/DL (ref 6.4–8.2)
SODIUM SERPL-SCNC: 140 MMOL/L (ref 136–145)
T3 SERPL-MCNC: 1.2 NG/ML (ref 0.6–1.8)
T4 FREE SERPL-MCNC: 0.8 NG/DL (ref 0.76–1.46)
TESTOST SERPL-MCNC: 25 NG/DL
TRIGL SERPL-MCNC: 152 MG/DL
TSH SERPL DL<=0.05 MIU/L-ACNC: 0.01 UIU/ML (ref 0.36–3.74)

## 2021-05-11 PROCEDURE — 84439 ASSAY OF FREE THYROXINE: CPT | Performed by: PHYSICIAN ASSISTANT

## 2021-05-11 PROCEDURE — 84480 ASSAY TRIIODOTHYRONINE (T3): CPT | Performed by: PHYSICIAN ASSISTANT

## 2021-05-11 PROCEDURE — 80157 ASSAY CARBAMAZEPINE FREE: CPT

## 2021-05-11 PROCEDURE — 84443 ASSAY THYROID STIM HORMONE: CPT | Performed by: PHYSICIAN ASSISTANT

## 2021-05-11 PROCEDURE — 84403 ASSAY OF TOTAL TESTOSTERONE: CPT

## 2021-05-11 PROCEDURE — 0001A SARS-COV-2 / COVID-19 MRNA VACCINE (PFIZER-BIONTECH) 30 MCG: CPT

## 2021-05-11 PROCEDURE — 36415 COLL VENOUS BLD VENIPUNCTURE: CPT | Performed by: PHYSICIAN ASSISTANT

## 2021-05-11 PROCEDURE — 80053 COMPREHEN METABOLIC PANEL: CPT | Performed by: PHYSICIAN ASSISTANT

## 2021-05-11 PROCEDURE — 82306 VITAMIN D 25 HYDROXY: CPT

## 2021-05-11 PROCEDURE — 80061 LIPID PANEL: CPT | Performed by: PHYSICIAN ASSISTANT

## 2021-05-11 PROCEDURE — 91300 SARS-COV-2 / COVID-19 MRNA VACCINE (PFIZER-BIONTECH) 30 MCG: CPT

## 2021-05-13 ENCOUNTER — TELEPHONE (OUTPATIENT)
Dept: NEUROLOGY | Facility: CLINIC | Age: 36
End: 2021-05-13

## 2021-05-13 DIAGNOSIS — E03.8 HYPOTHYROIDISM DUE TO HASHIMOTO'S THYROIDITIS: ICD-10-CM

## 2021-05-13 DIAGNOSIS — E06.3 HYPOTHYROIDISM DUE TO HASHIMOTO'S THYROIDITIS: ICD-10-CM

## 2021-05-13 LAB — CARBAMAZEPINE FREE SERPL-MCNC: 1.2 UG/ML (ref 0.6–4.2)

## 2021-05-13 RX ORDER — LEVOTHYROXINE SODIUM 0.07 MG/1
75 TABLET ORAL DAILY
Qty: 90 TABLET | Refills: 1 | Status: CANCELLED | OUTPATIENT
Start: 2021-05-13 | End: 2021-11-09

## 2021-05-13 NOTE — TELEPHONE ENCOUNTER
Telephone call to patient and informed her of her Carbamazapine results  I informed patient  that Dr Kingston Wayne will address low vitamin D level at upcoming visit  Patient to continue with vitamin D supplementation  Patient understood instructions

## 2021-05-13 NOTE — TELEPHONE ENCOUNTER
Name of medication, dose, quantity and frequency      Requested Prescriptions     Pending Prescriptions Disp Refills    levothyroxine 75 mcg tablet 90 tablet 1     Sig: Take 1 tablet (75 mcg total) by mouth daily       Number of refills left: 0    Amount of medication left: 0    Pharmacy verified and updated     [id number][[id number]] [place], [address]

## 2021-05-13 NOTE — TELEPHONE ENCOUNTER
----- Message from Shannan Vogel MD sent at 5/13/2021  1:13 PM EDT -----  Good carbamazepine level  Will address low vitamin D level at upcoming visit  Continue with vitamin D supplementation

## 2021-05-14 ENCOUNTER — TELEPHONE (OUTPATIENT)
Dept: NEUROLOGY | Facility: CLINIC | Age: 36
End: 2021-05-14

## 2021-05-14 NOTE — TELEPHONE ENCOUNTER
Called and spoke to patient  Patient confirmed upcoming apt with Dr Ana Vogel on 6/4/21 @ 3:30 pm  Patient has no issues or concerns at this time

## 2021-05-25 DIAGNOSIS — E03.8 HYPOTHYROIDISM DUE TO HASHIMOTO'S THYROIDITIS: Primary | ICD-10-CM

## 2021-05-25 DIAGNOSIS — E06.3 HYPOTHYROIDISM DUE TO HASHIMOTO'S THYROIDITIS: Primary | ICD-10-CM

## 2021-05-25 DIAGNOSIS — E78.00 PURE HYPERCHOLESTEROLEMIA: ICD-10-CM

## 2021-05-25 DIAGNOSIS — R74.8 ELEVATED ALKALINE PHOSPHATASE LEVEL: ICD-10-CM

## 2021-06-01 ENCOUNTER — IMMUNIZATIONS (OUTPATIENT)
Dept: FAMILY MEDICINE CLINIC | Facility: HOSPITAL | Age: 36
End: 2021-06-01

## 2021-06-01 DIAGNOSIS — Z23 ENCOUNTER FOR IMMUNIZATION: Primary | ICD-10-CM

## 2021-06-01 PROCEDURE — 0002A SARS-COV-2 / COVID-19 MRNA VACCINE (PFIZER-BIONTECH) 30 MCG: CPT

## 2021-06-01 PROCEDURE — 91300 SARS-COV-2 / COVID-19 MRNA VACCINE (PFIZER-BIONTECH) 30 MCG: CPT

## 2021-06-04 ENCOUNTER — OFFICE VISIT (OUTPATIENT)
Dept: NEUROLOGY | Facility: CLINIC | Age: 36
End: 2021-06-04
Payer: COMMERCIAL

## 2021-06-04 VITALS
HEIGHT: 57 IN | HEART RATE: 60 BPM | WEIGHT: 143 LBS | DIASTOLIC BLOOD PRESSURE: 80 MMHG | BODY MASS INDEX: 30.85 KG/M2 | SYSTOLIC BLOOD PRESSURE: 130 MMHG

## 2021-06-04 DIAGNOSIS — M83.5 ANTICONVULSANT DRUG-INDUCED BONE SOFTENING: ICD-10-CM

## 2021-06-04 DIAGNOSIS — G43.109 VERTIGINOUS MIGRAINE: ICD-10-CM

## 2021-06-04 DIAGNOSIS — E55.9 VITAMIN D INSUFFICIENCY: ICD-10-CM

## 2021-06-04 DIAGNOSIS — G40.909 NONINTRACTABLE EPILEPSY WITHOUT STATUS EPILEPTICUS, UNSPECIFIED EPILEPSY TYPE (HCC): Primary | ICD-10-CM

## 2021-06-04 DIAGNOSIS — G43.809 HEADACHE, VARIANT MIGRAINE: ICD-10-CM

## 2021-06-04 DIAGNOSIS — T42.75XA ANTICONVULSANT DRUG-INDUCED BONE SOFTENING: ICD-10-CM

## 2021-06-04 PROCEDURE — 3008F BODY MASS INDEX DOCD: CPT | Performed by: PSYCHIATRY & NEUROLOGY

## 2021-06-04 PROCEDURE — 1036F TOBACCO NON-USER: CPT | Performed by: PSYCHIATRY & NEUROLOGY

## 2021-06-04 PROCEDURE — 99215 OFFICE O/P EST HI 40 MIN: CPT | Performed by: PSYCHIATRY & NEUROLOGY

## 2021-06-04 RX ORDER — GLUCOSAMINE/CHONDR SU A SOD 750-600 MG
2 TABLET ORAL DAILY
Qty: 60 CAPSULE | Refills: 11 | Status: SHIPPED | OUTPATIENT
Start: 2021-06-04 | End: 2022-06-07

## 2021-06-04 RX ORDER — FOLIC ACID 1 MG/1
1000 TABLET ORAL DAILY
Qty: 30 TABLET | Refills: 11 | Status: SHIPPED | OUTPATIENT
Start: 2021-06-04 | End: 2022-05-08

## 2021-06-04 RX ORDER — CARBAMAZEPINE 300 MG/1
300 CAPSULE, EXTENDED RELEASE ORAL 2 TIMES DAILY
Qty: 60 CAPSULE | Refills: 11 | Status: SHIPPED | OUTPATIENT
Start: 2021-06-04 | End: 2022-05-12 | Stop reason: SDUPTHER

## 2021-06-04 RX ORDER — TOPIRAMATE 50 MG/1
50 TABLET, FILM COATED ORAL
Qty: 30 TABLET | Refills: 11 | Status: SHIPPED | OUTPATIENT
Start: 2021-06-04 | End: 2022-05-12

## 2021-06-04 NOTE — PATIENT INSTRUCTIONS
Plan:   1 - continue with Brand Name Carbatrol 300mg cap, take 1 cap twice a day  2 - continue with Vitamin D 2000 units tab, take 2 tabs daily (encourage dietary supplementation rich in vitamin D and/or sun exposure)  3 - continue with folic acid 1mg daily  4 - with increase in headaches (both dizziness and headaches are likely the same, vertiginous migraines)  5 - take topiramate 50mg at bedtime  6 - may continue to use ibuprofen 200mg tab take 2 or 3 tabs as needed for migraine or dizziness attack, no more than 2 doses in 1 day  7 - drink more water at least three 500mL bottles of water, try to avoid artifical flavorings    8 - follow-up in 4 months  9 - keep a headache calendar to monitor the frequency of migraine headaches

## 2021-06-04 NOTE — PROGRESS NOTES
[facility]  Patient's Name: Carlos Arrieta   Patient's : 1985   Visit Type: follow-up  Referring MD / PCP:  Rachele Harris PA-C     Assessment:  Ms Carlos Arrieta is a 30 y  o  woman with intellectual disability and focal epilepsy (last EEG study found left centrotemporal epileptiform discharges, but no recurrent seizure for nearly 20 years)  Patient tolerates Carbatrol (Brand name) and remains seizure-free  Ms Daksha Johnston needs brand name Carbatrol because generic carbamazepine was causing her abdominal cramps  She also has migraine and vertiginous migraines  Through the last few years, there is variable reporting of migraine headaches  She sometimes will report dizziness to her mother but it seems that her migraines and dizziness have similar features (mostly appearing and feeling tired and sleepy)  Her migraines and vertiginous migraines seem to be more frequent than a year ago  During this visit, we discussed using topiramate daily as a preventative medication than an abortive option  More importantly, Du Murry has to be encouraged to drink more water and avoid sugary drinks  With Carbatrol, she is at risk for AED induced low vitamin D levels; she should continue with vitamin D supplementation  Plan:   1 - continue with Brand Name Carbatrol 300mg cap, take 1 cap twice a day  2 - continue with Vitamin D 2000 units tab, take 2 tabs daily (encourage dietary supplementation rich in vitamin D and/or sun exposure)  3 - continue with folic acid 1mg daily  4 - with increase in headaches (both dizziness and headaches are likely the same, vertiginous migraines)  5 - take topiramate 50mg at bedtime  6 - may continue to use ibuprofen 200mg tab take 2 or 3 tabs as needed for migraine or dizziness attack, no more than 2 doses in 1 day  7 - drink more water at least three 500mL bottles of water, try to avoid artifical flavorings    8 - follow-up in 4 months  9 - keep a headache calendar to monitor the frequency of migraine headaches    Problem List Items Addressed This Visit        Cardiovascular and Mediastinum    Headache, variant migraine    Relevant Medications    topiramate (TOPAMAX) 50 MG tablet    Carbatrol 300 MG 12 hr capsule       Nervous and Auditory    Epilepsy (HCC) - Primary    Relevant Medications    topiramate (TOPAMAX) 50 MG tablet    Carbatrol 300 MG 12 hr capsule    folic acid (FOLVITE) 1 mg tablet       Musculoskeletal and Integument    Anticonvulsant drug-induced bone softening       Other    Vitamin D insufficiency    Relevant Medications    Cholecalciferol (RA Vitamin D-3) 50 MCG (2000 UT) CAPS    Vertiginous migraine          Chief Complaint:    Chief Complaint   Patient presents with    Seizures    Headache      HPI:    Carlos Arrieta is a 30 y o  right handed female here for follow-up evaluation of epilepsy in the setting of intellectual disability   [id number] ([place])  Interval History 6/4/2021  There have been no recurrent seizure or loss of consciousness  However, her mother is worried about headaches and dizziness  Mireilie complains of dizziness (rare) but last episode of dizziness was 3 days ago, but it lasted all day  She told her mother that she was dizzy, and report it again  There was no difficulty with walking, looking like she was going to fall  There are no other accompanying features to dizziness  With specific symptom probing she may look more tired or out of it than usual   Sometimes there is a headache with the dizziness  With the last dizziness, there was more sleepiness, headache, nausea and vomiting, and feeling that she was going to fall  She gets these episodes of dizziness about once or twice a month  She has headaches mostly around her periods, so the headaches can last for 2-3 days of the period    The headache is most associated with feeling dizzy, pain is in the front of the head and the sides, she is not able to describe the pain  Headaches can last a couple of hours, usually treated with Advil or as needed topiramate  Headaches are about once a week and mostly she is sleepy  Her mother has to force her to drink water, she only drinks a half or two bottles of water  There is no problem with sleep  AED/side effects/compliance:  Carbatrol (brand) 300mg twice a day  No side effects  Mother regulates her medication    Seizure semiology:  1  Fatigue then generalized convulsion    Woman of childbearing age with Epilepsy:  Contraception: not sexually active  Folic acid supplement:  No    Prior Epilepsy History:  Intake history 9/25/2014  She is here today with her caretaker and mother is on the phone  The caretaker translate for the mother  Her first seizure occurred when she was 4years old  She was playing with her brother, then she hit her head on a wall, her scalp was lacerated, there was a white thin fabric that came out (unknown substance)  She was observed in a hospital in [place]   Three months later she had her first convulsion  Her mother described a convulsion, foaming at the mouth, this occurred without warning  After the first convulsion, she would have a convulsion weakly  Afterwards, Sudeep Martinez would express fatigue before she has a generalized convulsion  She was initially tried on Tegretol Syrup, did not work, then Luminal was added  She then moved from [place] the [place] and she was transitioned to [employer]  Currently, she may be switched between carbamazepine and Carbatrol  No problem with the generic formulation  She complains of feeling sleepy and tired, maybe the left eye is crossed  Her last reported seizure was in July 1997  Summary of [id number]  During this period there has been no reported seizure  However, we attempted to transition her off of carbamazepine to minimize the risk of osteopenia and other side effects   We started to levetiracetam and then moved on to lamotrigine  With these medications, her mother was concerned about weight gain, excessive sleeping, hair loss, constant itching, moodiness, poor coordination, and painful menses  In 11/2015, she started to complain about headaches and lightheadedness  had a 24 hours ambulatory EEG that was normal (events of headaches and dizziness are not associated with epileptiform activity)  But a subsequent EEG off of lamotrigine; showed left centrotemporal spike-slow wave discharges  We then transitioned her back to carbamazepine; but, on generic formulation, there was abdominal cramps  She was prescribed brand name Carbatrol at 300mg twice a day which resulted in a 360 degree change in her behavior  However, her mother started to worry about frontal headaches  Summary of [id number]  Transitioned to Carbatrol brand name 300mg twice a day  There has been a 360 degree change in Mireilies behavior  She is no longer having a rash, her hair is not falling out, no complaints of abdominal pain, menses is more regular, and she is no longer feeling depressed  Her mother feels that she is back to her usual self  May 2017, there was an increase in headache frequency (from once a week to nearly every day)  Neil Vaughn has a difficult time describing her headaches; she would typically call out to her mother [facility]) and report a headache, these can be severe, frontal headaches, sometimes associated with dizziness, nausea, and not wanting to eat  Headaches can be electrical or pulsing in quality  Mostly occurring at night; more frequently as her sinuses gets worse from her allergies (If she does not have sinusitis then the headache is once a week)  She would get two Tylenol tabs then she goes to sleep  Dustin may also complain of feeling vertiginous associated with or independent of the headaches  She does not drink water at all because she does not like it    Dustin prefers juice and sodas (mostly Pepsi, Coca-Cola, and little juice boxes) but not every day  By 2017, she started topiramate and increased water consumption, which help decrease the headache frequency  By  follow-up, headaches were infrequent to report, she did not take topiramate regularly, mostly as needed to abort a headache  May 2018, Mother notices some abnormal eye movements when she takes a picture of Dustin or when Mora Bedoya is talking to her  Dustin's right eye sometimes wanders (or drifts) to the right side, just does it on its own  Her mother notices that the eyes just wanders just a little bit to the side for a few seconds, then it looks normal, this happens every day  There is no double vision  She showed me one picture that she claims the eye drifts to the right side (like she is always looking to the right) but both eyes were conjugate and looking to the right  Dustin complains of pain in the right eye  There is no associated motor movement, arm dystonia, or jerking activity  There is no period of altered awareness or staring spell associated with these eye drifting episodes  She is still taking vitamin D 4000 units a day  Special Features  Status epilepticus: No  Self Injury Seizures: No  Precipitating Factors: None    Epilepsy Risk Factors:  Abnormal pregnancy: No  Abnormal birth/: Full term but small for gestational age (4 5lbs)  Abnormal Development: Walking at [age] years    Speech did not develop until [age] years, attention deficit, she needed speech therapy  Febrile seizures, simple: No  Febrile seizures, complex: No  CNS infection: No  Mental retardation: Learning disability  Cerebral palsy: No  Head injury (moderate/severe): Yes  CNS neoplasm: No  CNS malformation: No  Neurosurgical procedure: No  Stroke: No  Alcohol abuse: No  Drug abuse: No  Family history Sz/epilepsy: No    Prior AEDs:  Luminal  Tegretol Syrup  Carbamazepine/Carbatrol (transitioned off to minimize the risk of osteoporosis)  Levetiracetam (weight gain, irritability)  Lamotrigine (depression, irritability)    Prior workup:  x   Imaging:  MRI brain 3/24/2008  [id number] T2 hyperintense focus within the subcortical white matter of the lateral aspect of the left midfrontal lobe (may be seen in migraine headaches)    5/25/2017 - MRI brain  No intracranial pathology  Mild left anterior ethmoid and maxillary sinus disease    EEGs:  3/21/2008 Dr Kathryn Armas  6-7 Hz PDR  Bifrontal sharp and spike activity  Generalized spike-and-wave activity with frontal predominance    11/4-5/2015 24 hours ambulatory EEG  Normal 24 hours ambulatory EEG  Patient event button of dizziness (twice) shows no change to the awake background EEG      07/08/2016 >1 hour EEG  Rare presence of focal spike-slow waves over the left central-temporal region      Labs:  Component      Latest Ref Rng & Units 5/11/2021   Sodium      136 - 145 mmol/L 140   Potassium      3 5 - 5 3 mmol/L 3 6   Chloride      100 - 108 mmol/L 108   CO2      21 - 32 mmol/L 27   Anion Gap      4 - 13 mmol/L 5   BUN      5 - 25 mg/dL 9   Creatinine      0 60 - 1 30 mg/dL 0 66   Glucose, Random      65 - 140 mg/dL 94   Calcium      8 3 - 10 1 mg/dL 9 1   AST      5 - 45 U/L 15   ALT      12 - 78 U/L 24   Alkaline Phosphatase      46 - 116 U/L 129 (H)   Total Protein      6 4 - 8 2 g/dL 7 7   Albumin      3 5 - 5 0 g/dL 3 8   TOTAL BILIRUBIN      0 20 - 1 00 mg/dL 0 26   eGFR      ml/min/1 73sq m 116   TSH 3RD GENERATON      0 358 - 3 740 uIU/mL 0 011 (L)   Carbamazepine, Free      0 6 - 4 2 ug/mL 1 2   Vit D, 25-Hydroxy      30 0 - 100 0 ng/mL 28 4 (L)   Free T4      0 76 - 1 46 ng/dL 0 80   T3, Total      0 60 - 1 80 ng/mL 1 20     Component       Alkaline Phosphatase   Latest Ref Rng & Units       46 - 116 U/L   4/5/2018       113   5/17/2019       125 (H)   5/28/2020       108   4/8/2021       114   5/11/2021       129 (H)     General exam   Blood pressure 130/80, pulse 60, height 4' 9" (1 448 m), weight 64 9 kg (143 lb)    Appearance: normally developed, appears well  Carotids: not assessed  Cardiovascular: regular rate and rhythm and normal heart sounds  Pulmonary: clear to auscultation    HEENT: anicteric and moist mucus membranes / oral cavity   Fundoscopy: not assessed    Mental status  Orientation: alert and oriented to name, unable to give date or location  Fund of Knowledge: limited   Attention and Concentration: not assessed  Current and Remote Memory:not assessed  Language: she follows commands by gestures and with English instructions and spontaneous speech is normal    Cranial Nerves  CN 1: not tested  CN 2: pupils equal round reactive to direct and consenual light   CN 3, 4, 6: EOMI, no nystagmus  CN 5:not assessed  CN 7:not assessed  CN 8:not assessed  CN 9, 10:no dysarthria present  CN 11:symmetric strength of sternocleidomastoid and trapezius muscles  CN 12:not assessed    Motor:  Bulk, Tone: normal bulk, normal tone  Pronation: not assessed  Strength: Symmetric strength of the arms and legs, no lateralizing weakness   Abnormal movements: no abnormal movements are present    Sensory:  Lighttouch: intact in all limbs  Romberg:not assessed    Coordination:  FNF:FNF bilaterally intact  Gait/Station:normal gait and normal tandem gait    Reflexes:  reflexes are uniformly hyporeflexic    Past Medical/Surgical History:  Patient Active Problem List   Diagnosis    Allergic rhinitis    Anticonvulsant drug-induced bone softening    Cognitive developmental delay    Epilepsy ([address] )    Headache, variant migraine    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    Vitamin D insufficiency    Weight gain    Class 1 obesity due to excess calories without serious comorbidity with body mass index (BMI) of 31 0 to 31 9 in adult    Hypothyroidism due to Hashimoto's thyroiditis    Acne vulgaris    Vertiginous migraine     Past Medical History:   Diagnosis Date    Learning disability     Mental capacity of an 7year old      Past Surgical History:   Procedure Laterality Date    NO PAST SURGERIES         Past Psychiatric History:  Depression: No  Anxiety: No  Psychosis: No    Medications:    Current Outpatient Medications:     Carbatrol 300 MG 12 hr capsule, Take 1 capsule (300 mg total) by mouth 2 (two) times a day, Disp: 60 capsule, Rfl: 11    Cholecalciferol (RA Vitamin D-3) 50 MCG (2000 UT) CAPS, Take 2 capsules (4,000 Units total) by mouth daily, Disp: 60 capsule, Rfl: 11    Clindamycin Phos-Benzoyl Perox (Duac) gel, Apply 1 application topically daily, Disp: 45 g, Rfl: 1    fluticasone (FLONASE) 50 mcg/act nasal spray, 2 sprays into each nostril daily, Disp: 16 g, Rfl: 1    folic acid (FOLVITE) 1 mg tablet, Take 1 tablet (1,000 mcg total) by mouth daily, Disp: 30 tablet, Rfl: 11    hydrocortisone 1 % cream, Apply topically 3 (three) times a day for 7 days, Disp: 30 g, Rfl: 0    levothyroxine 75 mcg tablet, Take 1 tablet (75 mcg total) by mouth daily, Disp: 90 tablet, Rfl: 1    loratadine (CLARITIN) 10 mg tablet, take 1 tablet by mouth once daily, Disp: 90 tablet, Rfl: 1    meclizine (ANTIVERT) 25 mg tablet, , Disp: , Rfl:     multivitamin (THERAGRAN) TABS, Take 1 tablet by mouth daily, Disp: , Rfl:     topiramate (TOPAMAX) 50 MG tablet, Take 1 tablet (50 mg total) by mouth daily at bedtime, Disp: 30 tablet, Rfl: 11    Allergies: Allergies   Allergen Reactions    Contrast [Iodinated Diagnostic Agents]     Dust Mite Extract     Nickel     Penicillins     Pollen Extract     Short Ragweed Pollen Ext        Family history:  Family History   Problem Relation Age of Onset   [employer] Migraines Mother     No Known Problems Father      There is no family history of seizure, epilepsy or developmental delay  Social History  Living situation:  Lives with mother  Work:  No  Driving:  No   reports that she has never smoked   She has never used smokeless tobacco  She reports that she does not drink alcohol or use drugs  Review of Systems  A review of at least 12 organ/systems was obtained by the medical assistant and reviewed by me, including additional positives/negatives:  Neurological: Positive for seizures and headaches  Negative for syncope  Patient mother stated long time for seizure  Patient mother stated when she gets headache her eyes go outwards  The total amount of time spent with the patient along with pre-chart and post-chart preparation was 51 minutes on the calendar day of the date of service  This included history taking, physical exam, review of ancillary testing, counseling provided to the patient regarding diagnosis, medications, treatment, and risk management, and other communication to the patient's providers and/or family    Start time: 3:40PM  End time: 4:31PM

## 2021-06-04 NOTE — PROGRESS NOTES
Review of Systems   Constitutional: Negative for chills and fever  HENT: Negative for ear pain and sore throat  Eyes: Negative for pain and visual disturbance  Respiratory: Negative for cough and shortness of breath  Cardiovascular: Negative for chest pain and palpitations  Gastrointestinal: Negative for abdominal pain and vomiting  Genitourinary: Negative for dysuria and hematuria  Musculoskeletal: Negative for arthralgias and back pain  Skin: Negative for color change and rash  Neurological: Positive for seizures and headaches  Negative for syncope  Patient mother stated long time for seizure  Patient mother stated when she gets headache her eyes go outwards  All other systems reviewed and are negative

## 2021-07-13 DIAGNOSIS — J30.89 CHRONIC NONSEASONAL ALLERGIC RHINITIS DUE TO POLLEN: ICD-10-CM

## 2021-07-13 RX ORDER — LORATADINE 10 MG/1
TABLET ORAL
Qty: 90 TABLET | Refills: 1 | Status: SHIPPED | OUTPATIENT
Start: 2021-07-13 | End: 2022-01-12

## 2021-07-21 ENCOUNTER — OFFICE VISIT (OUTPATIENT)
Dept: GASTROENTEROLOGY | Facility: CLINIC | Age: 36
End: 2021-07-21
Payer: COMMERCIAL

## 2021-07-21 VITALS
SYSTOLIC BLOOD PRESSURE: 108 MMHG | DIASTOLIC BLOOD PRESSURE: 71 MMHG | WEIGHT: 138 LBS | TEMPERATURE: 98.7 F | HEIGHT: 59 IN | BODY MASS INDEX: 27.82 KG/M2

## 2021-07-21 DIAGNOSIS — R74.8 ELEVATED ALKALINE PHOSPHATASE LEVEL: ICD-10-CM

## 2021-07-21 PROCEDURE — 3008F BODY MASS INDEX DOCD: CPT | Performed by: INTERNAL MEDICINE

## 2021-07-21 PROCEDURE — 99243 OFF/OP CNSLTJ NEW/EST LOW 30: CPT | Performed by: INTERNAL MEDICINE

## 2021-07-21 PROCEDURE — 1036F TOBACCO NON-USER: CPT | Performed by: INTERNAL MEDICINE

## 2021-07-21 NOTE — PROGRESS NOTES
[facility] - Outpatient Note  Stephanie Cornell 30 y o  female MRN: [id number]  Encounter: [id number]      ASSESSMENT AND PLAN:    Stephanie Cornell is a 30 y o  old female with PMH of hypothyroidism, seizure disorder, intellectual disability who presents for elevated alkaline phosphatase    Elevated alkaline phosphatase - very mildly elevated  Suspect bone turnover in the setting of thyroid dysfunction and anticonvulsants  Otherwise no signs of liver dysfunction  No abdominal pain  · No further GI workup  · Consider checking AMA if alkaline phosphatase raises above 2-3 times upper limit of normal  · Follow-up p r n       1  Elevated alkaline phosphatase level  - Ambulatory referral to Gastroenterology    ______________________________________________________________________    SUBJECTIVE:  Stephanie Cornell is a 30 y o  old female with PMH of hypothyroidism, seizure disorder, intellectual disability who presents for elevated alkaline phosphatase  Patient had outpatient labs with CMP showed elevated alkaline phosphatase and was referred to our clinic  Otherwise denies abdominal pain nausea vomiting constipation diarrhea  Mother was in room given much of history   was used  No previous EGD or colonoscopy  No significant NSAID use or blood thinners  No significant alcohol or smoking history  REVIEW OF SYSTEMS IS OTHERWISE NEGATIVE        Historical Information   Past Medical History:   Diagnosis Date    Learning disability     Mental capacity of an 7year old      Past Surgical History:   Procedure Laterality Date    NO PAST SURGERIES       Social History   Social History     Substance and Sexual Activity   Alcohol Use Never     Social History     Substance and Sexual Activity   Drug Use Never     Social History     Tobacco Use   Smoking Status Never Smoker   Smokeless Tobacco Never Used     Family History   Problem Relation Age of Onset    Migraines Mother     No Known Problems Father        Meds/Allergies       Current Outpatient Medications:     Carbatrol 300 MG 12 hr capsule    Cholecalciferol (RA Vitamin D-3) 50 MCG (2000 UT) CAPS    Clindamycin Phos-Benzoyl Perox (Duac) gel    fluticasone (FLONASE) 50 mcg/act nasal spray    folic acid (FOLVITE) 1 mg tablet    levothyroxine 75 mcg tablet    loratadine (CLARITIN) 10 mg tablet    meclizine (ANTIVERT) 25 mg tablet    multivitamin (THERAGRAN) TABS    topiramate (TOPAMAX) 50 MG tablet    hydrocortisone 1 % cream    Allergies   Allergen Reactions    Contrast [Iodinated Diagnostic Agents]     Dust Mite Extract     Nickel     Penicillins     Pollen Extract     Short Ragweed Pollen Ext            Objective     Blood pressure 108/71, temperature 98 7 °F (37 1 °C), temperature source Tympanic, height 4' 11" (1 499 m), weight 62 6 kg (138 lb)  Body mass index is 27 87 kg/m²  PHYSICAL EXAM:      General Appearance:   Alert, cooperative, no distress   HEENT:   Normocephalic, atraumatic, anicteric  Neck:  Supple, symmetrical, trachea midline   Lungs:   Clear to auscultation bilaterally; no rales, rhonchi or wheezing; respirations unlabored    Heart[de-identified]   Regular rate and rhythm; no murmur, rub, or gallop  Abdomen:   Soft, non-tender, non-distended; normal bowel sounds; no masses, no organomegaly    Genitalia:   Deferred    Rectal:   Deferred    Extremities:  No cyanosis, clubbing or edema    Pulses:  2+ and symmetric    Skin:  No jaundice, rashes, or lesions    Lymph nodes:  No palpable cervical lymphadenopathy        Lab Results:   No visits with results within 1 Day(s) from this visit  Latest known visit with results is:   Orders Only on 05/11/2021   Component Date Value    Free T4 05/11/2021 0 80     T3, Total 05/11/2021 1 20          Radiology Results:   No results found

## 2021-09-21 LAB
T4 FREE SERPL DIALY-MCNC: 1 NG/DL (ref 0.9–2.2)
T4 SERPL-MCNC: 4.6 MCG/DL (ref 4.8–10.4)
TSH SERPL-ACNC: 0.06 MIU/L

## 2021-10-06 ENCOUNTER — CONSULT (OUTPATIENT)
Dept: MULTI SPECIALTY CLINIC | Facility: CLINIC | Age: 36
End: 2021-10-06

## 2021-10-06 VITALS
HEART RATE: 73 BPM | DIASTOLIC BLOOD PRESSURE: 70 MMHG | OXYGEN SATURATION: 99 % | HEIGHT: 57 IN | WEIGHT: 135 LBS | BODY MASS INDEX: 29.12 KG/M2 | SYSTOLIC BLOOD PRESSURE: 104 MMHG | TEMPERATURE: 98.4 F

## 2021-10-06 DIAGNOSIS — E06.3 HYPOTHYROIDISM DUE TO HASHIMOTO'S THYROIDITIS: ICD-10-CM

## 2021-10-06 DIAGNOSIS — E03.8 HYPOTHYROIDISM DUE TO HASHIMOTO'S THYROIDITIS: ICD-10-CM

## 2021-10-06 DIAGNOSIS — E03.8 CENTRAL HYPOTHYROIDISM: Primary | ICD-10-CM

## 2021-10-06 PROCEDURE — 99243 OFF/OP CNSLTJ NEW/EST LOW 30: CPT | Performed by: PHYSICIAN ASSISTANT

## 2021-10-08 ENCOUNTER — OFFICE VISIT (OUTPATIENT)
Dept: NEUROLOGY | Facility: CLINIC | Age: 36
End: 2021-10-08
Payer: COMMERCIAL

## 2021-10-08 VITALS
HEIGHT: 57 IN | SYSTOLIC BLOOD PRESSURE: 122 MMHG | DIASTOLIC BLOOD PRESSURE: 70 MMHG | WEIGHT: 135 LBS | HEART RATE: 78 BPM | BODY MASS INDEX: 29.12 KG/M2 | TEMPERATURE: 97.8 F

## 2021-10-08 DIAGNOSIS — F81.9 COGNITIVE DEVELOPMENTAL DELAY: ICD-10-CM

## 2021-10-08 DIAGNOSIS — G40.109 FOCAL EPILEPSY (HCC): ICD-10-CM

## 2021-10-08 DIAGNOSIS — E06.3 HYPOTHYROIDISM DUE TO HASHIMOTO'S THYROIDITIS: ICD-10-CM

## 2021-10-08 DIAGNOSIS — G43.109 VERTIGINOUS MIGRAINE: ICD-10-CM

## 2021-10-08 DIAGNOSIS — E03.8 HYPOTHYROIDISM DUE TO HASHIMOTO'S THYROIDITIS: ICD-10-CM

## 2021-10-08 DIAGNOSIS — G40.802 OTHER EPILEPSY WITHOUT STATUS EPILEPTICUS, NOT INTRACTABLE (HCC): Primary | ICD-10-CM

## 2021-10-08 PROCEDURE — 99213 OFFICE O/P EST LOW 20 MIN: CPT | Performed by: NURSE PRACTITIONER

## 2021-10-08 RX ORDER — LEVOTHYROXINE SODIUM 0.07 MG/1
TABLET ORAL
Qty: 90 TABLET | Refills: 1 | Status: SHIPPED | OUTPATIENT
Start: 2021-10-08 | End: 2022-04-04

## 2021-10-19 ENCOUNTER — OFFICE VISIT (OUTPATIENT)
Dept: INTERNAL MEDICINE CLINIC | Facility: CLINIC | Age: 36
End: 2021-10-19

## 2021-10-19 VITALS
DIASTOLIC BLOOD PRESSURE: 71 MMHG | TEMPERATURE: 98.9 F | BODY MASS INDEX: 28.91 KG/M2 | HEIGHT: 57 IN | OXYGEN SATURATION: 99 % | SYSTOLIC BLOOD PRESSURE: 104 MMHG | HEART RATE: 82 BPM | WEIGHT: 134 LBS

## 2021-10-19 DIAGNOSIS — E03.8 HYPOTHYROIDISM DUE TO HASHIMOTO'S THYROIDITIS: ICD-10-CM

## 2021-10-19 DIAGNOSIS — E06.3 HYPOTHYROIDISM DUE TO HASHIMOTO'S THYROIDITIS: ICD-10-CM

## 2021-10-19 DIAGNOSIS — R10.13 DYSPEPSIA: ICD-10-CM

## 2021-10-19 DIAGNOSIS — F81.9 COGNITIVE DEVELOPMENTAL DELAY: ICD-10-CM

## 2021-10-19 DIAGNOSIS — R19.6 BAD BREATH: Primary | ICD-10-CM

## 2021-10-19 PROCEDURE — 99213 OFFICE O/P EST LOW 20 MIN: CPT | Performed by: PHYSICIAN ASSISTANT

## 2021-10-19 RX ORDER — OMEPRAZOLE 20 MG/1
20 CAPSULE, DELAYED RELEASE ORAL DAILY
Qty: 90 CAPSULE | Refills: 0 | Status: SHIPPED | OUTPATIENT
Start: 2021-10-19 | End: 2022-01-17

## 2021-10-25 ENCOUNTER — APPOINTMENT (OUTPATIENT)
Dept: LAB | Facility: HOSPITAL | Age: 36
End: 2021-10-25
Payer: COMMERCIAL

## 2021-10-25 DIAGNOSIS — R10.13 DYSPEPSIA: ICD-10-CM

## 2021-10-25 PROCEDURE — 87338 HPYLORI STOOL AG IA: CPT

## 2021-10-26 LAB — H PYLORI AG STL QL IA: NEGATIVE

## 2022-02-14 ENCOUNTER — OFFICE VISIT (OUTPATIENT)
Dept: DENTISTRY | Facility: CLINIC | Age: 37
End: 2022-02-14

## 2022-02-14 VITALS — HEART RATE: 77 BPM | DIASTOLIC BLOOD PRESSURE: 78 MMHG | TEMPERATURE: 97.7 F | SYSTOLIC BLOOD PRESSURE: 114 MMHG

## 2022-02-14 DIAGNOSIS — Z01.21 ENCOUNTER FOR DENTAL EXAMINATION AND CLEANING WITH ABNORMAL FINDINGS: Primary | ICD-10-CM

## 2022-02-14 PROCEDURE — D0210 INTRAORAL - COMPLETE SERIES OF RADIOGRAPHIC IMAGES: HCPCS | Performed by: DENTIST

## 2022-02-14 PROCEDURE — D0150 COMPREHENSIVE ORAL EVALUATION - NEW OR ESTABLISHED PATIENT: HCPCS | Performed by: DENTIST

## 2022-02-14 NOTE — PROGRESS NOTES
32yo F presents with mother for a comprehensive exam  Verbal consent for treatment given in addition to the forms  Reviewed health history - Patient is ASA III (epilepsy, intellectual disability, Hashimoto's)   Consents signed: Yes     CC: I need a check up and cleaning  Perio: generalized accumulation of plaque  Caries Assessment:  Radiographs: [facility]     Oral Hygiene instruction reviewed and given  Hygiene recall visits recommended to the patient  Findings:     Treatment Plan:  1  Infection control: N/A       2  Periodontal therapy:   Spot probing performed - pt has generalized gingivitis and      3  Caries control:   Several areas of decay noted - see odontogram       Prognosis is Good    Referrals needed: No    NV: prophy with hygiene  NNV: begin resins (quadrant dentistry)

## 2022-02-16 ENCOUNTER — OFFICE VISIT (OUTPATIENT)
Dept: DENTISTRY | Facility: CLINIC | Age: 37
End: 2022-02-16

## 2022-02-16 VITALS — DIASTOLIC BLOOD PRESSURE: 69 MMHG | SYSTOLIC BLOOD PRESSURE: 108 MMHG | TEMPERATURE: 98 F | HEART RATE: 80 BPM

## 2022-02-16 DIAGNOSIS — Z01.21 ENCOUNTER FOR DENTAL EXAMINATION AND CLEANING WITH ABNORMAL FINDINGS: Primary | ICD-10-CM

## 2022-02-16 PROBLEM — K02.9 DECAY, TEETH: Status: ACTIVE | Noted: 2022-02-16

## 2022-02-16 PROCEDURE — D1110 PROPHYLAXIS - ADULT: HCPCS

## 2022-02-16 NOTE — PROGRESS NOTES
Prophy    Dental procedures in this visit     - PROPHYLAXIS - ADULT (Completed)     Service provider: Edyta Ferrera [address] [facility]     Billing provider: FALGUNI Quintana was present during dental visit today  Pts CC= "My teetyh on the upper right are cold sensitive"    Method Used:  · Prophy Method Used: Hand Scaling  · Polished  · Flossed    Radiographs Taken:  · None    Intra/Extra Oral Cancer Screening:  · Within normal limits      Oral Hygiene:  · Fair    Plaque:  · Generalized  · Moderate    Calculus:  · Localized  · Moderate  · Lower anteriors    Bleeding:  · Bleeding on probing: No periodontal exam for this visit  · Localized  · Moderate    Stain:  · Localized  · Light    Periodontal Charting:  · Spot probing    Periodontal Classification:  · Localized  · Moderate  · Gingivitis      Nutritional Counseling:  · Discussed pH and the role it plays in decay    OHI: Rec trying Prev 5000+    Kim Lasluc    No orders of the defined types were placed in this encounter        NV:Max right side restorations   NV2:6 mth Prophy

## 2022-03-10 ENCOUNTER — OFFICE VISIT (OUTPATIENT)
Dept: INTERNAL MEDICINE CLINIC | Facility: CLINIC | Age: 37
End: 2022-03-10

## 2022-03-10 VITALS
SYSTOLIC BLOOD PRESSURE: 113 MMHG | WEIGHT: 136 LBS | OXYGEN SATURATION: 98 % | TEMPERATURE: 98.4 F | BODY MASS INDEX: 29.43 KG/M2 | DIASTOLIC BLOOD PRESSURE: 75 MMHG | HEART RATE: 81 BPM

## 2022-03-10 DIAGNOSIS — G40.109 FOCAL EPILEPSY (HCC): ICD-10-CM

## 2022-03-10 DIAGNOSIS — E03.9 HYPOTHYROIDISM, UNSPECIFIED TYPE: ICD-10-CM

## 2022-03-10 DIAGNOSIS — F81.9 COGNITIVE DEVELOPMENTAL DELAY: Primary | ICD-10-CM

## 2022-03-10 PROCEDURE — 99213 OFFICE O/P EST LOW 20 MIN: CPT | Performed by: INTERNAL MEDICINE

## 2022-03-10 NOTE — PROGRESS NOTES
ASSESSMENT/PLAN:    Case and plan discussed with Dr Elsy Chaudhry     Diagnoses and all orders for this visit:    Cognitive developmental delay  · Stable  · Follows up with Neurology  Focal epilepsy ([address] )  · Seizure free since 1997  · Control with carbamazepine 300 mg BID  · Follows up with Neurology  Hypothyroidism, unspecified type  · Currently on levothyroxine 75 mcg  · Follows up with endocrinology  · Repeat TSH and T4 pending  To get blood work done before next endocrinology visit next month  Health Maintenance:  · Lipid panel, CMP, TSH and T4 pending  · Mother refused Pap smear because patient is not sexually active  · Will make an appointment for annual physical         Immunization History   Administered Date(s) Administered    COVID-19 PFIZER VACCINE 0 3 ML IM 05/11/2021, 06/01/2021    INFLUENZA 09/25/2014, 11/10/2015    Influenza Quadrivalent, 6-35 Months IM 11/15/2016    Influenza, seasonal, injectable 09/25/2014    Tdap 11/15/2016         HISTORY OF PRESENT ILLNESS:    Mrs Monalisa Cobb is a 61-year-old with medical history of cognitive developmental delay, central hypothyroidism, epilepsy, allergic rhinitis presenting for a follow up of chronic conditions  Patient is accompanied by her mother today  Patient has been doing well with no complaints  She was seen by endocrinology on October 2021 for hypothyroidism follow up  T4 was normal at 1 0 at that time  Patient was advised to continue current dose of levothyroxine  Patient has not had hypothyroid symptoms including cold intolerance, fatigue, weight gain, constipation  She was also seen by neurology on October 2021 to discuss epilepsy, cognitive developmental delay and vertiginous migraine  She has been seizure free for 30 years  Currently taking Carbamazepine 300mg daily  Her functional status has been stable with no recent changes  Patient is taking Topamax 50mg daily for vertiginous migraines prophylaxis    Her mom reports that headaches usually occur with patient has her menstrual periods and is not associated with nausea, vomiting, photophobia, neck pain  She would like to talk to Neurology about discontinuing Topamax because she thinks headaches are not due to migraines  No other complaints today  Review of Systems   Constitutional: Negative for chills and fever  HENT: Negative for ear pain and sore throat  Eyes: Negative for pain and visual disturbance  Respiratory: Negative for cough and shortness of breath  Cardiovascular: Negative for chest pain and palpitations  Gastrointestinal: Negative for abdominal pain and vomiting  Genitourinary: Negative for dysuria and hematuria  Musculoskeletal: Negative for arthralgias and back pain  Skin: Negative for color change and rash  Neurological: Negative for seizures and syncope  All other systems reviewed and are negative  OBJECTIVE:  Vitals:    03/10/22 1015   BP: 113/75   BP Location: Right arm   Patient Position: Sitting   Cuff Size: Standard   Pulse: 81   Temp: 98 4 °F (36 9 °C)   TempSrc: Temporal   SpO2: 98%   Weight: 61 7 kg (136 lb)       Physical Exam  Vitals and nursing note reviewed  Constitutional:       General: She is not in acute distress  Appearance: She is well-developed  HENT:      Head: Normocephalic and atraumatic  Eyes:      Conjunctiva/sclera: Conjunctivae normal    Cardiovascular:      Rate and Rhythm: Normal rate and regular rhythm  Heart sounds: No murmur heard  Pulmonary:      Effort: Pulmonary effort is normal  No respiratory distress  Breath sounds: Normal breath sounds  Abdominal:      Palpations: Abdomen is soft  Tenderness: There is no abdominal tenderness  Musculoskeletal:      Cervical back: Neck supple  Skin:     General: Skin is warm and dry  Neurological:      Mental Status: She is alert  Mental status is at baseline     Psychiatric:         Mood and Affect: Mood normal          Behavior: Behavior normal             Current Outpatient Medications:     Carbatrol 300 MG 12 hr capsule, Take 1 capsule (300 mg total) by mouth 2 (two) times a day, Disp: 60 capsule, Rfl: 11    Cholecalciferol (RA Vitamin D-3) 50 MCG (2000 UT) CAPS, Take 2 capsules (4,000 Units total) by mouth daily, Disp: 60 capsule, Rfl: 11    Clindamycin Phos-Benzoyl Perox (Duac) gel, Apply 1 application topically daily, Disp: 45 g, Rfl: 1    fluticasone (FLONASE) 50 mcg/act nasal spray, 2 sprays into each nostril daily, Disp: 16 g, Rfl: 1    folic acid (FOLVITE) 1 mg tablet, Take 1 tablet (1,000 mcg total) by mouth daily, Disp: 30 tablet, Rfl: 11    hydrocortisone 1 % cream, Apply topically 3 (three) times a day for 7 days, Disp: 30 g, Rfl: 0    levothyroxine 75 mcg tablet, take 1 tablet by mouth once daily, Disp: 90 tablet, Rfl: 1    loratadine (CLARITIN) 10 mg tablet, take 1 tablet by mouth once daily, Disp: 90 tablet, Rfl: 3    meclizine (ANTIVERT) 25 mg tablet, if needed  , Disp: , Rfl:     multivitamin (THERAGRAN) TABS, Take 1 tablet by mouth daily, Disp: , Rfl:     omeprazole (PriLOSEC) 20 mg delayed release capsule, take 1 capsule by mouth once daily, Disp: 90 capsule, Rfl: 3    topiramate (TOPAMAX) 50 MG tablet, Take 1 tablet (50 mg total) by mouth daily at bedtime, Disp: 30 tablet, Rfl: 11    Past Medical History:   Diagnosis Date    Learning disability     Mental capacity of an 7year old      Past Surgical History:   Procedure Laterality Date    NO PAST SURGERIES       Family History   Problem Relation Age of Onset    Migraines Mother     No Known Problems Father      Social History     Socioeconomic History    Marital status: Single     Spouse name: Not on file    Number of children: 0    Years of education: Not on file    Highest education level: Not on file   Occupational History    Not on file   Tobacco Use    Smoking status: Never Smoker    Smokeless tobacco: Never Used   Vaping Use    Vaping Use: Never used   Substance and Sexual Activity    Alcohol use: Never    Drug use: Never    Sexual activity: Never   Other Topics Concern    Not on file   Social History Narrative    Caffeine use    Mental disability but able to perform unskilled work    Caregiver: nurses aide    Sedentary lifestyle     Social Determinants of Health     Financial Resource Strain: Medium Risk    Difficulty of Paying Living Expenses: Somewhat hard   Food Insecurity: No Food Insecurity    Worried About [address] in the Last Year: Never true   [address] in the Last Year: Never true   Transportation Needs: No Transportation Needs    Lack of Transportation (Medical): No    Lack of Transportation (Non-Medical): No   Physical Activity: Not on file   Stress: Not on file   Social Connections: Not on file   Intimate Partner Violence: Not on file   Housing Stability: Low Risk     Unable to Pay for Housing in the Last Year: No    Number of Places Lived in the Last Year: 1    Unstable Housing in the Last Year: No     Social History     Tobacco Use   Smoking Status Never Smoker   Smokeless Tobacco Never Used     Social History     Substance and Sexual Activity   Alcohol Use Never     Substance and Sexual Activity   Alcohol Use Never        Substance and Sexual Activity   Drug Use Never         Deandra Aldridge MD  Internal Medicine Residency, PGY-1  [address]   [address] [facility]  [address] [facility],[address]  [place], [address] [facility]

## 2022-04-04 DIAGNOSIS — E06.3 HYPOTHYROIDISM DUE TO HASHIMOTO'S THYROIDITIS: ICD-10-CM

## 2022-04-04 DIAGNOSIS — E03.8 HYPOTHYROIDISM DUE TO HASHIMOTO'S THYROIDITIS: ICD-10-CM

## 2022-04-04 RX ORDER — LEVOTHYROXINE SODIUM 0.07 MG/1
TABLET ORAL
Qty: 90 TABLET | Refills: 1 | Status: SHIPPED | OUTPATIENT
Start: 2022-04-04

## 2022-04-05 ENCOUNTER — OFFICE VISIT (OUTPATIENT)
Dept: DENTISTRY | Facility: CLINIC | Age: 37
End: 2022-04-05

## 2022-04-05 VITALS — DIASTOLIC BLOOD PRESSURE: 68 MMHG | TEMPERATURE: 98.7 F | SYSTOLIC BLOOD PRESSURE: 101 MMHG | HEART RATE: 56 BPM

## 2022-04-05 DIAGNOSIS — Z01.21 ENCOUNTER FOR DENTAL EXAMINATION AND CLEANING WITH ABNORMAL FINDINGS: Primary | ICD-10-CM

## 2022-04-05 PROCEDURE — D0140 LIMITED ORAL EVALUATION - PROBLEM FOCUSED: HCPCS | Performed by: DENTIST

## 2022-04-05 NOTE — PROGRESS NOTES
Patient came with her mother for resins on teeth 2 and 3 occlusal    But patient was complaining of pain on lower right  She pointed to tooth #31  Tooth #31 was clinically evaluated for crack and caries  There was no cavity, fracture or crack on the tooth  PA was taken to confirm  Patient's gingiva was swollen around teeth 30 and 31   Dr Varghese Roles evaluated the teeth and agreed that the pain could be due to plaque around the teeth  Teeth were cleaned using hand   Patient was given Chlorhexidine rinse  Patient and mother were told that she will feel sensitive in that area for another day or so because we scaled around that area  Tylenol can be taken if patient is in pain  They understood and patient left in stable condition       Nv: resins per her treatment plan

## 2022-04-07 ENCOUNTER — OFFICE VISIT (OUTPATIENT)
Dept: DENTISTRY | Facility: CLINIC | Age: 37
End: 2022-04-07

## 2022-04-07 DIAGNOSIS — K02.9 DENTAL DECAY: Primary | ICD-10-CM

## 2022-04-07 PROCEDURE — D2391 RESIN-BASED COMPOSITE - 1 SURFACE, POSTERIOR: HCPCS | Performed by: DENTIST

## 2022-04-08 NOTE — PROGRESS NOTES
Composite Filling    Duy Laz presents for #3 O composite filling  PMH reviewed, no changes  Applied topical benzocaine, administered 1 carps 2% lido 1:100k epi via infiltration  Prepped tooth #3 O with 245 carbide on high speed  Caries removed with round carbide on slow speed  Isolation with cotton rolls and dri-angles    Etch with 37% H2PO4, rinse, dry  Applied Adhese with 20 second scrub once, gentle air dry and light cured for 10s  Restored with Tetric bulk monisha shade A2 and light cured  Refined with finishing burs, polished with enhance point  Verified occlusion and contacts  Pt left satisfied      NV: Continue with restorations

## 2022-04-12 ENCOUNTER — OFFICE VISIT (OUTPATIENT)
Dept: MULTI SPECIALTY CLINIC | Facility: CLINIC | Age: 37
End: 2022-04-12

## 2022-04-12 ENCOUNTER — TELEPHONE (OUTPATIENT)
Dept: MULTI SPECIALTY CLINIC | Facility: CLINIC | Age: 37
End: 2022-04-12

## 2022-04-12 VITALS
HEIGHT: 57 IN | TEMPERATURE: 98.1 F | BODY MASS INDEX: 29.56 KG/M2 | WEIGHT: 137 LBS | HEART RATE: 80 BPM | SYSTOLIC BLOOD PRESSURE: 111 MMHG | DIASTOLIC BLOOD PRESSURE: 74 MMHG

## 2022-04-12 DIAGNOSIS — E03.8 HYPOTHYROIDISM DUE TO HASHIMOTO'S THYROIDITIS: Primary | ICD-10-CM

## 2022-04-12 DIAGNOSIS — E55.9 VITAMIN D INSUFFICIENCY: ICD-10-CM

## 2022-04-12 DIAGNOSIS — E06.3 HYPOTHYROIDISM DUE TO HASHIMOTO'S THYROIDITIS: Primary | ICD-10-CM

## 2022-04-12 LAB
ALBUMIN SERPL-MCNC: 4.5 G/DL (ref 3.6–5.1)
ALBUMIN/GLOB SERPL: 1.5 (CALC) (ref 1–2.5)
ALP SERPL-CCNC: 131 U/L (ref 31–125)
ALT SERPL-CCNC: 12 U/L (ref 6–29)
AST SERPL-CCNC: 16 U/L (ref 10–30)
BILIRUB SERPL-MCNC: 0.4 MG/DL (ref 0.2–1.2)
BUN SERPL-MCNC: 13 MG/DL (ref 7–25)
BUN/CREAT SERPL: ABNORMAL (CALC) (ref 6–22)
CALCIUM SERPL-MCNC: 9.6 MG/DL (ref 8.6–10.2)
CHLORIDE SERPL-SCNC: 107 MMOL/L (ref 98–110)
CHOLEST SERPL-MCNC: 249 MG/DL
CHOLEST/HDLC SERPL: 3.5 (CALC)
CO2 SERPL-SCNC: 23 MMOL/L (ref 20–32)
CREAT SERPL-MCNC: 0.59 MG/DL (ref 0.5–1.1)
GLOBULIN SER CALC-MCNC: 3 G/DL (CALC) (ref 1.9–3.7)
GLUCOSE SERPL-MCNC: 80 MG/DL (ref 65–99)
HDLC SERPL-MCNC: 71 MG/DL
LDLC SERPL CALC-MCNC: 161 MG/DL (CALC)
NONHDLC SERPL-MCNC: 178 MG/DL (CALC)
POTASSIUM SERPL-SCNC: 4.4 MMOL/L (ref 3.5–5.3)
PROT SERPL-MCNC: 7.5 G/DL (ref 6.1–8.1)
SL AMB EGFR AFRICAN AMERICAN: 138 ML/MIN/1.73M2
SL AMB EGFR NON AFRICAN AMERICAN: 119 ML/MIN/1.73M2
SODIUM SERPL-SCNC: 140 MMOL/L (ref 135–146)
T4 FREE SERPL DIALY-MCNC: 1 NG/DL (ref 0.9–2.2)
T4 FREE SERPL-MCNC: 0.9 NG/DL (ref 0.8–1.8)
TRIGL SERPL-MCNC: 70 MG/DL
TSH SERPL-ACNC: 0.2 MIU/L

## 2022-04-12 PROCEDURE — 99213 OFFICE O/P EST LOW 20 MIN: CPT | Performed by: PHYSICIAN ASSISTANT

## 2022-04-12 NOTE — PROGRESS NOTES
Patient Progress Note    CC: hypothyroidism   [employer]: [id number]    Referring Provider  Radha Mack Pa-c  No address on file     History of Present Illness:     Patient is a 29year old female here for evaluation of hypothyroidism  She has a PMH of developmental delay, epilepsy, migraines  She has had hypothyroidism since 2020  Mother notes that she brought the patient to the PCP for weight gain, which is what prompted the thyroid test  Her TSH and free T4 have been low in the past which is suggestive of central hypothyroidism  She has had an MRI in 2017 which did not show significant abnormalities  She is currently on levothyroxine 75 mcg 1 tablet daily  Patient is taking it 1 hr before breakfast on an empty stomach and at least 4 hrs apart from supplements  Tolerating medication well  No history of external radiation to head/neck/chest  No family history of thyroid cancer  No recent Iodine loading in form of medication, biotin or kelp supplements or radiological diagnostic studies  She does have a history of a fall which resulted in a head injury at age 4  Per mother seizures started occurring after that fall  Her menstrual cycles are normal   Most recent TSH is low at 0 20 and free T4 is normal at 0 90  Vitamin D deficiency: patient is taking vitamin D3 4000 IU daily  Vitamin D was low at 28 4 in May 2021  Patient Active Problem List   Diagnosis    Allergic rhinitis    Anticonvulsant drug-induced bone softening    Cognitive developmental delay    Epilepsy ([address] )    Headache, variant migraine    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    Vitamin D insufficiency    Overweight (BMI 25 0-29  9)    Weight gain    Hypothyroidism due to Hashimoto's thyroiditis    Acne vulgaris    Vertiginous migraine    Decay, teeth     Past Medical History:   Diagnosis Date    Learning disability     Mental capacity of an 7year old       Past Surgical History:   Procedure Laterality Date    NO PAST SURGERIES        Family History   Problem Relation Age of Onset   [place] Migraines Mother     No Known Problems Father      Social History     Tobacco Use    Smoking status: Never Smoker    Smokeless tobacco: Never Used   Substance Use Topics    Alcohol use: Never     Allergies   Allergen Reactions    Contrast [Iodinated Diagnostic Agents]     Dust Mite Extract     Nickel     Penicillins     Pollen Extract     Short Ragweed Pollen Ext      Current Outpatient Medications   Medication Sig Dispense Refill    Carbatrol 300 MG 12 hr capsule Take 1 capsule (300 mg total) by mouth 2 (two) times a day 60 capsule 11    Cholecalciferol (RA Vitamin D-3) 50 MCG (2000 UT) CAPS Take 2 capsules (4,000 Units total) by mouth daily 60 capsule 11    fluticasone (FLONASE) 50 mcg/act nasal spray 2 sprays into each nostril daily 16 g 1    folic acid (FOLVITE) 1 mg tablet Take 1 tablet (1,000 mcg total) by mouth daily 30 tablet 11    hydrocortisone 1 % cream Apply topically 3 (three) times a day for 7 days 30 g 0    levothyroxine 75 mcg tablet take 1 tablet by mouth once daily 90 tablet 1    loratadine (CLARITIN) 10 mg tablet take 1 tablet by mouth once daily 90 tablet 3    meclizine (ANTIVERT) 25 mg tablet if needed        multivitamin (THERAGRAN) TABS Take 1 tablet by mouth daily      omeprazole (PriLOSEC) 20 mg delayed release capsule take 1 capsule by mouth once daily 90 capsule 3    topiramate (TOPAMAX) 50 MG tablet Take 1 tablet (50 mg total) by mouth daily at bedtime 30 tablet 11    Clindamycin Phos-Benzoyl Perox (Duac) gel Apply 1 application topically daily (Patient not taking: Reported on 4/12/2022 ) 45 g 1     No current facility-administered medications for this visit  Review of Systems   Constitutional: Positive for fatigue  Negative for activity change, appetite change and unexpected weight change  HENT: Negative for trouble swallowing  Eyes: Negative for visual disturbance  Respiratory: Negative for shortness of breath  Cardiovascular: Negative for chest pain and palpitations  Gastrointestinal: Negative for constipation and diarrhea  Endocrine: Negative for cold intolerance and heat intolerance  Musculoskeletal: Negative  Skin: Negative  Neurological: Negative for tremors  Psychiatric/Behavioral: Negative  Physical Exam:  Body mass index is 29 65 kg/m²  /74 (BP Location: Left arm, Patient Position: Sitting, Cuff Size: Adult)   Pulse 80   Temp 98 1 °F (36 7 °C) (Temporal)   Ht 4' 9" (1 448 m)   Wt 62 1 kg (137 lb)   BMI 29 65 kg/m²    Wt Readings from Last 3 Encounters:   04/12/22 62 1 kg (137 lb)   03/10/22 61 7 kg (136 lb)   10/19/21 60 8 kg (134 lb)       Physical Exam  Vitals and nursing note reviewed  Constitutional:       Appearance: She is well-developed  HENT:      Head: Normocephalic  Eyes:      General: No scleral icterus  Pupils: Pupils are equal, round, and reactive to light  Neck:      Thyroid: No thyromegaly  Cardiovascular:      Rate and Rhythm: Normal rate and regular rhythm  Pulses:           Radial pulses are 2+ on the right side and 2+ on the left side  Heart sounds: No murmur heard  Pulmonary:      Effort: Pulmonary effort is normal  No respiratory distress  Breath sounds: Normal breath sounds  No wheezing  Musculoskeletal:      Cervical back: Neck supple  Skin:     General: Skin is warm and dry  Neurological:      Mental Status: She is alert  Deep Tendon Reflexes: Reflexes are normal and symmetric  Patient's shoes and socks were not removed            Labs:   No results found for: HGBA1C    Lab Results   Component Value Date    HDL 71 04/07/2022    TRIG 70 04/07/2022       Lab Results   Component Value Date    GLUCOSE 85 01/07/2016    CALCIUM 9 6 04/07/2022     01/19/2017    K 4 4 04/07/2022    CO2 23 04/07/2022     04/07/2022    BUN 13 04/07/2022    CREATININE 0 59 04/07/2022        eGFR   Date Value Ref Range Status   05/11/2021 116 ml/min/1 73sq m Final       Lab Results   Component Value Date    ALT 12 04/07/2022    AST 16 04/07/2022    ALKPHOS 131 (H) 04/07/2022    BILITOT 0 3 01/19/2017       Lab Results   Component Value Date    PUW3XSBPDCLR 0 011 (L) 05/11/2021    TSH 0 20 (L) 04/07/2022    F3DMUEQ 1 20 05/11/2021    A3LESHB 4 6 (L) 09/10/2021         Plan:  Nadege Sarmiento was seen today for follow-up  Diagnoses and all orders for this visit:    Hypothyroidism due to Hashimoto's thyroiditis  Most recent TSH is low at 0 20 and free T4 is normal at 0 90  Continue current treatment   Monitor labs   -     T4, free; Future  -     TSH, 3rd generation; Future    Vitamin D insufficiency  Vitamin D previously low at 28 4  Continue current supplement  Check level  -     Vitamin D 25 hydroxy; Future        Discussed with the patient and all questions fully answered  She will call me if any problems arise      Counseled patient on diagnostic results, prognosis, risk and benefit of treatment options, instruction for management, importance of treatment compliance, risk  factor reduction and impressions    Ginette Church PA-C      [facility] ENDOCRINOLOG[facility]

## 2022-04-12 NOTE — TELEPHONE ENCOUNTER
Patient called   Was seen by Dr Everton Herrera 22 at 1:30pm   Patient was asked if there was anyone in her family who had thyroid cancer  Patient states she did not know how to answer that  Went home and called family members and was told that last year her aunt, her mother's sister,  from thyroid cancer which mestasized into her lungs after she had thyroid surgery  Please update the chart to reflect this information

## 2022-04-13 NOTE — TELEPHONE ENCOUNTER
Noted  This can be updated in her family history section of her chart  No changes in plan / treatment at this time

## 2022-05-07 DIAGNOSIS — G40.909 NONINTRACTABLE EPILEPSY WITHOUT STATUS EPILEPTICUS, UNSPECIFIED EPILEPSY TYPE (HCC): ICD-10-CM

## 2022-05-08 RX ORDER — FOLIC ACID 1 MG/1
TABLET ORAL
Qty: 30 TABLET | Refills: 11 | Status: SHIPPED | OUTPATIENT
Start: 2022-05-08

## 2022-05-12 ENCOUNTER — OFFICE VISIT (OUTPATIENT)
Dept: NEUROLOGY | Facility: CLINIC | Age: 37
End: 2022-05-12
Payer: COMMERCIAL

## 2022-05-12 VITALS
BODY MASS INDEX: 29.71 KG/M2 | SYSTOLIC BLOOD PRESSURE: 117 MMHG | WEIGHT: 137.3 LBS | TEMPERATURE: 97.8 F | HEART RATE: 80 BPM | DIASTOLIC BLOOD PRESSURE: 62 MMHG

## 2022-05-12 DIAGNOSIS — G43.109 VERTIGINOUS MIGRAINE: ICD-10-CM

## 2022-05-12 DIAGNOSIS — F81.9 COGNITIVE DEVELOPMENTAL DELAY: ICD-10-CM

## 2022-05-12 DIAGNOSIS — G40.909 NONINTRACTABLE EPILEPSY WITHOUT STATUS EPILEPTICUS, UNSPECIFIED EPILEPSY TYPE (HCC): Primary | ICD-10-CM

## 2022-05-12 PROCEDURE — 99215 OFFICE O/P EST HI 40 MIN: CPT | Performed by: STUDENT IN AN ORGANIZED HEALTH CARE EDUCATION/TRAINING PROGRAM

## 2022-05-12 RX ORDER — CARBAMAZEPINE 300 MG/1
300 CAPSULE, EXTENDED RELEASE ORAL 2 TIMES DAILY
Qty: 60 CAPSULE | Refills: 11 | Status: SHIPPED | OUTPATIENT
Start: 2022-05-12

## 2022-05-12 NOTE — PROGRESS NOTES
[facility] -   Follow up appointment    Impression/Plan    Blane Cruz is a 30 y o  female with  intellectual disability and focal epilepsy (last EEG study found left centrotemporal epileptiform discharges, but no recurrent seizure for nearly 20 years), who is returning to Neurology office for follow up of her seizures  Her neurological exam is unchanged  Family is interested in simplifying her medication regimen  Plan outlined below:    Epilepsy [facility])  Patient with focal epilepsy based on most recent EEG with left centrotemporal epileptiform discharges maintained on Carbatrol 300 mg twice per day for many years  She continues to be seizure-free for nearly 20 years on this medication  She is tolerating this medication very well without any issues or concerns  The mother is wondering if we can decrease/wean this medication  Will order routine EEG  If abnormal would likely not advise this  For now, she will continue on Carbatrol 300 mg twice per day  Mother will call the office with any breakthrough seizures or concerns        Cognitive developmental delay    At baseline level of functioning  No changes per mother  Whom she lives with  Continue with supportive care      Vertiginous migraine  Since Headaches resolved will stop topiramate  Family aware that headaches may return with discontinuation of the medication  We discussed the pathophysiology of epilepsy/seizure and seizure safety/precautions  We discussed factors that can lower seizure threshold and the side effects of antiepileptic medications  Follow-up in 6 months or sooner if needed  Diagnoses and all orders for this visit:    Nonintractable epilepsy without status epilepticus, unspecified epilepsy type ([facility] [address] )  -     EEG Routine and awake; Future  -     Carbatrol 300 MG 12 hr capsule;  Take 1 capsule (300 mg total) by mouth in the morning and 1 capsule (300 mg total) in the evening   -     Carbamazepine level, total; Future  -     Comprehensive metabolic panel; Future    Cognitive developmental delay    Vertiginous migraine        Subjective    Ousmane Levin is a 29 y o  female with PMH of intellectual disability and focal epilepsy (last EEG study found left centrotemporal epileptiform discharges, but no recurrent seizure for nearly 20 years), who is returning to Neurology office for follow up of her seizures  For review:   She was seen by Dr Chuck Houser on 6/4/2021  At that time, it was noted that she tolerated brand name Carbatrol 300 mg BID (generic caused abdominal cramps) and was remaining seizure free  She was also having difficulties with migraines which were more frequent in the prior year so she was started on topiramate 50 mg HS for her migraines       She was last seen by Uma Katz on 10/8/2021  She continued to be seizure free on Carbatrol 300 mg BID  Denied any side effects from this medication  Her migraines were much better  She did note diarrhea about once per week since starting topiramate  Her appetite was decreased but not significantly  No weight loss  Plan at that time was was to continue Carbatrol for seizure control and Topiramate 50 qhs for migraines  Interval history:    Since last seen, the patient has been doing well  There have been no seizures since the last appointment  The patients AEDs were being tolerated well with no side effects  There is no concern for medication non-adherence  The mother states that she no longer has headaches and would like to discuss stopping the topiramate  She also is wondering if she still needs to Carbatrol  Otherwise there is no major concerns/questions  History Reviewed: The following were reviewed and updated as appropriate: allergies, current medications, past family history, past medical history, past social history, past surgical history and problem list    ROS:  Review of Systems   Constitutional: Negative  Negative for appetite change and fever  HENT: Negative  Negative for hearing loss, tinnitus, trouble swallowing and voice change  Eyes: Negative  Negative for photophobia and pain  Respiratory: Negative  Negative for shortness of breath  Cardiovascular: Negative  Negative for palpitations  Gastrointestinal: Negative  Negative for nausea and vomiting  Endocrine: Negative  Negative for cold intolerance  Genitourinary: Negative  Negative for dysuria, frequency and urgency  Musculoskeletal: Negative  Negative for myalgias and neck pain  Skin: Negative  Negative for rash  Neurological: Negative  Negative for dizziness, tremors, seizures, syncope, facial asymmetry, speech difficulty, weakness, light-headedness, numbness and headaches  Hematological: Negative  Does not bruise/bleed easily  Psychiatric/Behavioral: Negative  Negative for confusion, hallucinations and sleep disturbance  All other systems reviewed and are negative  Objective    /62   Pulse 80   Temp 97 8 °F (36 6 °C)   Wt 62 3 kg (137 lb 4 8 oz)   BMI 29 71 kg/m²      General Exam    No apparent distress  Appears well nourished    Flat affect     Neurologic Exam  Mental status- alert and oriented to person, place, and time  Speech appropriate for conversation  Limited attention and knowledge      Cranial Nerves- PERRL, EOMS normal, facial sensation and muscles symmetric, hearing intact bilaterally to finger rubs, tongue midline, palate rise symmetrical, shoulder shrug symmetrical      Motor- No pronator drift  5/5 strength all extremities  Moves all extremities freely  No tremor      Sensory-  Intact distally in all extremities to light touch       DTRs-  2+ and symmetric in all extremities     Gait- normal casual gait       Coordination- FNF intact     Juan Collazo MD   [facility]  [facility] [address] [facility]

## 2022-06-01 ENCOUNTER — OFFICE VISIT (OUTPATIENT)
Dept: DENTISTRY | Facility: CLINIC | Age: 37
End: 2022-06-01

## 2022-06-01 VITALS — TEMPERATURE: 97.6 F | SYSTOLIC BLOOD PRESSURE: 117 MMHG | HEART RATE: 78 BPM | DIASTOLIC BLOOD PRESSURE: 63 MMHG

## 2022-06-01 DIAGNOSIS — K02.9 CARIES: Primary | ICD-10-CM

## 2022-06-01 PROCEDURE — D2391 RESIN-BASED COMPOSITE - 1 SURFACE, POSTERIOR: HCPCS | Performed by: DENTIST

## 2022-08-16 ENCOUNTER — OFFICE VISIT (OUTPATIENT)
Dept: DENTISTRY | Facility: CLINIC | Age: 37
End: 2022-08-16

## 2022-08-16 DIAGNOSIS — K02.9 DENTAL CARIES: Primary | ICD-10-CM

## 2022-08-16 PROCEDURE — D2391 RESIN-BASED COMPOSITE - 1 SURFACE, POSTERIOR: HCPCS | Performed by: DENTIST

## 2022-08-29 ENCOUNTER — OFFICE VISIT (OUTPATIENT)
Dept: DENTISTRY | Facility: CLINIC | Age: 37
End: 2022-08-29

## 2022-08-29 VITALS — HEART RATE: 72 BPM | DIASTOLIC BLOOD PRESSURE: 58 MMHG | TEMPERATURE: 96.9 F | SYSTOLIC BLOOD PRESSURE: 100 MMHG

## 2022-08-29 DIAGNOSIS — Z01.20 ENCOUNTER FOR DENTAL EXAMINATION AND CLEANING WITHOUT ABNORMAL FINDINGS: Primary | ICD-10-CM

## 2022-08-29 PROBLEM — K05.10 GINGIVITIS: Status: ACTIVE | Noted: 2022-08-29

## 2022-08-29 PROCEDURE — D0120 PERIODIC ORAL EVALUATION - ESTABLISHED PATIENT: HCPCS

## 2022-08-29 PROCEDURE — D1110 PROPHYLAXIS - ADULT: HCPCS

## 2022-08-29 NOTE — PROGRESS NOTES
Prophy    Dental procedures in this visit     - PROPHYLAXIS - ADULT (Completed)     Service provider: Arielle Lira     Billing provider: Bartolo Wiggins DDS     - PERIODIC ORAL EVALUATION - ESTABLISHED PATIENT (Completed)     Service provider: Bonifacio Benítez DMD     Billing provider: Bartolo Wiggins DDS      Completion details     - PROPHYLAXIS - ADULT (Completed)     - PERIODIC ORAL EVALUATION - ESTABLISHED PATIENT (Completed)    See notes         CC: None  Pt presented with mother to appt who came back to room with patient, pt is special needs but cooperated very well  Reviewed Medical History  ASA: II  Translation line used: no     Method Used:  · Prophy Method Used: Ultrasonic Scaling  · Hand Scaling  · Polished  · Flossed    Radiographs Taken:  · None    Intra/Extra Oral Cancer Screening:  · Within normal limits      Oral Hygiene:  · Fair    Plaque:  · Generalized  · Light    Calculus:  · None    Bleeding:  · Bleeding on probing: No periodontal exam for this visit  · Generalized  · Light  · Moderate    Stain:  · None    Periodontal Charting:  · Spot probing    Periodontal Classification:  · Generalized  · Mild  · Gingivitis      Nutritional Counseling:  · N/A    OHI: Stressed importance of brushing 2x/day and flossing daily  Recommended daily mouthrinse  Pt is currently brushing 2x/day and flossing 1x/day with the help of mother  Arielle Lira [facility]     No orders of the defined types were placed in this encounter  Periodic Exam:  Dr Boo Vazquez did exam:    Decay present: no decay but watches placed on #5-D, #13-D, #14-M and OCC  Check #13-D with next set of bws  OCS-neg    Pt dismissed in good health, no complications and all questions answered  NV: 6 mrc, periodic exam, 4 bws with hygiene

## 2022-08-30 ENCOUNTER — HOSPITAL ENCOUNTER (OUTPATIENT)
Dept: NEUROLOGY | Facility: CLINIC | Age: 37
Discharge: HOME | End: 2022-08-30
Attending: STUDENT IN AN ORGANIZED HEALTH CARE EDUCATION/TRAINING PROGRAM
Payer: COMMERCIAL

## 2022-08-30 DIAGNOSIS — G40.909 NONINTRACTABLE EPILEPSY WITHOUT STATUS EPILEPTICUS, UNSPECIFIED EPILEPSY TYPE (HCC): ICD-10-CM

## 2022-08-30 PROCEDURE — 95816 EEG AWAKE AND DROWSY: CPT | Performed by: PSYCHIATRY & NEUROLOGY

## 2022-08-30 PROCEDURE — 95816 EEG AWAKE AND DROWSY: CPT

## 2022-09-13 ENCOUNTER — APPOINTMENT (OUTPATIENT)
Dept: LAB | Facility: CLINIC | Age: 37
End: 2022-09-13
Payer: COMMERCIAL

## 2022-09-13 DIAGNOSIS — G40.909 NONINTRACTABLE EPILEPSY WITHOUT STATUS EPILEPTICUS, UNSPECIFIED EPILEPSY TYPE (HCC): ICD-10-CM

## 2022-09-13 LAB
ALBUMIN SERPL BCP-MCNC: 3.7 G/DL (ref 3.5–5)
ALP SERPL-CCNC: 108 U/L (ref 46–116)
ALT SERPL W P-5'-P-CCNC: 22 U/L (ref 12–78)
ANION GAP SERPL CALCULATED.3IONS-SCNC: 5 MMOL/L (ref 4–13)
AST SERPL W P-5'-P-CCNC: 18 U/L (ref 5–45)
BILIRUB SERPL-MCNC: 0.28 MG/DL (ref 0.2–1)
BUN SERPL-MCNC: 9 MG/DL (ref 5–25)
CALCIUM SERPL-MCNC: 9.1 MG/DL (ref 8.3–10.1)
CARBAMAZEPINE SERPL-MCNC: 4.4 UG/ML (ref 4–12)
CHLORIDE SERPL-SCNC: 107 MMOL/L (ref 96–108)
CO2 SERPL-SCNC: 26 MMOL/L (ref 21–32)
CREAT SERPL-MCNC: 0.56 MG/DL (ref 0.6–1.3)
GFR SERPL CREATININE-BSD FRML MDRD: 121 ML/MIN/1.73SQ M
GLUCOSE P FAST SERPL-MCNC: 80 MG/DL (ref 65–99)
POTASSIUM SERPL-SCNC: 3.9 MMOL/L (ref 3.5–5.3)
PROT SERPL-MCNC: 7.5 G/DL (ref 6.4–8.4)
SODIUM SERPL-SCNC: 138 MMOL/L (ref 135–147)

## 2022-09-13 PROCEDURE — 80053 COMPREHEN METABOLIC PANEL: CPT

## 2022-09-13 PROCEDURE — 80156 ASSAY CARBAMAZEPINE TOTAL: CPT

## 2022-09-13 PROCEDURE — 36415 COLL VENOUS BLD VENIPUNCTURE: CPT

## 2022-09-18 ENCOUNTER — HOSPITAL ENCOUNTER (EMERGENCY)
Facility: HOSPITAL | Age: 37
Discharge: HOME | End: 2022-09-18
Attending: EMERGENCY MEDICINE
Payer: COMMERCIAL

## 2022-09-18 ENCOUNTER — APPOINTMENT (OUTPATIENT)
Dept: RADIOLOGY | Facility: HOSPITAL | Age: 37
End: 2022-09-18
Payer: COMMERCIAL

## 2022-09-18 VITALS
BODY MASS INDEX: 29.72 KG/M2 | TEMPERATURE: 98.4 F | HEART RATE: 69 BPM | SYSTOLIC BLOOD PRESSURE: 125 MMHG | WEIGHT: 137.35 LBS | OXYGEN SATURATION: 100 % | RESPIRATION RATE: 18 BRPM | DIASTOLIC BLOOD PRESSURE: 94 MMHG

## 2022-09-18 DIAGNOSIS — S93.409A ANKLE SPRAIN: ICD-10-CM
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
## 2022-09-18 PROCEDURE — 99284 EMERGENCY DEPT VISIT MOD MDM: CPT | Performed by: EMERGENCY MEDICINE

## 2022-09-18 PROCEDURE — 73630 X-RAY EXAM OF FOOT: CPT

## 2022-09-18 PROCEDURE — 73610 X-RAY EXAM OF ANKLE: CPT

## 2022-09-18 PROCEDURE — 99283 EMERGENCY DEPT VISIT LOW MDM: CPT

## 2022-09-18 PROCEDURE — 73140 X-RAY EXAM OF FINGER(S): CPT

## 2022-09-18 RX ORDER — DOXYCYCLINE HYCLATE 100 MG/1
100 CAPSULE ORAL ONCE
Status: COMPLETED | OUTPATIENT
Start: 2022-09-18 | End: 2022-09-18

## 2022-09-18 RX ORDER — DOXYCYCLINE HYCLATE 100 MG/1
100 CAPSULE ORAL 2 TIMES DAILY
Qty: 28 CAPSULE | Refills: 0 | Status: SHIPPED | OUTPATIENT
Start: 2022-09-18 | End: 2022-10-02

## 2022-09-18 RX ADMIN — DOXYCYCLINE 100 MG: 100 CAPSULE ORAL at 20:00

## 2022-09-19 NOTE — ED NOTES
X-ray at bedside     Osmel Warner, [facility]  09/18/22 2009

## 2022-09-20 NOTE — ED ATTENDING ATTESTATION
9/18/2022  IBandar MD, saw and evaluated the patient  I have discussed the patient with the resident/non-physician practitioner and agree with the resident's/non-physician practitioner's findings, Plan of Care, and MDM as documented in the resident's/non-physician practitioner's note, except where noted  All available labs and Radiology studies were reviewed  I was present for key portions of any procedure(s) performed by the resident/non-physician practitioner and I was immediately available to provide assistance  At this point I agree with the current assessment done in the Emergency Department  I have conducted an independent evaluation of this patient a history and physical is as follows: This is a 76-year-old female with past medical history of hypothyroidism, electrolyte disability, presenting with her mother following dog bite  Their family dog nipped at patient's right index finger yesterday resulting in several small puncture wounds and bite marks  Patient's mother cleaned the wounds  Since then, finger became more swollen and there presenting for further evaluation  Several days ago, patient twisted her right ankle  She did not fall down  She does report pain in her right ankle  Patient's family dog is up-to-date on immunizations  Patient's last tetanus was 11/15/2016  Patient has not had any fevers, chills, chest pain, shortness of breath, numbness, tingling, she has had pain in her right index finger as well as in her right ankle  Blood pressure 125/94, pulse 69, temperature 98 4 °F (36 9 °C), resp  rate 18, weight 62 3 kg (137 lb 5 6 oz), SpO2 100 %  Constitutional:  Awake, alert, oriented  No acute distress  HEENT:  Normocephalic, atraumatic  Sclera anicteric, conjunctiva not injected  Moist oral mucosa  Cardiac:  Appears well-perfused  Respiratory:  Breathing comfortably on room air  Abdomen:  Nondistended  Extremities:  No deformities, no edema  Examination of right upper extremity reveals superficial bite wounds to right index finger, predominantly to the radial aspect of the index finger  Finger is mildly edematous, however, there is no redness or red streaks traveling up proximally  There is no pain with passive stretch  Range of motion at MCP, PIP, DI P somewhat diminished by swelling  There is no pain out of proportion to exam   Patient is moving her right hand and fingers spontaneously  Capillary refill is less than 2 seconds  Examination of right ankle reveals mild tenderness to palpation to the right anterior ankle, there is no tenderness over medial or lateral malleoli, there is no tenderness over the base of 5th or the navicular  2+ dorsalis pedis and posterior tibial pulses  Integument:  No rashes over exposed areas, cap refill less than 2 seconds  Neurologic:  Awake, alert, and oriented x3  Nonfocal exam   Psychiatric:  Normal affect    XR finger second digit-index RIGHT   Final Result      No acute osseous abnormality  Workstation performed: [id number]         XR ankle 3+ views RIGHT   Final Result      No acute osseous abnormality  Workstation performed: [id number]         XR foot 3+ views RIGHT   Final Result      No acute osseous abnormality  Workstation performed: [id number]               ED Course     Medications   doxycycline hyclate (VIBRAMYCIN) capsule 100 mg (100 mg Oral Given 9/18/22 210)     52-year-old female presenting with right index finger dog bite as well as right ankle pain after twisting it several days ago  Vital signs reviewed, afebrile and within normal limits  Tdap is up-to-date  Concerning patient's right index finger, the right middle phalanx small, 5 mm laceration, does have a small amount of redness surrounding it  None of the other puncture wounds have any redness or purulent drainage  There is no pain with right index finger passive stretch    At present, I have a low index of suspicion for etiologies such as abscess or flexor tenosynovitis  X-ray of the affected finger obtained, revealing no acute fracture, dislocation, or radiopaque foreign body  Since patient has significant allergies to penicillins, with starting patient on a course of doxycycline  Concerning patient's right ankle pain, differential diagnosis includes sprain, strain, fracture, dislocation  X-rays of right ankle and foot obtained, revealing no acute fracture or dislocation to my review  Patient discharged to home with recommendations for symptom control, return precautions, and plan for follow up       Critical Care Time  Procedures

## 2022-09-22 NOTE — ED PROVIDER NOTES
History  Chief Complaint   Patient presents with   [address] JHA on the right index finger when feeding her dog    Ankle Pain     Also having pain in right ankle 3 days ago     HPI    28-year-old female, past medical history significant for intellectual disability, presenting with mother for evaluation following a dog bite  Patient attempted to hold phone away from her family dog, the dog bit her right index finger  Mom thought the finger has swelled which prompted her to bring the patient in for evaluation  Bite was yesterday  Patient denies numbness, weakness  Family dog is up-to-date on immunizations  Patient's last tetanus was in 2016  Patient is also complaining of right ankle pain  Patient states that couple days ago she was jumping on a trampoline, twisted her right ankle  Did not fall down  Has been having pain since then  Has not taken any medications since then  Has been able to ambulate  Prior to Admission Medications   Prescriptions Last Dose Informant Patient Reported? Taking? Carbatrol 300 MG 12 hr capsule   No Yes   Sig: Take 1 capsule (300 mg total) by mouth in the morning and 1 capsule (300 mg total) in the evening     Cholecalciferol (Vitamin D3) 50 MCG (2000 UT) capsule   No Yes   Sig: TAKE 2 CAPSULES (4000 IU) DAILY   fluticasone (FLONASE) 50 mcg/act nasal spray  Family Member No Yes   Si sprays into each nostril daily   folic acid (FOLVITE) 1 mg tablet   No Yes   Sig: take 1 tablet by mouth once daily   levothyroxine 75 mcg tablet   No Yes   Sig: take 1 tablet by mouth once daily   loratadine (CLARITIN) 10 mg tablet   No Yes   Sig: take 1 tablet by mouth once daily   multivitamin (THERAGRAN) TABS  Family Member Yes Yes   Sig: Take 1 tablet by mouth daily   omeprazole (PriLOSEC) 20 mg delayed release capsule   No Yes   Sig: take 1 capsule by mouth once daily      Facility-Administered Medications: None       Past Medical History:   Diagnosis Date    Learning disability     Mental capacity of an 7year old     Seizure ([facility] [address] )     Thyroid condition        Past Surgical History:   Procedure Laterality Date    NO PAST SURGERIES         Family History   Problem Relation Age of Onset   Bharat Yu Migraines Mother     No Known Problems Father      I have reviewed and agree with the history as documented  E-Cigarette/Vaping    E-Cigarette Use Never User      E-Cigarette/Vaping Substances    Nicotine No     THC No     CBD No     Flavoring No     Other No     Unknown No      Social History     Tobacco Use    Smoking status: Never Smoker    Smokeless tobacco: Never Used   Vaping Use    Vaping Use: Never used   Substance Use Topics    Alcohol use: Never    Drug use: Never        Review of Systems   Constitutional: Negative for chills and fever  HENT: Negative for sore throat  Respiratory: Negative for cough and shortness of breath  Cardiovascular: Negative for chest pain, palpitations and leg swelling  Gastrointestinal: Negative for abdominal pain, diarrhea, nausea and vomiting  Genitourinary: Negative for dysuria and frequency  Musculoskeletal: Positive for gait problem  Negative for myalgias  Skin: Negative for rash and wound  Neurological: Negative for dizziness, weakness, light-headedness, numbness and headaches  All other systems reviewed and are negative  Physical Exam  ED Triage Vitals [09/18/22 1920]   Temperature Pulse Respirations Blood Pressure SpO2   98 4 °F (36 9 °C) 69 18 125/94 100 %      Temp src Heart Rate Source Patient Position - Orthostatic VS BP Location FiO2 (%)   -- -- -- -- --      Pain Score       5             Orthostatic Vital Signs  Vitals:    09/18/22 1920   BP: 125/94   Pulse: 69       Physical Exam  Vitals and nursing note reviewed  Constitutional:       General: She is not in acute distress  Appearance: Normal appearance  She is not ill-appearing or toxic-appearing     HENT:      Head: Normocephalic and atraumatic  Right Ear: External ear normal       Left Ear: External ear normal       Nose: Nose normal       Mouth/Throat:      Mouth: Mucous membranes are moist       Pharynx: Oropharynx is clear  Eyes:      General: No scleral icterus  Extraocular Movements: Extraocular movements intact  Cardiovascular:      Rate and Rhythm: Normal rate and regular rhythm  Pulses: Normal pulses  Pulmonary:      Effort: Pulmonary effort is normal  No respiratory distress  Breath sounds: Normal breath sounds  Abdominal:      Palpations: Abdomen is soft  Tenderness: There is no abdominal tenderness  Musculoskeletal:         General: Normal range of motion  Hands:       Cervical back: Normal range of motion and neck supple  Comments: Full range of motion  Feet:      Comments: Mild tenderness palpation to the anterior right ankle  Full active and passive range of motion  Skin:     General: Skin is warm  Capillary Refill: Capillary refill takes less than 2 seconds  Neurological:      General: No focal deficit present  Mental Status: She is alert and oriented to person, place, and time  ED Medications  Medications   doxycycline hyclate (VIBRAMYCIN) capsule 100 mg (100 mg Oral Given 9/18/22 2000)       Diagnostic Studies  Results Reviewed     None                 XR finger second digit-index RIGHT   Final Result by Stephenie Lee MD (09/19 4914)      No acute osseous abnormality  Workstation performed: [id number]         XR ankle 3+ views RIGHT   Final Result by Stephenie Lee MD (09/19 8693)      No acute osseous abnormality  Workstation performed: [id number]         XR foot 3+ views RIGHT   Final Result by Stephenie Lee MD (09/19 9549)      No acute osseous abnormality        Workstation performed: [id number]               Procedures  Procedures      ED Course                             SBIRT 22yo+    Flowsheet Row Most Recent Value   SBIRT (22 yo +)    In order to provide better care to our patients, we are screening all of our patients for alcohol and drug use  Would it be okay to ask you these screening questions? Yes Filed at: 09/18/2022 1926   Initial Alcohol Screen: US AUDIT-C     1  How often do you have a drink containing alcohol? 0 Filed at: 09/18/2022 1926   2  How many drinks containing alcohol do you have on a typical day you are drinking? 0 Filed at: 09/18/2022 1926   3a  Male UNDER 65: How often do you have five or more drinks on one occasion? 0 Filed at: 09/18/2022 1926   3b  FEMALE Any Age, or MALE 65+: How often do you have 4 or more drinks on one occassion? 0 Filed at: 09/18/2022 1926   Audit-C Score 0 Filed at: 09/18/2022 1926   SAILAJA: How many times in the past year have you    Used an illegal drug or used a prescription medication for non-medical reasons? Never Filed at: 09/18/2022 1926                MDM  Number of Diagnoses or Management Options  Ankle sprain  Dog bite, initial encounter  Diagnosis management comments: 71-year-old female presenting for evaluation after a dog bite and for of right ankle injury  On exam the patient has very small superficial bite wounds to the right index finger  Patient is tender right ankle  Will assess for fracture with x-rays of the right 2nd digit, ankle, foot  Patient's tetanus is up-to-date  X-rays were negative on my interpretation  Patient to be discharged with prophylactic antibiotics, hand follow-up, and air cast for right ankle  I gave strict return to ED precautions  I reviewed all testing with the patient: see above  I gave oral return precautions for what to return for in addition to the written return precautions  The patient (and any family present: mother) verbalized understanding of the discharge instructions and warnings that would necessitate return to the Emergency Department    I specifically highlighted areas of special concern regarding the written and verbal discharge instructions and return precautions  All questions were answered prior to discharge  Disposition  Final diagnoses:   Dog bite, initial encounter   Ankle sprain     Time reflects when diagnosis was documented in both MDM as applicable and the Disposition within this note     Time User Action Codes Description Comment    9/18/2022  8:40 PM Kiley Spare Add Brittaney Crockett  0XXA] Dog bite, initial encounter     9/18/2022  8:40 PM Kiley Spare Add [[id number]] Ankle sprain       ED Disposition     ED Disposition   Discharge    Condition   Stable    Date/Time   Sun Sep 18, 2022  8:43 PM    Comment   Janet Lugo discharge to home/self care                 Follow-up Information     Follow up With Specialties Details Why Contact Info Additional [address], MD Orthopedic Surgery, Hand Surgery  For Emergency Department Follow-up [address] [facility] Emergency Department Emergency Medicine  If symptoms worsen [address] [id number]  958 [facility] 64 [facility] Emergency Department, [address], [address]          Discharge Medication List as of 9/18/2022  8:43 PM      START taking these medications    Details   doxycycline hyclate (VIBRAMYCIN) 100 mg capsule Take 1 capsule (100 mg total) by mouth 2 (two) times a day for 14 days, Starting Sun 9/18/2022, Until Sun 10/2/2022, Normal         CONTINUE these medications which have NOT CHANGED    Details   Carbatrol 300 MG 12 hr capsule Take 1 capsule (300 mg total) by mouth in the morning and 1 capsule (300 mg total) in the evening , Starting Thu 5/12/2022, Normal      Cholecalciferol (Vitamin D3) 50 MCG (2000 UT) capsule TAKE 2 CAPSULES (4000 IU) DAILY, Normal      fluticasone (FLONASE) 50 mcg/act nasal spray 2 sprays into each nostril daily, Starting Thu 11/19/2020, Normal      folic acid (FOLVITE) 1 mg tablet take 1 tablet by mouth once daily, Normal      levothyroxine 75 mcg tablet take 1 tablet by mouth once daily, Normal      loratadine (CLARITIN) 10 mg tablet take 1 tablet by mouth once daily, Normal      multivitamin (THERAGRAN) TABS Take 1 tablet by mouth daily, Historical Med      omeprazole (PriLOSEC) 20 mg delayed release capsule take 1 capsule by mouth once daily, Normal           No discharge procedures on file  PDMP Review     None           ED Provider  Attending physically available and evaluated Helen Galvezemmanuelle BENTON managed the patient along with the ED Attending      Electronically Signed by         Taiwo Peña DO  09/22/22 2007

## 2022-09-26 DIAGNOSIS — E06.3 HYPOTHYROIDISM DUE TO HASHIMOTO'S THYROIDITIS: ICD-10-CM

## 2022-09-26 DIAGNOSIS — E03.8 HYPOTHYROIDISM DUE TO HASHIMOTO'S THYROIDITIS: ICD-10-CM

## 2022-09-28 ENCOUNTER — OFFICE VISIT (OUTPATIENT)
Dept: NEUROLOGY | Facility: CLINIC | Age: 37
End: 2022-09-28
Payer: COMMERCIAL

## 2022-09-28 VITALS
HEART RATE: 77 BPM | SYSTOLIC BLOOD PRESSURE: 114 MMHG | DIASTOLIC BLOOD PRESSURE: 59 MMHG | WEIGHT: 143.9 LBS | BODY MASS INDEX: 31.04 KG/M2 | HEIGHT: 57 IN

## 2022-09-28 DIAGNOSIS — G40.909 NONINTRACTABLE EPILEPSY WITHOUT STATUS EPILEPTICUS, UNSPECIFIED EPILEPSY TYPE (HCC): ICD-10-CM

## 2022-09-28 DIAGNOSIS — F81.9 COGNITIVE DEVELOPMENTAL DELAY: Primary | ICD-10-CM

## 2022-09-28 DIAGNOSIS — G43.809 HEADACHE, VARIANT MIGRAINE: ICD-10-CM

## 2022-09-28 PROCEDURE — 99214 OFFICE O/P EST MOD 30 MIN: CPT | Performed by: NURSE PRACTITIONER

## 2022-09-28 RX ORDER — CARBAMAZEPINE 200 MG/1
200 CAPSULE, EXTENDED RELEASE ORAL 2 TIMES DAILY
Qty: 180 CAPSULE | Refills: 1 | Status: SHIPPED | OUTPATIENT
Start: 2022-09-28 | End: 2022-10-12 | Stop reason: SDUPTHER

## 2022-09-28 RX ORDER — CARBAMAZEPINE 300 MG/1
300 CAPSULE, EXTENDED RELEASE ORAL 2 TIMES DAILY
Qty: 60 CAPSULE | Refills: 11 | Status: CANCELLED | OUTPATIENT
Start: 2022-09-28

## 2022-09-28 RX ORDER — CARBAMAZEPINE 200 MG/1
200 CAPSULE, EXTENDED RELEASE ORAL 2 TIMES DAILY
Status: CANCELLED | OUTPATIENT
Start: 2022-09-28

## 2022-09-28 NOTE — ASSESSMENT & PLAN NOTE
Patient with history focal epilepsy based on prior EEG in 2016 with left centro-temporal epileptiform discharges maintained on Carbatrol 300 mg twice per day for many years  She continues to be seizure-free for over 20 years on this medication  The patient did have an EEG about one month ago to determine if weaning off of medication could be attempted  This EEG was normal  Neurologic exam nonfocal except for intellectual disabilities  Discussed results of EEG with patient and mother  We reviewed that given patient has been seizure free and her EEG was normal, we can attempt to wean off of medications  Discussed that even though the EEG was normal, there is still a chance that she could have a breakthrough seizure  Patient and mother do understand this and prefer to wean off very slowly  Since patient is currently on Carbatrol (BRAND ONLY) 300 mg BID, will decrease to 200 mg BID and follow up in 3 months  If she continues to do well without seizures, can decrease to 100 mg BID and eventually stop the medication  Discussed that with any episodes concerning for seizures or clear seizures, mother should call the office right away  Follow up in 3 months or sooner if needed with Dr Villegas

## 2022-09-28 NOTE — PATIENT INSTRUCTIONS
- Decrease carbamazepine to 200 mg twice per day  - Call the office with seizures or concerns  - Follow up in 3 months or sooner if needed

## 2022-09-28 NOTE — PROGRESS NOTES
Patient ID: Yahir Nevarez is a 29 y o  female with intellectual disability and focal epilepsy (last EEG study found left centrotemporal epileptiform discharges, but no recurrent seizure for nearly 20 years), who is returning to Neurology office for follow up of her seizures  Assessment/Plan:    Epilepsy [facility])  Patient with history focal epilepsy based on prior EEG in 2016 with left centro-temporal epileptiform discharges maintained on Carbatrol 300 mg twice per day for many years  She continues to be seizure-free for over 20 years on this medication  The patient did have an EEG about one month ago to determine if weaning off of medication could be attempted  This EEG was normal  Neurologic exam nonfocal except for intellectual disabilities  Discussed results of EEG with patient and mother  We reviewed that given patient has been seizure free and her EEG was normal, we can attempt to wean off of medications  Discussed that even though the EEG was normal, there is still a chance that she could have a breakthrough seizure  Patient and mother do understand this and prefer to wean off very slowly  Since patient is currently on Carbatrol (BRAND ONLY) 300 mg BID, will decrease to 200 mg BID and follow up in 3 months  If she continues to do well without seizures, can decrease to 100 mg BID and eventually stop the medication  Discussed that with any episodes concerning for seizures or clear seizures, mother should call the office right away  Follow up in 3 months or sooner if needed with Dr Lorena Marshall  Cognitive developmental delay    At baseline level of functioning without recent changes  Lives with mother  Continue with supportive care  Headache, variant migraine  Headaches have resolved  No recurrence off of topiramate  Recommend continuing off of topiramate  She will Return in about 3 months (around 12/28/2022) for follow up with Dr Vilma Ureña        Subjective:  Yahir Nevarez is a 29 y o  female with intellectual disability and focal epilepsy (last EEG study found left centrotemporal epileptiform discharges, but no recurrent seizure for nearly 20 years), who is returning to Neurology office for follow up of her seizures  She was last seen in the office by Dr Lena Bundy on 2022  At that time, seizures continued to be well controlled  Noted that her most recent EEG revealed left centrotemporal epileptiform discharges  Mother did have questions regarding discontinuation of medication given prolonged seizure freedom  Dr Lena Bundy recommended EEG and if abnormal would not recommend weaning off of medication  Topiramate was stopped as vertiginous migraines had resolved  Recommended 6 month follow up  EEG was performed on 22 and was normal      Since her last visit, she continues to be seizure free  On carbatrol 300 mg BID without clear side effects  Mother and patient are interested in discontinuing medication  Reviewed risks and EEG results  Agreeable to slow wean  Patient and mother deny any headaches  Doing well off of topiramate without recurrence  No new medical issues since most recent visit  She has been having some issues with weight but believe that this is related to her thyroid  Recently had dosing changes to her levothyroxine  [employer] [place] interpretation used for visit  Current seizure medications:  - carbatrol 300 mg BID (BRAND ONLY)  - folic acid 1 mg daily  Other medications as per Epic  Latest Reference Range & Units 22 10:41   CARBAMAZEPINE LEVEL 4 0 - 12 0 ug/mL 4 4        Seizure semiology:  1   Fatigue then generalized convulsion     Woman of childbearing age with Epilepsy:  Contraception: not sexually active  Folic acid supplement:  No     Special Features  Status epilepticus: No  Self Injury Seizures: No  Precipitating Factors: None     Epilepsy Risk Factors:  Abnormal pregnancy: No  Abnormal birth/: Full term but small for gestational age (4 5lbs)  Abnormal Development: Walking at [age] years  Kamran Beckwith did not develop until [age] years, attention deficit, she needed speech therapy  Febrile seizures, simple: No  Febrile seizures, complex: No  CNS infection: No  Mental retardation: Learning disability  Cerebral palsy: No  Head injury (moderate/severe): Yes  CNS neoplasm: No  CNS malformation: No  Neurosurgical procedure: No  Stroke: No  Alcohol abuse: No  Drug abuse: No  Family history Sz/epilepsy: No     Prior AEDs:  Luminal  Tegretol Syrup  Carbamazepine/Carbatrol (transitioned off to minimize the risk of osteoporosis)  Levetiracetam (weight gain, irritability)  Lamotrigine (depression, irritability)     Prior workup:  Imaging:  MRI brain 3/24/2008  [id number] T2 hyperintense focus within the subcortical white matter of the lateral aspect of the left midfrontal lobe (may be seen in migraine headaches)     5/25/2017 - MRI brain  No intracranial pathology  Mild left anterior ethmoid and maxillary sinus disease     EEGs:  3/21/2008 Dr Breann Ragland  6-7 Hz PDR  Bifrontal sharp and spike activity  Generalized spike-and-wave activity with frontal predominance     11/4-5/2015 24 hours ambulatory EEG  Normal 24 hours ambulatory EEG  Patient event button of dizziness (twice) shows no change to the awake background EEG     07/08/2016 >1 hour EEG  Rare presence of focal spike-slow waves over the left central-temporal region    8/30/2022 Routine EEG  Normal EEG     Past Psychiatric History:  Depression: No  Anxiety: No  Psychosis: No    Her history was also obtained from her mother, who was present at today's visit  I reviewed prior neurology notes, EEG report, most recent labs, as documented in Epic/Zecter, and summarized above  Objective:    Blood pressure 114/59, pulse 77, height 4' 9" (1 448 m), weight 65 3 kg (143 lb 14 4 oz)  Physical Exam  No apparent distress  Appears well nourished     Mood appropriate for situation Neurologic Exam  Mental status- alert and oriented to person, place, and time  Speech appropriate for conversation  Limited attention and knowledge  Cranial Nerves- PERRL, EOMS normal, facial sensation and muscles symmetric, hearing intact bilaterally to finger rubs, tongue midline, palate rise symmetrical, shoulder shrug symmetrical     Motor- No pronator drift  Appropriate strength  Moves all extremities freely  No tremor  Sensory-  Intact distally in all extremities to light touch  DTRs- 2+ and symmetric in all extremities  Gait- normal casual gait  Coordination- FNF intact  ROS:  Review of Systems   Constitutional: Negative  Negative for appetite change and fever  HENT: Negative  Negative for hearing loss, tinnitus, trouble swallowing and voice change  Eyes: Negative  Negative for photophobia and pain  Respiratory: Negative  Negative for shortness of breath  Cardiovascular: Negative  Negative for palpitations  Gastrointestinal: Negative  Negative for nausea and vomiting  Endocrine: Negative  Negative for cold intolerance  Genitourinary: Negative  Negative for dysuria, frequency and urgency  Musculoskeletal: Negative  Negative for myalgias and neck pain  Skin: Negative  Negative for rash  Neurological: Negative  Negative for dizziness, tremors, seizures, syncope, facial asymmetry, speech difficulty, weakness, light-headedness, numbness and headaches  Hematological: Negative  Does not bruise/bleed easily  Psychiatric/Behavioral: Negative  Negative for confusion, hallucinations and sleep disturbance  All other systems reviewed and are negative  ROS obtained by MA and reviewed by myself  This note may have been created using voice recognition software  There may be unintentional errors such as grammatical errors, spelling errors, or pronoun errors

## 2022-09-28 NOTE — ASSESSMENT & PLAN NOTE
At baseline level of functioning without recent changes  Lives with mother  Continue with supportive care

## 2022-10-03 ENCOUNTER — TELEPHONE (OUTPATIENT)
Dept: INTERNAL MEDICINE CLINIC | Facility: CLINIC | Age: 37
End: 2022-10-03

## 2022-10-03 NOTE — TELEPHONE ENCOUNTER
Patients mother called because she noticed her daughters thyroid lab orders are dated for 10/12/22 but patient needs to have this done before her 10/11/22 appointment with endocrinology  Please can it be change as she will like to [facility]  Mom will wait to hear from us

## 2022-10-04 RX ORDER — LEVOTHYROXINE SODIUM 0.07 MG/1
TABLET ORAL
Qty: 90 TABLET | Refills: 1 | Status: SHIPPED | OUTPATIENT
Start: 2022-10-04 | End: 2022-10-11

## 2022-10-04 NOTE — TELEPHONE ENCOUNTER
Called patient, made patient aware as per note  Patient asked if we can send the lab work to [address] [place] [address] where she usually gets her lab work done  I stated to patient if she can give us her their fax number to fax the labs over  Patient stated understanding       Patient transferred to the  to change upcoming appointment for Endo as requested per patient

## 2022-10-05 ENCOUNTER — TELEPHONE (OUTPATIENT)
Dept: NEUROLOGY | Facility: CLINIC | Age: 37
End: 2022-10-05

## 2022-10-05 DIAGNOSIS — G40.909 NONINTRACTABLE EPILEPSY WITHOUT STATUS EPILEPTICUS, UNSPECIFIED EPILEPSY TYPE (HCC): ICD-10-CM

## 2022-10-05 LAB
25(OH)D3 SERPL-MCNC: 21 NG/ML (ref 30–100)
T4 FREE SERPL-MCNC: 1 NG/DL (ref 0.8–1.8)
TSH SERPL-ACNC: 0.34 MIU/L

## 2022-10-05 NOTE — TELEPHONE ENCOUNTER
Pt left a vm in [place]   [phone]       Called pt w/  Linnette Mckay #[id number] and left a message on pt's answering machine for a call back

## 2022-10-06 ENCOUNTER — TELEPHONE (OUTPATIENT)
Dept: INTERNAL MEDICINE CLINIC | Facility: CLINIC | Age: 37
End: 2022-10-06

## 2022-10-06 DIAGNOSIS — E06.3 HYPOTHYROIDISM DUE TO HASHIMOTO'S THYROIDITIS: Primary | ICD-10-CM

## 2022-10-06 DIAGNOSIS — E03.8 HYPOTHYROIDISM DUE TO HASHIMOTO'S THYROIDITIS: Primary | ICD-10-CM

## 2022-10-06 NOTE — TELEPHONE ENCOUNTER
Please put in Endocrinology order  Patient has an appointment 10/11/22  Diagnosis Hypothyroidism due to Hashimoto's thyroiditis

## 2022-10-07 NOTE — TELEPHONE ENCOUNTER
Called pt using   Name: Neda Wilma  ID#: [id number]    Per interpretor, pt needs a PA for Carbatrol  Called pharm and it does require PA   PA done in [facility]   KEY:[id number]

## 2022-10-07 NOTE — TELEPHONE ENCOUNTER
Patient's [place] [facility] [place] [facility] [place]   Phone # [phone]

## 2022-10-07 NOTE — TELEPHONE ENCOUNTER
Called pt using   Name: Bartolo Yousif  ID#: [id number]    Interpretor tried twice and left a VM with a CB#  Letter sent

## 2022-10-11 ENCOUNTER — OFFICE VISIT (OUTPATIENT)
Dept: MULTI SPECIALTY CLINIC | Facility: CLINIC | Age: 37
End: 2022-10-11

## 2022-10-11 VITALS
HEIGHT: 57 IN | TEMPERATURE: 98.6 F | WEIGHT: 144.4 LBS | OXYGEN SATURATION: 98 % | BODY MASS INDEX: 31.15 KG/M2 | HEART RATE: 76 BPM | SYSTOLIC BLOOD PRESSURE: 103 MMHG | DIASTOLIC BLOOD PRESSURE: 69 MMHG

## 2022-10-11 DIAGNOSIS — E03.8 CENTRAL HYPOTHYROIDISM: ICD-10-CM

## 2022-10-11 DIAGNOSIS — E66.9 OBESITY (BMI 30-39.9): Primary | ICD-10-CM

## 2022-10-11 DIAGNOSIS — E55.9 VITAMIN D INSUFFICIENCY: ICD-10-CM

## 2022-10-11 PROCEDURE — 99214 OFFICE O/P EST MOD 30 MIN: CPT | Performed by: PHYSICIAN ASSISTANT

## 2022-10-11 RX ORDER — LEVOTHYROXINE SODIUM 0.07 MG/1
75 TABLET ORAL DAILY
Qty: 90 TABLET | Refills: 1 | Status: SHIPPED | OUTPATIENT
Start: 2022-10-11

## 2022-10-11 RX ORDER — ACETAMINOPHEN 160 MG
6000 TABLET,DISINTEGRATING ORAL DAILY
Qty: 90 CAPSULE | Refills: 8 | Status: SHIPPED | OUTPATIENT
Start: 2022-10-11

## 2022-10-11 NOTE — PROGRESS NOTES
Patient Progress Note    CC: hypothyroidism  racom: [id number]  Patient is here with her mother     Referring Provider  Navya Osorio Do  [facility],  [address] [place] [address]     History of Present Illness:     Patient is a 29year old female here for evaluation of hypothyroidism  She has a PMH of developmental delay, epilepsy, migraines  She has had hypothyroidism since 2020  Mother notes that she brought the patient to the PCP for weight gain, which is what prompted the thyroid test  Her TSH and free T4 have been low in the past which is suggestive of central hypothyroidism  She has had an MRI in 2017 which did not show significant abnormalities  She is currently on levothyroxine 75 mcg 1 tablet daily  Patient is taking it 1 hr before breakfast on an empty stomach and at least 4 hrs apart from supplements  Tolerating medication well  No history of external radiation to head/neck/chest  Her mother's aunt had thyroid cancer  No recent Iodine loading in form of medication, biotin or kelp supplements or radiological diagnostic studies  She does have a history of a fall which resulted in a head injury at age 4  Per mother seizures started occurring after that fall  Her menstrual cycles are normal   Most recent TSH is low at 0 34 and free T4 is normal at 1 00  Mother is concerned regarding patients weight gain  Per mother, she does eat healthy and jumps on the trampoline often  Vitamin D deficiency: patient is taking vitamin D3 4000 IU daily  Vitamin D is low at 21      Patient Active Problem List   Diagnosis   • Allergic rhinitis   • Anticonvulsant drug-induced bone softening   • Cognitive developmental delay   • Epilepsy ([address] )   • Headache, variant migraine   • 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   • Vitamin D insufficiency   • Overweight (BMI 25 0-29  9)   • Weight gain   • Hypothyroidism due to Hashimoto's thyroiditis   • Acne vulgaris   • Vertiginous migraine   • Decay, teeth • Gingivitis     Past Medical History:   Diagnosis Date   • Learning disability     Mental capacity of an 7year old    • Seizure ([facility] [address] )    • Thyroid condition       Past Surgical History:   Procedure Laterality Date   • NO PAST SURGERIES        Family History   Problem Relation Age of Onset   • Migraines Mother    • No Known Problems Father      Social History     Tobacco Use   • Smoking status: Never Smoker   • Smokeless tobacco: Never Used   Substance Use Topics   • Alcohol use: Never     Allergies   Allergen Reactions   • Contrast [Iodinated Diagnostic Agents]    • Dust Mite Extract    • Nickel    • Penicillins    • Pollen Extract    • Short Ragweed Pollen Ext      Current Outpatient Medications   Medication Sig Dispense Refill   • carBAMazepine (Carbatrol) 200 mg 12 hr capsule Take 1 capsule (200 mg total) by mouth 2 (two) times a day 180 capsule 1   • Cholecalciferol (Vitamin D3) 50 MCG (2000 UT) capsule Take 3 capsules (6,000 Units total) by mouth daily 90 capsule 8   • fluticasone (FLONASE) 50 mcg/act nasal spray 2 sprays into each nostril daily 16 g 1   • folic acid (FOLVITE) 1 mg tablet take 1 tablet by mouth once daily 30 tablet 11   • levothyroxine 75 mcg tablet Take 1 tablet (75 mcg total) by mouth daily 90 tablet 1   • loratadine (CLARITIN) 10 mg tablet take 1 tablet by mouth once daily 90 tablet 3   • multivitamin (THERAGRAN) TABS Take 1 tablet by mouth daily     • omeprazole (PriLOSEC) 20 mg delayed release capsule take 1 capsule by mouth once daily 90 capsule 3     No current facility-administered medications for this visit  Review of Systems   Constitutional: Negative for activity change, appetite change, fatigue and unexpected weight change  HENT: Negative for trouble swallowing  Eyes: Negative for visual disturbance  Respiratory: Negative for shortness of breath  Cardiovascular: Negative for chest pain and palpitations     Gastrointestinal: Negative for constipation and diarrhea  Endocrine: Negative for cold intolerance and heat intolerance  Musculoskeletal: Negative  Skin: Negative  Neurological: Negative for tremors  Psychiatric/Behavioral: Negative  Physical Exam:  Body mass index is 31 25 kg/m²  /69 (BP Location: Right arm, Patient Position: Sitting, Cuff Size: Standard)   Pulse 76   Temp 98 6 °F (37 °C) (Temporal)   Ht 4' 9" (1 448 m)   Wt 65 5 kg (144 lb 6 4 oz)   SpO2 98%   BMI 31 25 kg/m²    Wt Readings from Last 3 Encounters:   10/11/22 65 5 kg (144 lb 6 4 oz)   09/28/22 65 3 kg (143 lb 14 4 oz)   09/18/22 62 3 kg (137 lb 5 6 oz)       Physical Exam  Vitals and nursing note reviewed  Constitutional:       Appearance: She is well-developed  HENT:      Head: Normocephalic  Eyes:      General: No scleral icterus  Pupils: Pupils are equal, round, and reactive to light  Neck:      Thyroid: No thyromegaly  Cardiovascular:      Rate and Rhythm: Normal rate and regular rhythm  Pulses:           Radial pulses are 2+ on the right side and 2+ on the left side  Heart sounds: No murmur heard  Pulmonary:      Effort: Pulmonary effort is normal  No respiratory distress  Breath sounds: Normal breath sounds  No wheezing  Musculoskeletal:      Cervical back: Neck supple  Skin:     General: Skin is warm and dry  Neurological:      Mental Status: She is alert  Deep Tendon Reflexes: Reflexes are normal and symmetric  Patient's shoes and socks were not removed            Labs:   No results found for: HGBA1C    Lab Results   Component Value Date    HDL 71 04/07/2022    TRIG 70 04/07/2022       Lab Results   Component Value Date    GLUCOSE 85 01/07/2016    CALCIUM 9 1 09/13/2022     01/19/2017    K 3 9 09/13/2022    CO2 26 09/13/2022     09/13/2022    BUN 9 09/13/2022    CREATININE 0 56 (L) 09/13/2022        eGFR   Date Value Ref Range Status   09/13/2022 121 ml/min/1 73sq m Final       Lab Results Component Value Date    ALT 22 09/13/2022    AST 18 09/13/2022    ALKPHOS 108 09/13/2022    BILITOT 0 3 01/19/2017       Lab Results   Component Value Date    CHH2PRNAOTHB 0 011 (L) 05/11/2021    TSH 0 34 (L) 10/05/2022    W3HIIEV 1 20 05/11/2021    B5HVMQN 4 6 (L) 09/10/2021       Plan:    Qasim Wilks was seen today for follow-up  Diagnoses and all orders for this visit:    Central hypothyroidism  TSH is low at 0 34 and free T4 is normal at 1 00  Continue current treatment  Monitor labs   -     Ambulatory Referral to Endocrinology  -     levothyroxine 75 mcg tablet; Take 1 tablet (75 mcg total) by mouth daily  -     T4, free; Future  -     TSH, 3rd generation; Future    Vitamin D insufficiency  Vitamin D low at 21  Increase vitamin D3 to 6000 IU daily  -     Cholecalciferol (Vitamin D3) 50 MCG (2000 UT) capsule; Take 3 capsules (6,000 Units total) by mouth daily  -     Vitamin D 25 hydroxy; Future    Obesity  Referred to nutritionist  Recommended routine exercise as tolerated    Discussed with the patient and all questions fully answered  She will call me if any problems arise      Counseled patient on diagnostic results, prognosis, risk and benefit of treatment options, instruction for management, importance of treatment compliance, risk  factor reduction and impressions    Ginette TANNER [address]  ENDOCRINOLOGY

## 2022-10-12 NOTE — TELEPHONE ENCOUNTER
Calling pharm to make aware of below  No answer at pharm and unable to leave VM  Will try again later

## 2022-10-12 NOTE — TELEPHONE ENCOUNTER
Called pharmacy re: PA approval  Pharmacy now says they never received the script sent on 9/28  Will queue new script  Please sign off if agreeable  Thank you

## 2022-10-13 RX ORDER — CARBAMAZEPINE 200 MG/1
200 CAPSULE, EXTENDED RELEASE ORAL 2 TIMES DAILY
Qty: 180 CAPSULE | Refills: 1 | Status: SHIPPED | OUTPATIENT
Start: 2022-10-13

## 2022-10-13 NOTE — TELEPHONE ENCOUNTER
Called pharm back to see if they received script sent earlier this morning  Receipt confirmed and will be ready for p/u tomorrow afternoon  Called pt with  to make aware      Agent: Carlos Ion  ID: [id number]     Carlos Ion confirms that pt received and understood msg and will p/u meds at [facility] tomorrow afternoon after calling first to ensure it is ready

## 2022-12-31 DIAGNOSIS — J30.89 CHRONIC NONSEASONAL ALLERGIC RHINITIS DUE TO POLLEN: ICD-10-CM

## 2023-01-02 DIAGNOSIS — R10.13 DYSPEPSIA: ICD-10-CM

## 2023-01-03 RX ORDER — OMEPRAZOLE 20 MG/1
CAPSULE, DELAYED RELEASE ORAL
Qty: 90 CAPSULE | Refills: 3 | Status: SHIPPED | OUTPATIENT
Start: 2023-01-03

## 2023-01-03 RX ORDER — LORATADINE 10 MG/1
TABLET ORAL
Qty: 90 TABLET | Refills: 3 | Status: SHIPPED | OUTPATIENT
Start: 2023-01-03

## 2023-01-18 ENCOUNTER — OFFICE VISIT (OUTPATIENT)
Dept: INTERNAL MEDICINE CLINIC | Facility: CLINIC | Age: 38
End: 2023-01-18

## 2023-01-18 VITALS
WEIGHT: 140.8 LBS | TEMPERATURE: 97.9 F | DIASTOLIC BLOOD PRESSURE: 75 MMHG | BODY MASS INDEX: 30.38 KG/M2 | HEIGHT: 57 IN | SYSTOLIC BLOOD PRESSURE: 112 MMHG | HEART RATE: 81 BPM | OXYGEN SATURATION: 100 %

## 2023-01-18 DIAGNOSIS — N39.0 URINARY TRACT INFECTION WITHOUT HEMATURIA, SITE UNSPECIFIED: Primary | ICD-10-CM

## 2023-01-18 DIAGNOSIS — G43.109 VERTIGINOUS MIGRAINE: ICD-10-CM

## 2023-01-18 DIAGNOSIS — R35.0 FREQUENT URINATION: ICD-10-CM

## 2023-01-18 DIAGNOSIS — R30.0 BURNING WITH URINATION: ICD-10-CM

## 2023-01-18 LAB
SL AMB  POCT GLUCOSE, UA: NEGATIVE
SL AMB LEUKOCYTE ESTERASE,UA: NORMAL
SL AMB POCT BILIRUBIN,UA: NEGATIVE
SL AMB POCT BLOOD,UA: NEGATIVE
SL AMB POCT CLARITY,UA: NORMAL
SL AMB POCT COLOR,UA: YELLOW
SL AMB POCT KETONES,UA: NORMAL
SL AMB POCT NITRITE,UA: NEGATIVE
SL AMB POCT PH,UA: 8
SL AMB POCT SPECIFIC GRAVITY,UA: 1.01
SL AMB POCT URINE PROTEIN: NORMAL
SL AMB POCT UROBILINOGEN: NEGATIVE

## 2023-01-18 RX ORDER — SULFAMETHOXAZOLE AND TRIMETHOPRIM 800; 160 MG/1; MG/1
1 TABLET ORAL 2 TIMES DAILY
Qty: 6 TABLET | Refills: 0 | Status: SHIPPED | OUTPATIENT
Start: 2023-01-18 | End: 2023-01-21

## 2023-01-18 RX ORDER — PHENAZOPYRIDINE HYDROCHLORIDE 100 MG/1
100 TABLET, FILM COATED ORAL 3 TIMES DAILY PRN
Qty: 10 TABLET | Refills: 0 | Status: SHIPPED | OUTPATIENT
Start: 2023-01-18

## 2023-01-18 NOTE — ASSESSMENT & PLAN NOTE
Patient reports dizziness x 2 days that is associated with headaches, blurry vision and photophobia  Neuro exam intact today, no deficits noted  Greg Hallpike positive--no nystagmus noted but worsening of dizziness with maneuver  Previously on Topamax for vertiginous migraines and mother reports this was effective  Topamax discontinued by Neuro in September 2022 as headaches were controlled at that time  Recommend Tylenol as needed for headaches and follow up with Neuro as scheduled next week for further guidance on possible need for medication management

## 2023-01-18 NOTE — ASSESSMENT & PLAN NOTE
Patient presents with 1 5 week history of urinary frequency, burning with urination, suprapubic pain  She has history of UTI in April 2020 that was treated with Bactrim DS x 3 days with resolution of symptoms  On exam, no erythema or lesions noted to external labia  UA in office reveals cloudy urine and trace leukocytes  Recommend course of Bactrim -160 mg-Take 1 tablet by mouth twice daily  Also recommend increasing fluid intake, particularly water, and may use pyridium 100 mg-1 tablet 3 times per day as needed for bladder spasms  Please notify office if symptoms not improving/resolved with antibiotic

## 2023-01-18 NOTE — PROGRESS NOTES
Name: Ben Fuller      : 1985      MRN: [id number]  Encounter Provider: JENNIFER Lehman  Encounter Date: 2023   Encounter department: [address]     1  Urinary tract infection without hematuria, site unspecified  Assessment & Plan:  Patient presents with 1 5 week history of urinary frequency, burning with urination, suprapubic pain  She has history of UTI in 2020 that was treated with Bactrim DS x 3 days with resolution of symptoms  On exam, no erythema or lesions noted to external labia  UA in office reveals cloudy urine and trace leukocytes  Recommend course of Bactrim -160 mg-Take 1 tablet by mouth twice daily  Also recommend increasing fluid intake, particularly water, and may use pyridium 100 mg-1 tablet 3 times per day as needed for bladder spasms  Please notify office if symptoms not improving/resolved with antibiotic  Orders:  -     sulfamethoxazole-trimethoprim (BACTRIM DS) 800-160 mg per tablet; Take 1 tablet by mouth 2 (two) times a day for 3 days    2  Frequent urination  -     POCT urine dip    3  Burning with urination  -     phenazopyridine (PYRIDIUM) 100 mg tablet; Take 1 tablet (100 mg total) by mouth 3 (three) times a day as needed for bladder spasms    4  Vertiginous migraine  Assessment & Plan:  Patient reports dizziness x 2 days that is associated with headaches, blurry vision and photophobia  Neuro exam intact today, no deficits noted  Greg Hallpike positive--no nystagmus noted but worsening of dizziness with maneuver  Previously on Topamax for vertiginous migraines and mother reports this was effective  Topamax discontinued by Neuro in 2022 as headaches were controlled at that time  Recommend Tylenol as needed for headaches and follow up with Neuro as scheduled next week for further guidance on possible need for medication management             Subjective      This visit was completed with the assistance of  via patient's aunt per family request     Patient with a past medical history of Learning disability, Seizure ([address] ), and Thyroid condition presents for acute visit today for evaluation of urinary frequency and dizziness  Urinary frequency:  Onset: 1 5 weeks ago  Frequency:  Voiding every 5 minutes   Aggravating factors: Menstrual cycle  Alleviating factors: Cranberry juice--helps a little   Associated symptoms: cloudy urine, burning with urination and suprapubic pain  Reports one episode of abdominal pain w/ diarrhea 2-3 days ago--resolved after bowel movement  Reports occassional nausea and vomiting after eating too much---last episode 1 week ago  Denies any fevers, chills, flank pain   Previous treatments/work ups: none   Last period: 1/3/23   Not drinking very much water--only half bottle per day   Drinks coffee (morning), soda (1/2 cup--w/ caffeine), and juice   April 2020: treated for UTI with Bactrim x 3 days (Telehealth visit with PCP)    Dizziness: Onset: 2 days ago when standing in the shower, also occurred when laying down yesterday, and today when walking down the stairs   Frequency:  3 episodes    Duration: 5 minutes   Description: feels like someone is pulling her back/like she is going to faint, sometimes feels like head is spinning  Aggravating factors: none identified   Alleviating factors: Sleep   Associated symptoms: headaches, blurry vision, photophobia; Denies any numbness/tingling/weakess in extremities, nausea, vomiting, recent seizures  Previous treatments/work ups: Tylenol--last dose last night  Follows [facility]--previously on Topamax for headaches, migraine variant--mom reports patient would get dizziness with headaches in the past and Topamax helped with both--Topamax stopped in September 2022 by Neuro as headaches were controlled at that time         Review of Systems   Constitutional: Negative for chills and fever  HENT: Negative for congestion, ear pain and sore throat  Eyes: Negative for pain and visual disturbance  Respiratory: Negative for cough and shortness of breath  Cardiovascular: Negative for chest pain and palpitations  Gastrointestinal: Negative for abdominal pain, nausea and vomiting  Genitourinary: Positive for dysuria, frequency and urgency  Negative for flank pain and hematuria  Musculoskeletal: Negative for arthralgias and back pain  Skin: Negative for color change and rash  Neurological: Positive for dizziness and headaches  Negative for seizures, syncope, speech difficulty, weakness and numbness  All other systems reviewed and are negative  Current Outpatient Medications on File Prior to Visit   Medication Sig   • carBAMazepine (Carbatrol) 200 mg 12 hr capsule Take 1 capsule (200 mg total) by mouth 2 (two) times a day   • Cholecalciferol (Vitamin D3) 50 MCG (2000 UT) capsule Take 3 capsules (6,000 Units total) by mouth daily   • fluticasone (FLONASE) 50 mcg/act nasal spray 2 sprays into each nostril daily   • folic acid (FOLVITE) 1 mg tablet take 1 tablet by mouth once daily   • levothyroxine 75 mcg tablet Take 1 tablet (75 mcg total) by mouth daily   • loratadine (CLARITIN) 10 mg tablet take 1 tablet by mouth once daily   • multivitamin (THERAGRAN) TABS Take 1 tablet by mouth daily   • omeprazole (PriLOSEC) 20 mg delayed release capsule take 1 capsule by mouth once daily       Objective     /75 (BP Location: Left arm, Patient Position: Sitting, Cuff Size: Standard)   Pulse 81   Temp 97 9 °F (36 6 °C) (Temporal)   Ht 4' 9" (1 448 m)   Wt 63 9 kg (140 lb 12 8 oz)   SpO2 100%   BMI 30 47 kg/m²     Physical Exam  Vitals and nursing note reviewed  Constitutional:       General: She is not in acute distress  Appearance: Normal appearance  HENT:      Head: Normocephalic        Right Ear: Tympanic membrane and external ear normal       Left Ear: Tympanic membrane and external ear normal       Nose: Nose normal  No congestion  Mouth/Throat:      Mouth: Mucous membranes are moist    Eyes:      Extraocular Movements: Extraocular movements intact  Conjunctiva/sclera: Conjunctivae normal       Pupils: Pupils are equal, round, and reactive to light  Cardiovascular:      Rate and Rhythm: Normal rate and regular rhythm  Pulses: Normal pulses  Heart sounds: Normal heart sounds  Pulmonary:      Effort: Pulmonary effort is normal       Breath sounds: Normal breath sounds  Abdominal:      General: Bowel sounds are normal  There is no distension  Palpations: Abdomen is soft  Genitourinary:     General: Normal vulva  Musculoskeletal:         General: Normal range of motion  Cervical back: Normal range of motion  Skin:     General: Skin is warm and dry  Capillary Refill: Capillary refill takes less than 2 seconds  Neurological:      Mental Status: She is alert and oriented to person, place, and time  Cranial Nerves: Cranial nerves 2-12 are intact  Sensory: Sensation is intact  Motor: Motor function is intact  Coordination: Coordination is intact  Romberg sign negative  Coordination normal  Heel to Shin Test normal       Gait: Gait is intact  Tandem walk normal       Comments: Lele Stephen positive--no nystagmus noted but worsening of dizziness with maneuver      Psychiatric:         Mood and Affect: Mood normal          Behavior: Behavior normal        JENNIFER Deutsch

## 2023-01-21 LAB — BACTERIA UR CULT: ABNORMAL

## 2023-01-23 DIAGNOSIS — N39.0 URINARY TRACT INFECTION WITHOUT HEMATURIA, SITE UNSPECIFIED: Primary | ICD-10-CM

## 2023-01-23 RX ORDER — NITROFURANTOIN 25; 75 MG/1; MG/1
100 CAPSULE ORAL 2 TIMES DAILY
Qty: 10 CAPSULE | Refills: 0 | Status: SHIPPED | OUTPATIENT
Start: 2023-01-23 | End: 2023-01-28

## 2023-01-24 ENCOUNTER — OFFICE VISIT (OUTPATIENT)
Dept: NEUROLOGY | Facility: CLINIC | Age: 38
End: 2023-01-24

## 2023-01-24 VITALS
BODY MASS INDEX: 30.44 KG/M2 | DIASTOLIC BLOOD PRESSURE: 63 MMHG | HEIGHT: 57 IN | TEMPERATURE: 98.7 F | SYSTOLIC BLOOD PRESSURE: 125 MMHG | WEIGHT: 141.1 LBS | OXYGEN SATURATION: 99 % | HEART RATE: 88 BPM

## 2023-01-24 DIAGNOSIS — G40.909 NONINTRACTABLE EPILEPSY WITHOUT STATUS EPILEPTICUS, UNSPECIFIED EPILEPSY TYPE (HCC): Primary | ICD-10-CM

## 2023-01-24 DIAGNOSIS — Z79.899 MEDICATION DOSE DECREASED: ICD-10-CM

## 2023-01-24 RX ORDER — CARBAMAZEPINE 100 MG/1
100 CAPSULE, EXTENDED RELEASE ORAL 2 TIMES DAILY
Qty: 180 CAPSULE | Refills: 3 | Status: SHIPPED | OUTPATIENT
Start: 2023-01-24

## 2023-01-24 NOTE — PROGRESS NOTES
Review of Systems   Constitutional: Negative  Negative for appetite change and fever  HENT: Negative  Negative for hearing loss, tinnitus, trouble swallowing and voice change  Eyes: Negative  Negative for photophobia, pain and visual disturbance  Respiratory: Negative  Negative for shortness of breath  Cardiovascular: Negative  Negative for palpitations  Gastrointestinal: Negative  Negative for nausea and vomiting  Endocrine: Negative  Negative for cold intolerance  Genitourinary: Negative  Negative for dysuria, frequency and urgency  Musculoskeletal: Negative  Negative for gait problem, myalgias and neck pain  Skin: Negative  Negative for rash  Allergic/Immunologic: Negative  Neurological: Negative  Negative for dizziness, tremors, seizures, syncope, facial asymmetry, speech difficulty, weakness, light-headedness, numbness and headaches  Hematological: Negative  Does not bruise/bleed easily  Psychiatric/Behavioral: Negative  Negative for confusion, hallucinations and sleep disturbance  All other systems reviewed and are negative  Recently that she had a urinary tract infection

## 2023-01-24 NOTE — PROGRESS NOTES
[facility] -   Follow up appointment    Impression/Plan    Carloz Tucker is a 40 y o  female with a PMH of intellectual disability and focal epilepsy (past EEGs found left centrotemporal epileptiform discharges, but no recurrent seizure for nearly 20 years, and most recent was normal), who is returning to Neurology office for follow up of her seizures  Her neurological exam is unchanged since last seen  As planned we will continue to slowly wean the carbatrol  Plan outlined below:    #Epilepsy  - Decrease carbatrol to 100 BID  - Follow up in 4 months, if still seizure free will consider weaning AED completely at that time  We discussed the pathophysiology of epilepsy  We discussed risks and benefits of weaning off of an AED and the recurrence risk of seizures after stopping therapy  Diagnoses and all orders for this visit:    Nonintractable epilepsy without status epilepticus, unspecified epilepsy type ([facility] [address] )  -     carbamazepine (CARBATROL) 100 MG 12 hr capsule; Take 1 capsule (100 mg total) by mouth 2 (two) times a day    Medication dose decreased        Subjective    Carloz Tucker is a 40 y o  female with a PMH of intellectual disability and focal epilepsy (past EEGs found left centrotemporal epileptiform discharges, but no recurrent seizure for nearly 20 years, and most recent was normal), who is returning to Neurology office for follow up of her seizures  For review:     She was seen in the office by me on 5/12/2022  At that time, seizures continued to be well controlled  Noted that her most recent EEG revealed left centrotemporal epileptiform discharges  Mother did have questions regarding discontinuation of medication given prolonged seizure freedom  Dr Pepe Form recommended EEG and if abnormal would not recommend weaning off of medication   Topiramate was stopped as vertiginous migraines had resolved       EEG was performed on 8/30/22 and was normal      She followed up with [facility] on 2022  She continued to be seizure free on carbatrol 300 mg BID without clear side effects  Mother and patient are interested in discontinuing medication  Reviewed risks and EEG results  Agreeable to slow wean  Plan was to decrease the Carbatrol to 200 BID and follow up in 3 months       Interval history:    The patient is here today with her mother and required a   Since last seen, the patient has been doing well  There have been no seizures since the last appointment  The patients lower dose of carbatrol 200 BID was tolerated well without issue  There is no concern for medication non-adherence  Recently that she had a urinary tract infection  Otherwise no questions or concerns  Seizure semiology:  1   Fatigue then generalized convulsion     Woman of childbearing age with Epilepsy:  Contraception: not sexually active  Folic acid supplement:  No     Special Features  Status epilepticus: No  Self Injury Seizures: No  Precipitating Factors: None     Epilepsy Risk Factors:  Abnormal pregnancy: No  Abnormal birth/: Full term but small for gestational age (4 5lbs)  Abnormal Development: Walking at [age] years  Sandra Pizano did not develop until [age] years, attention deficit, she needed speech therapy  Febrile seizures, simple: No  Febrile seizures, complex: No  CNS infection: No  Mental retardation: Learning disability  Cerebral palsy: No  Head injury (moderate/severe): Yes  CNS neoplasm: No  CNS malformation: No  Neurosurgical procedure: No  Stroke: No  Alcohol abuse: No  Drug abuse: No  Family history Sz/epilepsy: No     Prior AEDs:  Luminal  Tegretol Syrup  Carbamazepine/Carbatrol (transitioned off to minimize the risk of osteoporosis)  Levetiracetam (weight gain, irritability)  Lamotrigine (depression, irritability)     Prior workup:  Imaging:  MRI brain 3/24/2008  [id number] T2 hyperintense focus within the subcortical white matter of the lateral aspect of the left midfrontal lobe (may be seen in migraine headaches)     5/25/2017 - MRI brain  No intracranial pathology  Mild left anterior ethmoid and maxillary sinus disease     EEGs:  3/21/2008 Dr Banks Client  6-7 Hz PDR  Bifrontal sharp and spike activity  Generalized spike-and-wave activity with frontal predominance     11/4-5/2015 24 hours ambulatory EEG  Normal 24 hours ambulatory EEG  Patient event button of dizziness (twice) shows no change to the awake background EEG     07/08/2016 >1 hour EEG  Rare presence of focal spike-slow waves over the left central-temporal region     8/30/2022 Routine EEG  Normal EEG    History Reviewed: The following were reviewed and updated as appropriate: allergies, current medications, past family history, past medical history, past social history, past surgical history and problem list    ROS:  Constitutional: Negative  Negative for appetite change and fever  HENT: Negative  Negative for hearing loss, tinnitus, trouble swallowing and voice change  Eyes: Negative  Negative for photophobia, pain and visual disturbance  Respiratory: Negative  Negative for shortness of breath  Cardiovascular: Negative  Negative for palpitations  Gastrointestinal: Negative  Negative for nausea and vomiting  Endocrine: Negative  Negative for cold intolerance  Genitourinary: Negative  Negative for dysuria, frequency and urgency  Musculoskeletal: Negative  Negative for gait problem, myalgias and neck pain  Skin: Negative  Negative for rash  Allergic/Immunologic: Negative  Neurological: Negative  Negative for dizziness, tremors, seizures, syncope, facial asymmetry, speech difficulty, weakness, light-headedness, numbness and headaches  Hematological: Negative  Does not bruise/bleed easily  Psychiatric/Behavioral: Negative  Negative for confusion, hallucinations and sleep disturbance  All other systems reviewed and are negative      Objective    /63 (BP Location: Left arm, Patient Position: Sitting, Cuff Size: Adult)   Pulse 88   Temp 98 7 °F (37 1 °C) (Temporal)   Ht 4' 9" (1 448 m)   Wt 64 kg (141 lb 1 6 oz)   SpO2 99%   BMI 30 53 kg/m²      General Exam  General: well developed, no acute distress  HEENT: mucous membranes moist, anicteric sclera  Neck: supple, good ROM  Extremities: no clubbing, cyanosis or edema  Skin: no rash on visible skin  Neurological Exam  Mental Status: awake and alert  Attention and memory intact  Fund of knowledge is appropriate for age and education  There is no neglect  Language: fluency, and comprehension normal        Cranial Nerves: Pupils equal and reactive to light  Visual fields full to confrontation  Extraocular motions intact with full versions, normal pursuits and saccades  Facial strength full and symmetric  Hearing intact to voice  Tongue protrudes to midline  Palate elevates symmetrically  Speech clear without notable dysarthria  Motor: Normal bulk and tone  No pronator drift  Strength is 5/5 proximally and distally in all 4 extremities  No involuntary movements  Sensory: Sensation intact to light touch distally in all extremities  Coordination: Normal finger-to-nose  Normal rapid alternating movements  Station and gait: Casual gait normal      Reflexes: Reflexes 2+ throughout and symmetric        Saranya Knapp MD   [facility]  [facility] [address] [facility] Clinical Neuro[facility]iology

## 2023-01-24 NOTE — PATIENT INSTRUCTIONS
Decrease carbamazepine to 100 mg twice a day from 200 mg twice a day    Monitor for anything concerning for seizures    Follow up in 4 months

## 2023-02-07 ENCOUNTER — TELEPHONE (OUTPATIENT)
Dept: INTERNAL MEDICINE CLINIC | Facility: CLINIC | Age: 38
End: 2023-02-07

## 2023-02-07 DIAGNOSIS — R35.0 FREQUENT URINATION: Primary | ICD-10-CM

## 2023-02-07 DIAGNOSIS — Z87.440 RECENT URINARY TRACT INFECTION: ICD-10-CM

## 2023-02-07 NOTE — TELEPHONE ENCOUNTER
Patients mother called stating her daughter doesn't get better with the urine infection  She no longer feels itchy  But she keeps going to the bathroom every 15 mins to 20 min's  She said the doctor prescribed her two antibiotics  She feels like both antibiotics didn't help  She went to [employer] got a cream  She saw more improvement with cream  She is concern why she keeps going to the bathroom a lot  She said she cant even go anywhere with her  Patients mom wants to see if theres a way to put in lab works to see what's going on

## 2023-02-07 NOTE — TELEPHONE ENCOUNTER
With  #[id number], spoke to patient's mother and advised her that patient needs to have another UA culture done to check to see if the infection has been treated or not  Patient's mother stated that she brought Desitin cream for patient b/c it appeared she had a rash in the genital area and she only used it on the external part  Mom will have culture done tomorrow  Advised her once results are received we will call her with them  She understood and had no further questions or concerns

## 2023-02-07 NOTE — TELEPHONE ENCOUNTER
The last urine culture was positive for urinary tract infection due to e coli and the second antibiotic prescribed should have treated that  I would recommend repeating a urine test to ensure the infection is cleared--orders placed  What cream did she get and was it applied externally? Did it help with the urinary frequency or another symptoms such as burning/itching?

## 2023-02-08 ENCOUNTER — APPOINTMENT (OUTPATIENT)
Dept: LAB | Facility: CLINIC | Age: 38
End: 2023-02-08

## 2023-02-08 DIAGNOSIS — Z87.440 RECENT URINARY TRACT INFECTION: ICD-10-CM

## 2023-02-08 DIAGNOSIS — E06.3 HYPOTHYROIDISM DUE TO HASHIMOTO'S THYROIDITIS: ICD-10-CM

## 2023-02-08 DIAGNOSIS — E55.9 VITAMIN D INSUFFICIENCY: ICD-10-CM

## 2023-02-08 DIAGNOSIS — E03.8 HYPOTHYROIDISM DUE TO HASHIMOTO'S THYROIDITIS: ICD-10-CM

## 2023-02-08 DIAGNOSIS — R35.0 FREQUENT URINATION: ICD-10-CM

## 2023-02-08 LAB
25(OH)D3 SERPL-MCNC: 23.3 NG/ML (ref 30–100)
T4 FREE SERPL-MCNC: 0.96 NG/DL (ref 0.76–1.46)
TSH SERPL DL<=0.05 MIU/L-ACNC: 0.21 UIU/ML (ref 0.45–4.5)

## 2023-02-09 LAB — BACTERIA UR CULT: ABNORMAL

## 2023-02-11 ENCOUNTER — VBI (OUTPATIENT)
Dept: ADMINISTRATIVE | Facility: OTHER | Age: 38
End: 2023-02-11

## 2023-02-20 DIAGNOSIS — E55.9 VITAMIN D INSUFFICIENCY: ICD-10-CM

## 2023-02-20 RX ORDER — ACETAMINOPHEN 160 MG
8000 TABLET,DISINTEGRATING ORAL DAILY
Qty: 90 CAPSULE | Refills: 8
Start: 2023-02-20 | End: 2023-02-23

## 2023-02-23 DIAGNOSIS — E55.9 VITAMIN D INSUFFICIENCY: Primary | ICD-10-CM

## 2023-02-23 RX ORDER — ERGOCALCIFEROL 1.25 MG/1
50000 CAPSULE ORAL
Qty: 6 CAPSULE | Refills: 0 | Status: SHIPPED | OUTPATIENT
Start: 2023-02-23 | End: 2023-05-15

## 2023-03-19 PROBLEM — N39.0 URINARY TRACT INFECTION WITHOUT HEMATURIA: Status: RESOLVED | Noted: 2023-01-18 | Resolved: 2023-03-19

## 2023-04-27 ENCOUNTER — OFFICE VISIT (OUTPATIENT)
Dept: INTERNAL MEDICINE CLINIC | Facility: CLINIC | Age: 38
End: 2023-04-27

## 2023-04-27 VITALS
DIASTOLIC BLOOD PRESSURE: 48 MMHG | SYSTOLIC BLOOD PRESSURE: 106 MMHG | WEIGHT: 142 LBS | HEIGHT: 57 IN | BODY MASS INDEX: 30.63 KG/M2 | TEMPERATURE: 98.1 F | HEART RATE: 69 BPM

## 2023-04-27 DIAGNOSIS — Z00.00 ANNUAL PHYSICAL EXAM: ICD-10-CM

## 2023-04-27 DIAGNOSIS — E55.9 VITAMIN D INSUFFICIENCY: Primary | ICD-10-CM

## 2023-04-27 DIAGNOSIS — E78.00 PURE HYPERCHOLESTEROLEMIA: ICD-10-CM

## 2023-04-27 DIAGNOSIS — E03.8 CENTRAL HYPOTHYROIDISM: ICD-10-CM

## 2023-04-27 RX ORDER — LEVOTHYROXINE SODIUM 0.07 MG/1
75 TABLET ORAL DAILY
Qty: 90 TABLET | Refills: 1 | Status: SHIPPED | OUTPATIENT
Start: 2023-04-27

## 2023-04-27 NOTE — PROGRESS NOTES
[address] [facility] JOI    NAME: Last Gates  AGE: 40 y o  SEX: female  : 1985     DATE: 2023     Assessment and Plan:     Problem List Items Addressed This Visit        Other    Vitamin D insufficiency - Primary     Other Visit Diagnoses     Central hypothyroidism      Currently following with endocrinology  Continue levothyroxine 75 mcg daily  Relevant Medications    levothyroxine 75 mcg tablet    Pure hypercholesterolemia      Encourage diet modification limiting high-fat meals, fried food  Encourage losing weight and exercising  Annual physical exam              Immunizations and preventive care screenings were discussed with patient today  Appropriate education was printed on patient's after visit summary  Counseling:  Alcohol/drug use: discussed moderation in alcohol intake, the recommendations for healthy alcohol use, and avoidance of illicit drug use  Dental Health: discussed importance of regular tooth brushing, flossing, and dental visits  Injury prevention: discussed safety/seat belts, safety helmets, smoke detectors, carbon dioxide detectors, and smoking near bedding or upholstery  Sexual health: discussed sexually transmitted diseases, partner selection, use of condoms, avoidance of unintended pregnancy, and contraceptive alternatives  · Exercise: the importance of regular exercise/physical activity was discussed  Recommend exercise 3-5 times per week for at least 30 minutes  BMI Counseling: Body mass index is 30 73 kg/m²  The BMI is above normal  Nutrition recommendations include decreasing portion sizes, encouraging healthy choices of fruits and vegetables, limiting drinks that contain sugar, moderation in carbohydrate intake, reducing intake of saturated and trans fat and reducing intake of cholesterol  Exercise recommendations include moderate physical activity 150 minutes/week  No pharmacotherapy was ordered  Rationale for BMI follow-up plan is due to patient being overweight or obese  Depression Screening and Follow-up Plan: Patient was screened for depression during today's encounter  They screened negative with a PHQ-2 score of 0  Return in about 6 months (around 10/27/2023) for follow up chronic conditions  Chief Complaint:     Chief Complaint   Patient presents with   • Physical Exam      History of Present Illness:     Adult Annual Physical   Patient here for a comprehensive physical exam  The patient reports no problems  Diet and Physical Activity  · Diet/Nutrition: well balanced diet, limited junk food, low fat diet and limited fruits/vegetables  · Exercise: no formal exercise  Depression Screening  PHQ-2/9 Depression Screening    Little interest or pleasure in doing things: 0 - not at all  Feeling down, depressed, or hopeless: 0 - not at all  PHQ-2 Score: 0  PHQ-2 Interpretation: Negative depression screen       General Health  · Sleep: sleeps well and gets more than 8 hours of sleep on average  · Hearing: normal - bilateral   · Vision: no vision problems and goes for regular eye exams  · Dental: regular dental visits and brushes teeth twice daily  /GYN Health  · Last menstrual period: 4/26/23     Review of Systems:     Review of Systems   Constitutional: Negative for chills and fever  HENT: Negative for ear pain and sore throat  Eyes: Negative for pain and visual disturbance  Respiratory: Negative for cough and shortness of breath  Cardiovascular: Negative for chest pain and palpitations  Gastrointestinal: Negative for abdominal pain and vomiting  Genitourinary: Negative for dysuria and hematuria  Musculoskeletal: Negative for arthralgias and back pain  Skin: Negative for color change and rash  Neurological: Negative for seizures and syncope  All other systems reviewed and are negative       Past Medical History:     Past Medical History:   Diagnosis Date   • Learning disability     Mental capacity of an 7year old    • Seizure ([facility] [address] )    • Thyroid condition       Past Surgical History:     Past Surgical History:   Procedure Laterality Date   • NO PAST SURGERIES        Social History:     Social History     Socioeconomic History   • Marital status: Single     Spouse name: None   • Number of children: 0   • Years of education: None   • Highest education level: None   Occupational History   • None   Tobacco Use   • Smoking status: Never   • Smokeless tobacco: Never   Vaping Use   • Vaping Use: Never used   Substance and Sexual Activity   • Alcohol use: Never   • Drug use: Never   • Sexual activity: Never   Other Topics Concern   • None   Social History Narrative    Caffeine use    Mental disability but able to perform unskilled work    Caregiver: nurses aide\    Sedentary lifestyle     Social Determinants of Health     Financial Resource Strain: Low Risk    • Difficulty of Paying Living Expenses: Not hard at all   Food Insecurity: No Food Insecurity   • Worried About [address] in the Last Year: Never true   • Ran Out of Food in the Last Year: Never true   Transportation Needs: No Transportation Needs   • Lack of Transportation (Medical): No   • Lack of Transportation (Non-Medical):  No   Physical Activity: Not on file   Stress: Not on file   Social Connections: Not on file   Intimate Partner Violence: Not on file   Housing Stability: Low Risk    • Unable to Pay for Housing in the Last Year: No   • Number of Places Lived in the Last Year: 1   • Unstable Housing in the Last Year: No      Family History:     Family History   Problem Relation Age of Onset   • Migraines Mother    • No Known Problems Father       Current Medications:     Current Outpatient Medications   Medication Sig Dispense Refill   • carbamazepine (CARBATROL) 100 MG 12 hr capsule Take 1 capsule (100 mg total) by mouth 2 (two) times a day 180 capsule 3   • "ergocalciferol (VITAMIN D2) 50,000 units Take 1 capsule (50,000 Units total) by mouth every 14 (fourteen) days 6 capsule 0   • fluticasone (FLONASE) 50 mcg/act nasal spray 2 sprays into each nostril daily 16 g 1   • folic acid (FOLVITE) 1 mg tablet take 1 tablet by mouth once daily 30 tablet 11   • levothyroxine 75 mcg tablet Take 1 tablet (75 mcg total) by mouth daily 90 tablet 1   • loratadine (CLARITIN) 10 mg tablet take 1 tablet by mouth once daily 90 tablet 3   • multivitamin (THERAGRAN) TABS Take 1 tablet by mouth daily     • omeprazole (PriLOSEC) 20 mg delayed release capsule take 1 capsule by mouth once daily (Patient taking differently: if needed) 90 capsule 3   • phenazopyridine (PYRIDIUM) 100 mg tablet Take 1 tablet (100 mg total) by mouth 3 (three) times a day as needed for bladder spasms 10 tablet 0     No current facility-administered medications for this visit  Allergies: Allergies   Allergen Reactions   • Contrast [Iodinated Contrast Media]    • Dust Mite Extract    • Nickel    • Penicillins    • Pollen Extract    • Short Ragweed Pollen Ext       Physical Exam:     BP (!) 106/48 (BP Location: Left arm, Patient Position: Sitting, Cuff Size: Large)   Pulse 69   Temp 98 1 °F (36 7 °C) (Temporal)   Ht 4' 9\" (1 448 m)   Wt 64 4 kg (142 lb)   BMI 30 73 kg/m²     Physical Exam  Vitals and nursing note reviewed  Constitutional:       General: She is not in acute distress  Appearance: She is well-developed  HENT:      Head: Normocephalic and atraumatic  Eyes:      Conjunctiva/sclera: Conjunctivae normal    Cardiovascular:      Rate and Rhythm: Normal rate and regular rhythm  Heart sounds: No murmur heard  Pulmonary:      Effort: Pulmonary effort is normal  No respiratory distress  Breath sounds: Normal breath sounds  Abdominal:      Palpations: Abdomen is soft  Tenderness: There is no abdominal tenderness  Musculoskeletal:         General: No swelling        Cervical " back: Neck supple  Skin:     General: Skin is warm and dry  Capillary Refill: Capillary refill takes less than 2 seconds  Neurological:      Mental Status: She is alert  Mental status is at baseline     Psychiatric:         Mood and Affect: Mood normal           Drew Aldridge MD   [address]

## 2023-05-05 DIAGNOSIS — G40.909 NONINTRACTABLE EPILEPSY WITHOUT STATUS EPILEPTICUS, UNSPECIFIED EPILEPSY TYPE (HCC): ICD-10-CM

## 2023-05-05 RX ORDER — FOLIC ACID 1 MG/1
TABLET ORAL
Qty: 30 TABLET | Refills: 11 | Status: SHIPPED | OUTPATIENT
Start: 2023-05-05

## 2023-05-14 DIAGNOSIS — E55.9 VITAMIN D INSUFFICIENCY: ICD-10-CM

## 2023-05-15 RX ORDER — ERGOCALCIFEROL 1.25 MG/1
CAPSULE ORAL
Qty: 6 CAPSULE | Refills: 0 | Status: SHIPPED | OUTPATIENT
Start: 2023-05-15

## 2023-05-15 NOTE — TELEPHONE ENCOUNTER
Requested medication(s) are due for refill today: Yes  Patient has already received a courtesy refill: No  Other reason request has been forwarded to provider: not sure if she should still be on prescription strength Vit D

## 2023-05-31 ENCOUNTER — OFFICE VISIT (OUTPATIENT)
Dept: NEUROLOGY | Facility: CLINIC | Age: 38
End: 2023-05-31

## 2023-05-31 VITALS
WEIGHT: 141.3 LBS | HEART RATE: 74 BPM | DIASTOLIC BLOOD PRESSURE: 73 MMHG | BODY MASS INDEX: 30.58 KG/M2 | TEMPERATURE: 98.3 F | SYSTOLIC BLOOD PRESSURE: 109 MMHG

## 2023-05-31 DIAGNOSIS — G40.909 NONINTRACTABLE EPILEPSY WITHOUT STATUS EPILEPTICUS, UNSPECIFIED EPILEPSY TYPE (HCC): Primary | ICD-10-CM

## 2023-05-31 DIAGNOSIS — T42.75XA ANTICONVULSANT DRUG-INDUCED BONE SOFTENING: ICD-10-CM

## 2023-05-31 DIAGNOSIS — E55.9 VITAMIN D INSUFFICIENCY: ICD-10-CM

## 2023-05-31 DIAGNOSIS — M83.5 ANTICONVULSANT DRUG-INDUCED BONE SOFTENING: ICD-10-CM

## 2023-05-31 RX ORDER — CARBAMAZEPINE 100 MG/1
100 CAPSULE, EXTENDED RELEASE ORAL DAILY
Qty: 90 CAPSULE | Refills: 1 | Status: SHIPPED | OUTPATIENT
Start: 2023-05-31

## 2023-05-31 NOTE — PATIENT INSTRUCTIONS
Decrease carbamazepine to 100 mg once per day  Have DXA scan (bone density scan)  Have blood work done  Call the office with seizures or concerns  Follow up in 3-4 months with Dr Kellie Mark

## 2023-05-31 NOTE — ASSESSMENT & PLAN NOTE
Patient with history focal epilepsy based on prior EEG in 2016 with left centro-temporal epileptiform discharges maintained on Carbatrol 300 mg twice per day for many years  Most recent EEG was normal to determine if medication weaning and discontinuation could be attempted given she has been seizure free for over 20 years  Weaning was started and is currently on Carbatrol 100 mg BID without recurrent seizures  Neurologic exam nonfocal except for intellectual disabilities  Discussed that even though the EEG was normal, there is still a chance that she could have a breakthrough seizure  Patient and mother do understand this and are now having concerns that breakthrough seizures could occur  While they are not agreeable to completely stop this medication, they would like to decrease to 100 mg daily to see how she does  Since patient may not be discontinuing medication in the near future, recommend she has a DXA scan done given long term use of this medication  Plan to also check vitamin D level which can contribute to bone softening on this medication  Will also check CBC and CMP  If DXA is abnormal or if there are breakthrough seizures, consider alternative medication such as levetiracetam      Discussed that with any episodes concerning for seizures, mother should call the office right away  Follow up in 3 months or sooner if needed with Dr Tobias Abel

## 2023-05-31 NOTE — PROGRESS NOTES
Review of Systems   Constitutional: Negative  Negative for appetite change and fever  HENT: Negative  Negative for hearing loss, tinnitus, trouble swallowing and voice change  Eyes: Negative  Negative for photophobia, pain and visual disturbance  Respiratory: Negative  Negative for shortness of breath  Cardiovascular: Negative  Negative for palpitations  Gastrointestinal: Positive for nausea (when she eats a lot at night she will get nausea/vomit) and vomiting  Endocrine: Negative  Negative for cold intolerance  Genitourinary: Negative  Negative for dysuria, frequency and urgency  Musculoskeletal: Positive for gait problem (shuffling when walking )  Negative for myalgias and neck pain  Skin: Negative  Negative for rash  Allergic/Immunologic: Negative  Neurological: Negative for dizziness, tremors, seizures, syncope, facial asymmetry, speech difficulty, weakness, light-headedness, numbness and headaches  Hematological: Negative  Does not bruise/bleed easily  Psychiatric/Behavioral: Negative  Negative for confusion, hallucinations and sleep disturbance  All other systems reviewed and are negative

## 2023-05-31 NOTE — PROGRESS NOTES
Patient ID: Saul Ingram is a 40 y o  female with a PMH of intellectual disability and focal epilepsy (past EEGs found left centrotemporal epileptiform discharges, but no recurrent seizure for nearly 20 years, and most recent was normal), who is returning to Neurology office for follow up of her seizures  Assessment/Plan:    Epilepsy [facility])  Patient with history focal epilepsy based on prior EEG in 2016 with left centro-temporal epileptiform discharges maintained on Carbatrol 300 mg twice per day for many years  Most recent EEG was normal to determine if medication weaning and discontinuation could be attempted given she has been seizure free for over 20 years  Weaning was started and is currently on Carbatrol 100 mg BID without recurrent seizures  Neurologic exam nonfocal except for intellectual disabilities  Discussed that even though the EEG was normal, there is still a chance that she could have a breakthrough seizure  Patient and mother do understand this and are now having concerns that breakthrough seizures could occur  While they are not agreeable to completely stop this medication, they would like to decrease to 100 mg daily to see how she does  Since patient may not be discontinuing medication in the near future, recommend she has a DXA scan done given long term use of this medication  Plan to also check vitamin D level which can contribute to bone softening on this medication  Will also check CBC and CMP  If DXA is abnormal or if there are breakthrough seizures, consider alternative medication such as levetiracetam      Discussed that with any episodes concerning for seizures, mother should call the office right away  Follow up in 3 months or sooner if needed with Dr Sregio Vasquez  Vitamin D insufficiency  Check level  Anticonvulsant drug-induced bone softening  At risk for bone softening due to long term carbamazepine use  Check DXA           She will Return in about 3 months (around 2023) for Follow up with Dr Vaibhav Rae  Subjective:  Juana Gray is a 40 y o  female with a PMH of intellectual disability and focal epilepsy (past EEGs found left centrotemporal epileptiform discharges, but no recurrent seizure for nearly 20 years, and most recent was normal), who is returning to Neurology office for follow up of her seizures  She was last seen in the office by Dr Vaibhav Rae on 23  At that time, they were slowly weaning off of carbatrol  If she was still seizure free at 4 month follow up, recommended weaning completely off of medication at that time  Since their last visit, she continues to be seizure free weaning off of carbamazepine  Currently on 100 mg BID  No issues with weaning  Mother is concerned to discontinue medicaiton due to risk of breakthrough seizure  Last DXA normal in   On high dose vitamin D every 14 days     : Kaila Alicia #[id number]    Current seizure medications:  - carbamazepine 497 mg BID  - folic acid 1 mg daily  Other medications as per Epic  Seizure semiology:  1   Fatigue then generalized convulsion     Woman of childbearing age with Epilepsy:  Contraception: not sexually active  Folic acid supplement:  No     Special Features  Status epilepticus: No  Self Injury Seizures: No  Precipitating Factors: None     Epilepsy Risk Factors:  Abnormal pregnancy: No  Abnormal birth/: Full term but small for gestational age (4 5lbs)  Abnormal Development: Walking at [age] years  Meherrin Fines did not develop until [age] years, attention deficit, she needed speech therapy  Febrile seizures, simple: No  Febrile seizures, complex: No  CNS infection: No  Mental retardation: Learning disability  Cerebral palsy: No  Head injury (moderate/severe): Yes  CNS neoplasm: No  CNS malformation: No  Neurosurgical procedure: No  Stroke: No  Alcohol abuse: No  Drug abuse: No  Family history Sz/epilepsy: No     Prior AEDs:  Luminal  Tegretol Syrup  Carbamazepine/Carbatrol (transitioned off to minimize the risk of osteoporosis)  Levetiracetam (weight gain, irritability)  Lamotrigine (depression, irritability)     Prior workup:  Imaging:  MRI brain 3/24/2008  [id number] T2 hyperintense focus within the subcortical white matter of the lateral aspect of the left midfrontal lobe (may be seen in migraine headaches)     5/25/2017 - MRI brain  No intracranial pathology  Mild left anterior ethmoid and maxillary sinus disease     EEGs:  3/21/2008 Dr Damián Killian  6-7 Hz PDR  Bifrontal sharp and spike activity  Generalized spike-and-wave activity with frontal predominance     11/4-5/2015 24 hours ambulatory EEG  Normal 24 hours ambulatory EEG  Patient event button of dizziness (twice) shows no change to the awake background EEG     07/08/2016 >1 hour EEG  Rare presence of focal spike-slow waves over the left central-temporal region     8/30/2022 Routine EEG  Normal EEG     Her history was also obtained from her mother, who was present at today's visit  I reviewed prior neurology notes, most recent labs, as documented in Epic/[employer], and summarized above  Objective:    Blood pressure 109/73, pulse 74, temperature 98 3 °F (36 8 °C), weight 64 1 kg (141 lb 4 8 oz)  Physical Exam  No apparent distress  Appears well nourished  Mood appropriate for situation     Neurologic Exam  Mental status- alert and oriented to person, place, and time  Speech appropriate for conversation  Limited attention and understanding of medical situation  Cranial Nerves- PERRL, EOMS normal, facial sensation and muscles symmetric, hearing intact bilaterally to finger rubs, tongue midline, palate rise symmetrical, shoulder shrug symmetrical     Motor- No pronator drift  Appropriate strength  Moves all extremities freely  No tremor  Sensory-  Intact distally in all extremities to light touch  DTRs- 2+ and symmetric in all extremities  Gait- normal casual gait  Coordination- FNF intact  ROS:  Review of Systems   Constitutional: Negative  Negative for appetite change and fever  HENT: Negative  Negative for hearing loss, tinnitus, trouble swallowing and voice change  Eyes: Negative  Negative for photophobia, pain and visual disturbance  Respiratory: Negative  Negative for shortness of breath  Cardiovascular: Negative  Negative for palpitations  Gastrointestinal: Positive for nausea (when she eats a lot at night she will get nausea/vomit) and vomiting  Endocrine: Negative  Negative for cold intolerance  Genitourinary: Negative  Negative for dysuria, frequency and urgency  Musculoskeletal: Positive for gait problem (shuffling when walking )  Negative for myalgias and neck pain  Skin: Negative  Negative for rash  Allergic/Immunologic: Negative  Neurological: Negative for dizziness, tremors, seizures, syncope, facial asymmetry, speech difficulty, weakness, light-headedness, numbness and headaches  Hematological: Negative  Does not bruise/bleed easily  Psychiatric/Behavioral: Negative  Negative for confusion, hallucinations and sleep disturbance  All other systems reviewed and are negative  ROS obtained by MA and reviewed by myself  This note may have been created using voice recognition software  There may be unintentional errors such as grammatical errors, spelling errors, or pronoun errors

## 2023-06-13 ENCOUNTER — TELEPHONE (OUTPATIENT)
Dept: INTERNAL MEDICINE CLINIC | Facility: CLINIC | Age: 38
End: 2023-06-13

## 2023-06-13 NOTE — TELEPHONE ENCOUNTER
Patient's mother, Kristan, called stating that she needs a letter for the [facility] explaining why a water filter is necessary  Per Kristan, patient needs a water filter for all faucets in her apartment because patient has a lot of allergies and the filters help minimize her reactions  Kristan will  the letter once completed

## 2023-08-02 ENCOUNTER — HOSPITAL ENCOUNTER (OUTPATIENT)
Dept: BONE DENSITY | Facility: MEDICAL CENTER | Age: 38
Discharge: HOME | End: 2023-08-02
Payer: COMMERCIAL

## 2023-08-02 DIAGNOSIS — E55.9 VITAMIN D INSUFFICIENCY: ICD-10-CM

## 2023-08-02 DIAGNOSIS — T42.75XA ANTICONVULSANT DRUG-INDUCED BONE SOFTENING: ICD-10-CM

## 2023-08-02 DIAGNOSIS — M83.5 ANTICONVULSANT DRUG-INDUCED BONE SOFTENING: ICD-10-CM

## 2023-08-02 PROCEDURE — 77080 DXA BONE DENSITY AXIAL: CPT

## 2023-08-04 DIAGNOSIS — E55.9 VITAMIN D INSUFFICIENCY: ICD-10-CM

## 2023-08-07 ENCOUNTER — LAB (OUTPATIENT)
Dept: LAB | Facility: CLINIC | Age: 38
End: 2023-08-07
Payer: COMMERCIAL

## 2023-08-07 DIAGNOSIS — E55.9 VITAMIN D INSUFFICIENCY: ICD-10-CM

## 2023-08-07 DIAGNOSIS — G40.909 NONINTRACTABLE EPILEPSY WITHOUT STATUS EPILEPTICUS, UNSPECIFIED EPILEPSY TYPE (HCC): ICD-10-CM

## 2023-08-07 DIAGNOSIS — E03.8 CENTRAL HYPOTHYROIDISM: ICD-10-CM

## 2023-08-07 DIAGNOSIS — E78.00 PURE HYPERCHOLESTEROLEMIA: ICD-10-CM

## 2023-08-07 LAB
25(OH)D3 SERPL-MCNC: 31 NG/ML (ref 30–100)
ALBUMIN SERPL BCP-MCNC: 3.8 G/DL (ref 3.5–5)
ALP SERPL-CCNC: 80 U/L (ref 46–116)
ALT SERPL W P-5'-P-CCNC: 22 U/L (ref 12–78)
ANION GAP SERPL CALCULATED.3IONS-SCNC: 5 MMOL/L
AST SERPL W P-5'-P-CCNC: 16 U/L (ref 5–45)
BASOPHILS # BLD AUTO: 0.05 THOUSANDS/ÂΜL (ref 0–0.1)
BASOPHILS NFR BLD AUTO: 1 % (ref 0–1)
BILIRUB SERPL-MCNC: 0.49 MG/DL (ref 0.2–1)
BUN SERPL-MCNC: 9 MG/DL (ref 5–25)
CALCIUM SERPL-MCNC: 9 MG/DL (ref 8.3–10.1)
CHLORIDE SERPL-SCNC: 109 MMOL/L (ref 96–108)
CHOLEST SERPL-MCNC: 204 MG/DL
CO2 SERPL-SCNC: 25 MMOL/L (ref 21–32)
CREAT SERPL-MCNC: 0.63 MG/DL (ref 0.6–1.3)
EOSINOPHIL # BLD AUTO: 0.12 THOUSAND/ÂΜL (ref 0–0.61)
EOSINOPHIL NFR BLD AUTO: 2 % (ref 0–6)
ERYTHROCYTE [DISTWIDTH] IN BLOOD BY AUTOMATED COUNT: 13.8 % (ref 11.6–15.1)
GFR SERPL CREATININE-BSD FRML MDRD: 115 ML/MIN/1.73SQ M
GLUCOSE P FAST SERPL-MCNC: 88 MG/DL (ref 65–99)
HCT VFR BLD AUTO: 40 % (ref 34.8–46.1)
HDLC SERPL-MCNC: 69 MG/DL
HGB BLD-MCNC: 13.5 G/DL (ref 11.5–15.4)
IMM GRANULOCYTES # BLD AUTO: 0.01 THOUSAND/UL (ref 0–0.2)
IMM GRANULOCYTES NFR BLD AUTO: 0 % (ref 0–2)
LDLC SERPL CALC-MCNC: 122 MG/DL (ref 0–100)
LYMPHOCYTES # BLD AUTO: 1.91 THOUSANDS/ÂΜL (ref 0.6–4.47)
LYMPHOCYTES NFR BLD AUTO: 34 % (ref 14–44)
MCH RBC QN AUTO: 30.3 PG (ref 26.8–34.3)
MCHC RBC AUTO-ENTMCNC: 33.8 G/DL (ref 31.4–37.4)
MCV RBC AUTO: 90 FL (ref 82–98)
MONOCYTES # BLD AUTO: 0.55 THOUSAND/ÂΜL (ref 0.17–1.22)
MONOCYTES NFR BLD AUTO: 10 % (ref 4–12)
NEUTROPHILS # BLD AUTO: 3.05 THOUSANDS/ÂΜL (ref 1.85–7.62)
NEUTS SEG NFR BLD AUTO: 53 % (ref 43–75)
NONHDLC SERPL-MCNC: 135 MG/DL
NRBC BLD AUTO-RTO: 0 /100 WBCS
PLATELET # BLD AUTO: 309 THOUSANDS/UL (ref 149–390)
PMV BLD AUTO: 9.5 FL (ref 8.9–12.7)
POTASSIUM SERPL-SCNC: 3.6 MMOL/L (ref 3.5–5.3)
PROT SERPL-MCNC: 7.5 G/DL (ref 6.4–8.4)
RBC # BLD AUTO: 4.46 MILLION/UL (ref 3.81–5.12)
SODIUM SERPL-SCNC: 139 MMOL/L (ref 135–147)
T4 FREE SERPL-MCNC: 0.76 NG/DL (ref 0.61–1.12)
TRIGL SERPL-MCNC: 63 MG/DL
WBC # BLD AUTO: 5.69 THOUSAND/UL (ref 4.31–10.16)

## 2023-08-07 PROCEDURE — 84443 ASSAY THYROID STIM HORMONE: CPT

## 2023-08-07 PROCEDURE — 84439 ASSAY OF FREE THYROXINE: CPT

## 2023-08-07 PROCEDURE — 82306 VITAMIN D 25 HYDROXY: CPT

## 2023-08-07 PROCEDURE — 80053 COMPREHEN METABOLIC PANEL: CPT

## 2023-08-07 PROCEDURE — 80061 LIPID PANEL: CPT

## 2023-08-07 PROCEDURE — 36415 COLL VENOUS BLD VENIPUNCTURE: CPT

## 2023-08-07 PROCEDURE — 85025 COMPLETE CBC W/AUTO DIFF WBC: CPT

## 2023-08-07 RX ORDER — ERGOCALCIFEROL 1.25 MG/1
CAPSULE ORAL
Qty: 6 CAPSULE | Refills: 0 | Status: SHIPPED | OUTPATIENT
Start: 2023-08-07

## 2023-08-08 ENCOUNTER — CONSULT (OUTPATIENT)
Dept: MULTI SPECIALTY CLINIC | Facility: CLINIC | Age: 38
End: 2023-08-08

## 2023-08-08 ENCOUNTER — TELEPHONE (OUTPATIENT)
Dept: ENDOCRINOLOGY | Facility: CLINIC | Age: 38
End: 2023-08-08

## 2023-08-08 VITALS
HEART RATE: 71 BPM | OXYGEN SATURATION: 98 % | TEMPERATURE: 98.6 F | DIASTOLIC BLOOD PRESSURE: 71 MMHG | BODY MASS INDEX: 30.17 KG/M2 | WEIGHT: 139.4 LBS | SYSTOLIC BLOOD PRESSURE: 107 MMHG

## 2023-08-08 DIAGNOSIS — E66.9 OBESITY (BMI 30-39.9): ICD-10-CM

## 2023-08-08 DIAGNOSIS — E03.8 CENTRAL HYPOTHYROIDISM: ICD-10-CM

## 2023-08-08 DIAGNOSIS — E03.8 CENTRAL HYPOTHYROIDISM: Primary | ICD-10-CM

## 2023-08-08 DIAGNOSIS — E55.9 VITAMIN D INSUFFICIENCY: ICD-10-CM

## 2023-08-08 LAB — TSH SERPL DL<=0.05 MIU/L-ACNC: 0.2 UIU/ML (ref 0.45–4.5)

## 2023-08-08 PROCEDURE — 99214 OFFICE O/P EST MOD 30 MIN: CPT | Performed by: PHYSICIAN ASSISTANT

## 2023-08-08 RX ORDER — LEVOTHYROXINE SODIUM 0.07 MG/1
75 TABLET ORAL DAILY
Qty: 90 TABLET | Refills: 1 | Status: SHIPPED | OUTPATIENT
Start: 2023-08-08

## 2023-08-08 NOTE — TELEPHONE ENCOUNTER
With  #[id number] advised patient to have repeat labs done in a month. Patient understood and had no questions.

## 2023-08-08 NOTE — PROGRESS NOTES
Patient Progress Note    CC: hypothyroidism   racom: [id number]    Referring Provider  Harini Jerez, Do  [address] [facility]   [address] [place] [id number]     History of Present Illness:     Patient is a 40year old female here for evaluation of hypothyroidism. She has a PMH of developmental delay, epilepsy, migraines. She has had hypothyroidism since 2020. Mother notes that she brought the patient to the PCP for weight gain, which is what prompted the thyroid test. Her TSH and free T4 have been low in the past which is suggestive of central hypothyroidism. She has had an MRI in 2017 which did not show significant abnormalities. She is currently on levothyroxine 75 mcg 1 tablet daily. Patient is taking it 1 hr before breakfast on an empty stomach and at least 4 hrs apart from supplements. Tolerating medication well. No history of external radiation to head/neck/chest. Her mother's aunt had thyroid cancer. No recent Iodine loading in form of medication, biotin or kelp supplements or radiological diagnostic studies. She does have a history of a fall which resulted in a head injury at age 4. Per mother seizures started occurring after that fall. Her menstrual cycles are normal.  Most recent TSH is pending and free T4 is low normal at 0.76. She does try to eat healthy and stays active but no other exercise. Vitamin D deficiency: patient is taking vitamin D2 99370 units every other week. Vitamin D is normal at 31.0. Patient Active Problem List   Diagnosis   • Allergic rhinitis   • Anticonvulsant drug-induced bone softening   • Cognitive developmental delay   • Epilepsy ([address])   • Headache, variant migraine   • 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   • Vitamin D insufficiency   • Overweight (BMI 25.0-29. 9)   • Weight gain   • Hypothyroidism due to Hashimoto's thyroiditis   • Acne vulgaris   • Vertiginous migraine   • Decay, teeth   • Gingivitis   • Obesity (BMI 30-39. 9)     Past Medical History:   Diagnosis Date   • Learning disability     Mental capacity of an 7year old    • Seizure ([address])    • Thyroid condition       Past Surgical History:   Procedure Laterality Date   • NO PAST SURGERIES        Family History   Problem Relation Age of Onset   • Migraines Mother    • No Known Problems Father      Social History     Tobacco Use   • Smoking status: Never   • Smokeless tobacco: Never   Substance Use Topics   • Alcohol use: Never     Allergies   Allergen Reactions   • Contrast [Iodinated Contrast Media]    • Dust Mite Extract    • Nickel    • Penicillins    • Pollen Extract    • Short Ragweed Pollen Ext      Current Outpatient Medications   Medication Sig Dispense Refill   • carbamazepine (CARBATROL) 100 MG 12 hr capsule Take 1 capsule (100 mg total) by mouth in the morning 90 capsule 1   • ergocalciferol (VITAMIN D2) 50,000 units TAKE 1 CAPSULE BY MOUTH EVERY 14 DAYS 6 capsule 0   • fluticasone (FLONASE) 50 mcg/act nasal spray 2 sprays into each nostril daily 16 g 1   • folic acid (FOLVITE) 1 mg tablet take 1 tablet by mouth once daily 30 tablet 11   • levothyroxine 75 mcg tablet Take 1 tablet (75 mcg total) by mouth daily 90 tablet 1   • loratadine (CLARITIN) 10 mg tablet take 1 tablet by mouth once daily 90 tablet 3   • multivitamin (THERAGRAN) TABS Take 1 tablet by mouth daily     • omeprazole (PriLOSEC) 20 mg delayed release capsule take 1 capsule by mouth once daily (Patient taking differently: if needed) 90 capsule 3   • phenazopyridine (PYRIDIUM) 100 mg tablet Take 1 tablet (100 mg total) by mouth 3 (three) times a day as needed for bladder spasms 10 tablet 0     No current facility-administered medications for this visit. Review of Systems   Constitutional: Positive for fatigue (chronic). Negative for activity change, appetite change and unexpected weight change. HENT: Negative for trouble swallowing. Eyes: Negative for visual disturbance.    Respiratory: Negative for shortness of breath. Cardiovascular: Negative for chest pain and palpitations. Gastrointestinal: Negative for constipation and diarrhea. Endocrine: Negative for cold intolerance and heat intolerance. Musculoskeletal: Negative. Skin: Negative. Neurological: Negative for tremors. Psychiatric/Behavioral: Negative. Physical Exam:  Body mass index is 30.17 kg/m². /71 (BP Location: Right arm, Patient Position: Sitting, Cuff Size: Standard)   Pulse 71   Temp 98.6 °F (37 °C) (Temporal)   Wt 63.2 kg (139 lb 6.4 oz)   SpO2 98%   BMI 30.17 kg/m²    Wt Readings from Last 3 Encounters:   08/08/23 63.2 kg (139 lb 6.4 oz)   05/31/23 64.1 kg (141 lb 4.8 oz)   04/27/23 64.4 kg (142 lb)       Physical Exam  Vitals and nursing note reviewed. Constitutional:       Appearance: She is well-developed. HENT:      Head: Normocephalic. Eyes:      General: No scleral icterus. Pupils: Pupils are equal, round, and reactive to light. Neck:      Thyroid: No thyromegaly. Cardiovascular:      Rate and Rhythm: Normal rate and regular rhythm. Pulses:           Radial pulses are 2+ on the right side and 2+ on the left side. Heart sounds: No murmur heard. Pulmonary:      Effort: Pulmonary effort is normal. No respiratory distress. Breath sounds: Normal breath sounds. No wheezing. Musculoskeletal:      Cervical back: Neck supple. Skin:     General: Skin is warm and dry. Neurological:      Mental Status: She is alert. Deep Tendon Reflexes: Reflexes are normal and symmetric. Patient's shoes and socks were not removed.           Labs:   No results found for: "HGBA1C"    Lab Results   Component Value Date    HDL 69 08/07/2023    TRIG 63 08/07/2023       Lab Results   Component Value Date    GLUCOSE 85 01/07/2016    CALCIUM 9.0 08/07/2023     01/19/2017    K 3.6 08/07/2023    CO2 25 08/07/2023     (H) 08/07/2023    BUN 9 08/07/2023    CREATININE 0.63 08/07/2023 eGFR   Date Value Ref Range Status   08/07/2023 115 ml/min/1.73sq m Final       Lab Results   Component Value Date    ALT 22 08/07/2023    AST 16 08/07/2023    ALKPHOS 80 08/07/2023    BILITOT 0.3 01/19/2017       Lab Results   Component Value Date    PIR1HNBZEGQG 0.214 (L) 02/08/2023    TSH 0.34 (L) 10/05/2022    E2XQEHO 1.20 05/11/2021    P5TNZZQ 4.6 (L) 09/10/2021           Plan:    Brennen Hernandez was seen today for follow-up. Diagnoses and all orders for this visit:    Central hypothyroidism  TSH is pending but free T4 is normal at 0.76  Expecting TSH to be low considering hx of central hypothyroidism. For now continue current treatment as free T4 has been in acceptable range in the past. Goal free T4 is 0.9-1.0. She denies any change in symptoms. Will repeat TFTs in 1 month and if free T4 remains below goal, will need to increase levothyroxine dose. -     T4, free; Future  -     TSH, 3rd generation; Future    Vitamin D insufficiency  Vitamin D improved to 31.0  Continue current supplementation  -     Vitamin D 25 hydroxy; Future    Obesity  Recommended healthy diet and routine exercise as tolerated      Discussed with the patient and all questions fully answered. She will call me if any problems arise.     Counseled patient on diagnostic results, prognosis, risk and benefit of treatment options, instruction for management, importance of treatment compliance, risk  factor reduction and impressions    Ginette TANNER [address] [facility] ENDOCRINOLOGY

## 2023-08-08 NOTE — TELEPHONE ENCOUNTER
Please call patient to notify her that due to low normal free T4. We can repeat thyroid labs again in 1 month. I did put in orders. The goal is to the keep the free T4 around 0.9-1.0.

## 2023-08-15 ENCOUNTER — TELEPHONE (OUTPATIENT)
Dept: NEUROLOGY | Facility: CLINIC | Age: 38
End: 2023-08-15

## 2023-08-15 NOTE — TELEPHONE ENCOUNTER
----- Message from Aguila Latham, [address] [place] [address] sent at 8/14/2023  4:39 PM EDT -----  Normal DXA  ----- Message -----  From: Trang, Radiology Results In  Sent: 8/10/2023  12:50 PM EDT  To: Astrid Grullon

## 2023-09-29 ENCOUNTER — VBI (OUTPATIENT)
Dept: ADMINISTRATIVE | Facility: OTHER | Age: 38
End: 2023-09-29

## 2023-09-29 ENCOUNTER — OFFICE VISIT (OUTPATIENT)
Dept: NEUROLOGY | Facility: CLINIC | Age: 38
End: 2023-09-29
Payer: COMMERCIAL

## 2023-09-29 VITALS
TEMPERATURE: 97.2 F | HEART RATE: 77 BPM | BODY MASS INDEX: 30.29 KG/M2 | WEIGHT: 140.4 LBS | SYSTOLIC BLOOD PRESSURE: 130 MMHG | HEIGHT: 57 IN | DIASTOLIC BLOOD PRESSURE: 63 MMHG

## 2023-09-29 DIAGNOSIS — G40.909 NONINTRACTABLE EPILEPSY WITHOUT STATUS EPILEPTICUS, UNSPECIFIED EPILEPSY TYPE (HCC): ICD-10-CM

## 2023-09-29 DIAGNOSIS — E55.9 VITAMIN D INSUFFICIENCY: ICD-10-CM

## 2023-09-29 PROCEDURE — 99213 OFFICE O/P EST LOW 20 MIN: CPT | Performed by: STUDENT IN AN ORGANIZED HEALTH CARE EDUCATION/TRAINING PROGRAM

## 2023-09-29 RX ORDER — CARBAMAZEPINE 100 MG/1
100 CAPSULE, EXTENDED RELEASE ORAL DAILY
Qty: 90 CAPSULE | Refills: 3 | Status: SHIPPED | OUTPATIENT
Start: 2023-09-29

## 2023-09-29 RX ORDER — ERGOCALCIFEROL 1.25 MG/1
50000 CAPSULE ORAL
Qty: 26 CAPSULE | Refills: 0 | Status: SHIPPED | OUTPATIENT
Start: 2023-09-29

## 2023-09-29 NOTE — PROGRESS NOTES
[facility] -   Follow up appointment    Impression/Plan    Pilar Quigley is a 40 y.o. female with a PMH of intellectual disability and focal epilepsy (past EEGs found left centrotemporal epileptiform discharges, but no recurrent seizure for nearly 20 years, and most recent was normal), who is returning to Neurology office for follow up of her seizures. She remains seizure free on a subtherapeutic dose of carbitrol. Despite that, her mother would like to continue this therapy. Plan outlined below:    #Epilepsy  - carbitrol 100 mg daily     - Follow up in 1 year    We discussed the pathophysiology of epilepsy/seizure and seizure safety/precautions including driving restrictions following seizures. We discussed factors that can lower seizure threshold and the side effects of antiepileptic medications. Diagnoses and all orders for this visit:    Nonintractable epilepsy without status epilepticus, unspecified epilepsy type ([address])  -     carbamazepine (CARBATROL) 100 MG 12 hr capsule; Take 1 capsule (100 mg total) by mouth in the morning    Vitamin D insufficiency  -     ergocalciferol (VITAMIN D2) 50,000 units; Take 1 capsule (50,000 Units total) by mouth every 14 (fourteen) days        Subjective    Pilar Quigley is a 40 y.o. female with a PMH of intellectual disability and focal epilepsy (past EEGs found left centrotemporal epileptiform discharges, but no recurrent seizure for nearly 20 years, and most recent was normal), who is returning to Neurology office for follow up of her seizures. For review:     Patient with history focal epilepsy based on prior EEG in 2016 with left centro-temporal epileptiform discharges maintained on Carbatrol 300 mg twice per day for many years. Most recent EEG was normal to determine if medication weaning and discontinuation could be attempted given she has been seizure free for over 20 years. Weaning was started without recurrent seizures.   Neurologic exam nonfocal except for intellectual disabilities.     She was last seen in the office on 2023. Plan at that time was to decrease to 100 mg daily to see how she does with the goal to eventually stop the Carbatrol altogether.      Interval history:    Since last seen, the patient has been doing well. There have been no seizures since the last appointment. The patients AEDs were being tolerated well with no side effects. There is no concern for medication non-adherence.      Special Features  Status epilepticus: No  Self Injury Seizures: No  Precipitating Factors: None     Epilepsy Risk Factors:  Abnormal pregnancy: No  Abnormal birth/: Full term but small for gestational age (4.5lbs)  Abnormal Development: Walking at 1.5 years. Alyce Solomon did not develop until 2.5 years, attention deficit, she needed speech therapy  Febrile seizures, simple: No  Febrile seizures, complex: No  CNS infection: No  Mental retardation: Learning disability  Cerebral palsy: No  Head injury (moderate/severe): Yes  CNS neoplasm: No  CNS malformation: No  Neurosurgical procedure: No  Stroke: No  Alcohol abuse: No  Drug abuse: No  Family history Sz/epilepsy: No     Prior AEDs:  Luminal  Tegretol Syrup  Carbamazepine/Carbatrol (transitioned off to minimize the risk of osteoporosis)  Levetiracetam (weight gain, irritability)  Lamotrigine (depression, irritability)     Prior workup:  Imaging:  MRI brain 3/24/2008  [id number] T2 hyperintense focus within the subcortical white matter of the lateral aspect of the left midfrontal lobe (may be seen in migraine headaches)     2017 - MRI brain  No intracranial pathology  Mild left anterior ethmoid and maxillary sinus disease     EEGs:  3/21/2008 Dr. Pantera Kinney  6-7 Hz PDR  Bifrontal sharp and spike activity  Generalized spike-and-wave activity with frontal predominance     -2015 24 hours ambulatory EEG  Normal 24 hours ambulatory EEG  Patient event button of dizziness (twice) shows no change to the awake background EEG.    07/08/2016 >1 hour EEG  Rare presence of focal spike-slow waves over the left central-temporal region     8/30/2022 Routine EEG  Normal EEG    History Reviewed: The following were reviewed and updated as appropriate: allergies, current medications, past family history, past medical history, past social history, past surgical history and problem list    ROS:  Review of Systems   Constitutional: Negative for appetite change, fatigue and fever. HENT: Negative. Negative for hearing loss, tinnitus, trouble swallowing and voice change. Eyes: Negative. Negative for photophobia, pain and visual disturbance. Respiratory: Negative. Negative for shortness of breath. Cardiovascular: Negative. Negative for palpitations. Gastrointestinal: Negative. Negative for nausea and vomiting. Endocrine: Negative. Negative for cold intolerance. Genitourinary: Negative. Negative for dysuria, frequency and urgency. Musculoskeletal: Negative for back pain, gait problem, myalgias and neck pain. Skin: Negative. Negative for rash. Allergic/Immunologic: Negative. Neurological: Negative. Negative for dizziness, tremors, seizures, syncope, facial asymmetry, speech difficulty, weakness, light-headedness, numbness and headaches. Hematological: Negative. Does not bruise/bleed easily. Psychiatric/Behavioral: Negative. Negative for confusion, hallucinations and sleep disturbance. Objective    /63 (BP Location: Left arm, Patient Position: Sitting, Cuff Size: Standard)   Pulse 77   Temp (!) 97.2 °F (36.2 °C) (Temporal)   Ht 4' 9" (1.448 m)   Wt 63.7 kg (140 lb 6.4 oz)   BMI 30.38 kg/m²      General Exam  General: well developed, no acute distress. HEENT: mucous membranes moist, anicteric sclera. Neck: supple, good ROM. Extremities: no clubbing, cyanosis or edema. Skin: no rash on visible skin.     Neurological Exam  Mental Status: awake, alert, and fully oriented to person, place, time, and situation. Attention and memory intact. Fund of knowledge is appropriate for age and education. There is no neglect. Language: fluency, and comprehension normal.       Cranial Nerves: Pupils equal and reactive to light. Visual fields full to confrontation. Extraocular motions intact with full versions, normal pursuits and saccades. Facial strength full and symmetric. Hearing intact to voice. Tongue protrudes to midline. Palate elevates symmetrically. Speech clear without notable dysarthria. Motor: Normal bulk and tone. No pronator drift. Strength is 5/5 proximally and distally in all 4 extremities. No involuntary movements. Sensory: Sensation intact to light touch distally in all extremities. Coordination: Normal finger-to-nose. Normal rapid alternating movements. Station and gait: Casual gait normal.     Reflexes: Reflexes 2+ throughout and symmetric.       Sandro Warner MD   [facility]  [address] [facility]

## 2023-10-19 ENCOUNTER — OFFICE VISIT (OUTPATIENT)
Dept: INTERNAL MEDICINE CLINIC | Facility: CLINIC | Age: 38
End: 2023-10-19

## 2023-10-19 VITALS
HEART RATE: 68 BPM | DIASTOLIC BLOOD PRESSURE: 86 MMHG | OXYGEN SATURATION: 98 % | SYSTOLIC BLOOD PRESSURE: 128 MMHG | TEMPERATURE: 98 F | WEIGHT: 142 LBS | BODY MASS INDEX: 30.73 KG/M2

## 2023-10-19 DIAGNOSIS — J30.89 CHRONIC NONSEASONAL ALLERGIC RHINITIS DUE TO POLLEN: ICD-10-CM

## 2023-10-19 RX ORDER — IBUPROFEN 600 MG/1
TABLET ORAL
COMMUNITY
Start: 2023-08-27 | End: 2023-10-19 | Stop reason: ALTCHOICE

## 2023-10-19 RX ORDER — CLINDAMYCIN HYDROCHLORIDE 300 MG/1
CAPSULE ORAL
COMMUNITY
Start: 2023-10-08 | End: 2023-10-19 | Stop reason: ALTCHOICE

## 2023-10-19 RX ORDER — CIPROFLOXACIN 500 MG/1
TABLET, FILM COATED ORAL
COMMUNITY
Start: 2023-10-08 | End: 2023-10-19 | Stop reason: ALTCHOICE

## 2023-10-19 NOTE — PROGRESS NOTES
ASSESSMENT/PLAN:    Case and plan discussed with Dr. Togn Finch    There are no diagnoses linked to this encounter. Epilepsy  Follows with Neurology  Controlled on Carbatrol 100mg daily. Central hypothyroidism  Follows with Endocrinology  Most recent T4 0.76. Continue levothyroxine 75mcg daily. Hyperlipidemia  Encourage lifestyle modifications including low fat diet and exercise. Most recent labs and DEXA scan reviewed with patient today. Follow up in 6 months for an annual physical.        Immunization History   Administered Date(s) Administered    COVID-19 PFIZER VACCINE 0.3 ML IM 05/11/2021, 06/01/2021    INFLUENZA 09/25/2014, 11/10/2015    Influenza Quadrivalent, 6-35 Months IM 11/15/2016    Influenza, seasonal, injectable 09/25/2014    Tdap 11/15/2016         HISTORY OF PRESENT ILLNESS:  69-year-old with medical history of cognitive developmental delay, central hypothyroidism, epilepsy, allergic rhinitis presenting for a follow up of chronic conditions. Patient accompanied by mom today. Patient reports doing well with no complaints. She was recently seen in the ED for a dog bite in the right foot. Patient status post antibiotics and doing well. Patient follows up with neurology for epilepsy she is currently controlled on Carbatrol. She also follows up with endocrinology for central hypothyroidism currently controlled on levothyroxine 75 mcg daily. Patient overall has no complaints today. Review of Systems   Constitutional:  Negative for chills and fever. HENT:  Negative for ear pain and sore throat. Eyes:  Negative for pain and visual disturbance. Respiratory:  Negative for cough and shortness of breath. Cardiovascular:  Negative for chest pain and palpitations. Gastrointestinal:  Negative for abdominal pain and vomiting. Genitourinary:  Negative for dysuria and hematuria. Musculoskeletal:  Negative for arthralgias and back pain. Skin:  Negative for color change and rash. Neurological:  Negative for seizures and syncope. All other systems reviewed and are negative. OBJECTIVE:  There were no vitals filed for this visit. Physical Exam  Vitals and nursing note reviewed. Constitutional:       General: She is not in acute distress. Appearance: She is well-developed. HENT:      Head: Normocephalic and atraumatic. Eyes:      Conjunctiva/sclera: Conjunctivae normal.   Cardiovascular:      Rate and Rhythm: Normal rate and regular rhythm. Heart sounds: No murmur heard. Pulmonary:      Effort: Pulmonary effort is normal. No respiratory distress. Breath sounds: Normal breath sounds. Abdominal:      Palpations: Abdomen is soft. Tenderness: There is no abdominal tenderness. Musculoskeletal:         General: No swelling. Cervical back: Neck supple. Skin:     General: Skin is warm and dry. Capillary Refill: Capillary refill takes less than 2 seconds. Neurological:      Mental Status: She is alert. Mental status is at baseline.    Psychiatric:         Mood and Affect: Mood normal.              Current Outpatient Medications:     carbamazepine (CARBATROL) 100 MG 12 hr capsule, Take 1 capsule (100 mg total) by mouth in the morning, Disp: 90 capsule, Rfl: 3    ergocalciferol (VITAMIN D2) 50,000 units, Take 1 capsule (50,000 Units total) by mouth every 14 (fourteen) days, Disp: 26 capsule, Rfl: 0    fluticasone (FLONASE) 50 mcg/act nasal spray, 2 sprays into each nostril daily, Disp: 16 g, Rfl: 1    folic acid (FOLVITE) 1 mg tablet, take 1 tablet by mouth once daily, Disp: 30 tablet, Rfl: 11    levothyroxine 75 mcg tablet, Take 1 tablet (75 mcg total) by mouth daily, Disp: 90 tablet, Rfl: 1    loratadine (CLARITIN) 10 mg tablet, take 1 tablet by mouth once daily, Disp: 90 tablet, Rfl: 3    multivitamin (THERAGRAN) TABS, Take 1 tablet by mouth daily, Disp: , Rfl:     omeprazole (PriLOSEC) 20 mg delayed release capsule, take 1 capsule by mouth once daily (Patient taking differently: if needed), Disp: 90 capsule, Rfl: 3    phenazopyridine (PYRIDIUM) 100 mg tablet, Take 1 tablet (100 mg total) by mouth 3 (three) times a day as needed for bladder spasms, Disp: 10 tablet, Rfl: 0    Past Medical History:   Diagnosis Date    Learning disability     Mental capacity of an 7year old     Seizure ([address])     Thyroid condition      Past Surgical History:   Procedure Laterality Date    NO PAST SURGERIES       Family History   Problem Relation Age of Onset    Migraines Mother     No Known Problems Father      Social History     Socioeconomic History    Marital status: Single     Spouse name: Not on file    Number of children: 0    Years of education: Not on file    Highest education level: Not on file   Occupational History    Not on file   Tobacco Use    Smoking status: Never    Smokeless tobacco: Never   Vaping Use    Vaping Use: Never used   Substance and Sexual Activity    Alcohol use: Never    Drug use: Never    Sexual activity: Never   Other Topics Concern    Not on file   Social History Narrative    Caffeine use    Mental disability but able to perform unskilled work    Caregiver: nurses aide\    Sedentary lifestyle     Social Determinants of Health     Financial Resource Strain: Low Risk  (4/27/2023)    Overall Financial Resource Strain (CARDIA)     Difficulty of Paying Living Expenses: Not hard at all   Food Insecurity: No Food Insecurity (4/27/2023)    Hunger Vital Sign     Worried About Running Out of Food in the Last Year: Never true     Ran Out of Food in the Last Year: Never true   Transportation Needs: No Transportation Needs (4/27/2023)    PRAPARE - Transportation     Lack of Transportation (Medical): No     Lack of Transportation (Non-Medical):  No   Physical Activity: Inactive (7/24/2020)    Exercise Vital Sign     Days of Exercise per Week: 0 days     Minutes of Exercise per Session: 0 min   Stress: No Stress Concern Present (7/24/2020)    [employer] of Occupational Health - Occupational Stress Questionnaire     Feeling of Stress : Not at all   Social Connections: Socially Isolated (7/24/2020)    Social Connection and Isolation Panel [NHANES]     Frequency of Communication with Friends and Family: Three times a week     Frequency of Social Gatherings with Friends and Family: Twice a week     Attends [employer] Services: Never     Active Member of Clubs or Organizations: No     Attends Club or Organization Meetings: Never     Marital Status: Never    Intimate Partner Violence: Not At Risk (7/24/2020)    Humiliation, Afraid, Rape, and Kick questionnaire     Fear of Current or Ex-Partner: No     Emotionally Abused: No     Physically Abused: No     Sexually Abused: No   Housing Stability: Low Risk  (1/18/2023)    Housing Stability Vital Sign     Unable to Pay for Housing in the Last Year: No     Number of [address] in the Last Year: 1     Unstable Housing in the Last Year: No     Social History     Tobacco Use   Smoking Status Never   Smokeless Tobacco Never     Social History     Substance and Sexual Activity   Alcohol Use Never       Social History     Substance and Sexual Activity   Drug Use Never         Facundo Newton. MD Luis Carlos  Internal Medicine Residency, PGY-3  [address]   [address] [facility]  [address]  [facility], [address] [facility]

## 2023-12-26 DIAGNOSIS — J30.89 CHRONIC NONSEASONAL ALLERGIC RHINITIS DUE TO POLLEN: ICD-10-CM

## 2023-12-26 RX ORDER — LORATADINE 10 MG/1
TABLET ORAL
Qty: 90 TABLET | Refills: 3 | Status: SHIPPED | OUTPATIENT
Start: 2023-12-26

## 2024-01-31 ENCOUNTER — TELEPHONE (OUTPATIENT)
Dept: INTERNAL MEDICINE CLINIC | Facility: CLINIC | Age: 39
End: 2024-01-31

## 2024-01-31 DIAGNOSIS — E03.8 CENTRAL HYPOTHYROIDISM: Primary | ICD-10-CM

## 2024-02-07 ENCOUNTER — APPOINTMENT (OUTPATIENT)
Dept: LAB | Facility: CLINIC | Age: 39
End: 2024-02-07
Payer: COMMERCIAL

## 2024-02-07 ENCOUNTER — OFFICE VISIT (OUTPATIENT)
Dept: MULTI SPECIALTY CLINIC | Facility: CLINIC | Age: 39
End: 2024-02-07

## 2024-02-07 VITALS
TEMPERATURE: 98.1 F | SYSTOLIC BLOOD PRESSURE: 114 MMHG | HEIGHT: 57 IN | WEIGHT: 143 LBS | BODY MASS INDEX: 30.85 KG/M2 | HEART RATE: 79 BPM | DIASTOLIC BLOOD PRESSURE: 66 MMHG

## 2024-02-07 DIAGNOSIS — E03.8 CENTRAL HYPOTHYROIDISM: ICD-10-CM

## 2024-02-07 DIAGNOSIS — E03.8 CENTRAL HYPOTHYROIDISM: Primary | ICD-10-CM

## 2024-02-07 DIAGNOSIS — E55.9 VITAMIN D INSUFFICIENCY: ICD-10-CM

## 2024-02-07 LAB
25(OH)D3 SERPL-MCNC: 23.4 NG/ML (ref 30–100)
T4 FREE SERPL-MCNC: 0.81 NG/DL (ref 0.61–1.12)
TSH SERPL DL<=0.05 MIU/L-ACNC: 0.17 UIU/ML (ref 0.45–4.5)

## 2024-02-07 PROCEDURE — 84439 ASSAY OF FREE THYROXINE: CPT | Performed by: INTERNAL MEDICINE

## 2024-02-07 PROCEDURE — 82306 VITAMIN D 25 HYDROXY: CPT | Performed by: INTERNAL MEDICINE

## 2024-02-07 PROCEDURE — 99214 OFFICE O/P EST MOD 30 MIN: CPT

## 2024-02-07 PROCEDURE — 36415 COLL VENOUS BLD VENIPUNCTURE: CPT

## 2024-02-07 PROCEDURE — 84443 ASSAY THYROID STIM HORMONE: CPT

## 2024-02-07 RX ORDER — LEVOTHYROXINE SODIUM 0.07 MG/1
75 TABLET ORAL DAILY
Qty: 90 TABLET | Refills: 1 | Status: SHIPPED | OUTPATIENT
Start: 2024-02-07

## 2024-02-07 NOTE — PROGRESS NOTES
Established Patient Progress Note      Chief Complaint   Patient presents with    Follow-up        Impression & Plan:    Problem List Items Addressed This Visit          Endocrine    Central hypothyroidism - Primary     She is due for repeat thyroid labs. Will check fT4 due to central hypothyroidism. Will decide on dosing of levothyroxine once she repeats labs. Continue current dose of levothyroxine at this time.     She is complaining of some throat pain and does have thyromegaly on exam. I did not appreciate any thyroid nodule. I have ordered thyroid US to evaluate further.          Relevant Medications    levothyroxine 75 mcg tablet    Other Relevant Orders    T4, free    US thyroid       Other    Vitamin D insufficiency     Continue supplementation. Check vitamin d level.          Relevant Orders    Vitamin D 25 hydroxy       History of Present Illness:   Dustin Haas is a 38 y.o. female with central hypothyroidism seen in follow up.  #[id number] used for assistance in translation. Her mother is present to assist in history taking.     She has a PMH of developmental delay, epilepsy, migraines.   She has had hypothyroidism since 2020.   Her TSH and free T4 have been low in the past which is suggestive of central hypothyroidism. She has had an MRI in 2017 which did not show significant abnormalities. She is currently on levothyroxine 75 mcg 1 tablet daily. Patient is taking it 1 hr before breakfast on an empty stomach and at least 4 hrs apart from supplements. Tolerating medication well. She does report some throat pain.     She reports menstrual cycles are normal. Her weight is up about 5-10 pounds over the past 6 months. Denies sleeping disturbances or hot/cold intolerance. She has both diarrhea and constipation.     She also has vitamin-d deficiency, which she takes vitamin d supplementation - 50,000 units every 14 days.      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    Vitamin D insufficiency    Overweight (BMI 25.0-29.9)    Weight gain    Hypothyroidism due to Hashimoto's thyroiditis    Acne vulgaris    Vertiginous migraine    Decay, teeth    Gingivitis    Obesity (BMI 30-39.9)    Central hypothyroidism      Past Medical History:   Diagnosis Date    Learning disability     Mental capacity of an 9 year old     Seizure (HCC)     Thyroid condition       Past Surgical History:   Procedure Laterality Date    NO PAST SURGERIES        Family History   Problem Relation Age of Onset    Migraines Mother     No Known Problems Father      Social History     Tobacco Use    Smoking status: Never    Smokeless tobacco: Never   Substance Use Topics    Alcohol use: Never     Allergies   Allergen Reactions    Contrast [Iodinated Contrast Media]     Dust Mite Extract     Nickel     Penicillins     Pollen Extract     Short Ragweed Pollen Ext          Current Outpatient Medications:     levothyroxine 75 mcg tablet, Take 1 tablet (75 mcg total) by mouth daily, Disp: 90 tablet, Rfl: 1    carbamazepine (CARBATROL) 100 MG 12 hr capsule, Take 1 capsule (100 mg total) by mouth in the morning, Disp: 90 capsule, Rfl: 3    ergocalciferol (VITAMIN D2) 50,000 units, Take 1 capsule (50,000 Units total) by mouth every 14 (fourteen) days, Disp: 26 capsule, Rfl: 0    folic acid (FOLVITE) 1 mg tablet, take 1 tablet by mouth once daily, Disp: 30 tablet, Rfl: 11    loratadine (CLARITIN) 10 mg tablet, take 1 tablet by mouth once daily, Disp: 90 tablet, Rfl: 3    multivitamin (THERAGRAN) TABS, Take 1 tablet by mouth daily, Disp: , Rfl:     Review of Systems   Constitutional:  Positive for unexpected weight change (weight gain about 5pounds). Negative for activity change, appetite change, chills, diaphoresis, fatigue and fever.   HENT:  " Positive for sore throat. Negative for trouble swallowing and voice change.    Respiratory:  Negative for shortness of breath.    Cardiovascular:  Negative for palpitations and leg swelling.   Gastrointestinal:  Positive for constipation and diarrhea.   Endocrine: Negative for cold intolerance and heat intolerance.   Musculoskeletal:  Negative for arthralgias.   Neurological:  Negative for dizziness, tremors and weakness.   All other systems reviewed and are negative.      Physical Exam:  Body mass index is 30.94 kg/m².  /66 (BP Location: Left arm, Patient Position: Sitting, Cuff Size: Adult)   Pulse 79   Temp 98.1 °F (36.7 °C) (Temporal)   Ht 4' 9\" (1.448 m)   Wt 64.9 kg (143 lb)   BMI 30.94 kg/m²    Wt Readings from Last 3 Encounters:   02/07/24 64.9 kg (143 lb)   10/19/23 64.4 kg (142 lb)   09/29/23 63.7 kg (140 lb 6.4 oz)       Physical Exam  Vitals reviewed.   Constitutional:       Appearance: Normal appearance. She is obese.   Neck:      Thyroid: Thyromegaly present.   Neurological:      Mental Status: She is alert.   Psychiatric:         Mood and Affect: Mood normal.       Labs:     Component      Latest Ref Rng 8/7/2023   Free T4      0.61 - 1.12 ng/dL 0.76    TSH 3RD GENERATON      0.450 - 4.500 uIU/mL 0.204 (L)    Vit D, 25-Hydroxy      30.0 - 100.0 ng/mL 31.0       Legend:  (L) Low        There are no Patient Instructions on file for this visit.    Discussed with the patient and all questioned fully answered. She will call me if any problems arise.    JENNIFER Mcgregor  "

## 2024-02-07 NOTE — ASSESSMENT & PLAN NOTE
She is due for repeat thyroid labs. Will check fT4 due to central hypothyroidism. Will decide on dosing of levothyroxine once she repeats labs. Continue current dose of levothyroxine at this time.     She is complaining of some throat pain and does have thyromegaly on exam. I did not appreciate any thyroid nodule. I have ordered thyroid US to evaluate further.

## 2024-02-09 DIAGNOSIS — E55.9 VITAMIN D INSUFFICIENCY: ICD-10-CM

## 2024-02-09 RX ORDER — ERGOCALCIFEROL 1.25 MG/1
50000 CAPSULE ORAL WEEKLY
Qty: 26 CAPSULE | Refills: 0 | Status: SHIPPED | OUTPATIENT
Start: 2024-02-09

## 2024-03-29 ENCOUNTER — HOSPITAL ENCOUNTER (OUTPATIENT)
Dept: RADIOLOGY | Facility: HOSPITAL | Age: 39
Discharge: HOME | End: 2024-03-29
Payer: COMMERCIAL

## 2024-03-29 DIAGNOSIS — E03.8 CENTRAL HYPOTHYROIDISM: ICD-10-CM

## 2024-03-29 PROCEDURE — 76536 US EXAM OF HEAD AND NECK: CPT

## 2024-05-10 DIAGNOSIS — G40.909 NONINTRACTABLE EPILEPSY WITHOUT STATUS EPILEPTICUS, UNSPECIFIED EPILEPSY TYPE (HCC): ICD-10-CM

## 2024-05-13 RX ORDER — FOLIC ACID 1 MG/1
TABLET ORAL
Qty: 30 TABLET | Refills: 11 | Status: SHIPPED | OUTPATIENT
Start: 2024-05-13

## 2024-06-18 ENCOUNTER — OFFICE VISIT (OUTPATIENT)
Dept: INTERNAL MEDICINE CLINIC | Facility: CLINIC | Age: 39
End: 2024-06-18

## 2024-06-18 VITALS
BODY MASS INDEX: 29.65 KG/M2 | TEMPERATURE: 98.3 F | WEIGHT: 137 LBS | HEART RATE: 69 BPM | SYSTOLIC BLOOD PRESSURE: 102 MMHG | DIASTOLIC BLOOD PRESSURE: 65 MMHG

## 2024-06-18 DIAGNOSIS — G40.802 OTHER EPILEPSY WITHOUT STATUS EPILEPTICUS, NOT INTRACTABLE (HCC): ICD-10-CM

## 2024-06-18 DIAGNOSIS — E03.8 CENTRAL HYPOTHYROIDISM: ICD-10-CM

## 2024-06-18 DIAGNOSIS — L29.9 ITCHING: Primary | ICD-10-CM

## 2024-06-18 PROCEDURE — 99395 PREV VISIT EST AGE 18-39: CPT | Performed by: INTERNAL MEDICINE

## 2024-06-18 RX ORDER — CALAMINE
LOTION (ML) TOPICAL AS NEEDED
Qty: 180 ML | Refills: 0 | Status: SHIPPED | OUTPATIENT
Start: 2024-06-18 | End: 2024-07-02

## 2024-06-18 NOTE — PROGRESS NOTES
Adult Annual Physical  Name: Dustin Haas      : 1985      MRN: [id number]  Encounter Provider: Ye Spivey MD  Encounter Date: 2024   Encounter department: [facility]    Assessment & Plan   1. Itching  Patient and mother mentions working with the plants 2 days ago and since getting itchiness on upper and lower extremities  Patient herself does not have any rash except erythema secondary to itching  But mother does have one lesion on arm suspicious for poison ivy dermatitis    No itching on face or genital area  NO associated fever, chills, nausea, vomiting, abdominal pain, shortness of breath, cough    Plan  Unlikely to develop rash now since 2 days since exposure  Will provide zinc calamine lotion for symptomatic relief  Advised to call us if develops new significant rash other than erythema from itching    -     calamine lotion; Apply topically as needed for itching for up to 14 days  2. Central hypothyroidism  TSH chronically low; most recent T4 normal  Stable on levothyroxine 75 mcg OD   Follows with endocrinology    3. Other epilepsy without status epilepticus, not intractable (HCC)  History of epilepsy since fall in childhood  Most recent EEG without any seizures  No seizure since 20 years  Follows with neurology and is stable on subtherapeutic dose of carbamazepine and hence was advised to discontinue but mother prefers to continue    Immunizations and preventive care screenings were discussed with patient today. Appropriate education was printed on patient's after visit summary.    Counseling:  Alcohol/drug use: discussed moderation in alcohol intake, the recommendations for healthy alcohol use, and avoidance of illicit drug use.  Dental Health: discussed importance of regular tooth brushing, flossing, and dental visits.  Injury prevention: discussed safety/seat belts, safety helmets, smoke detectors, carbon dioxide detectors, and smoking near bedding  or upholstery.  Sexual health: discussed sexually transmitted diseases, partner selection, use of condoms, avoidance of unintended pregnancy, and contraceptive alternatives.  Exercise: the importance of regular exercise/physical activity was discussed. Recommend exercise 3-5 times per week for at least 30 minutes.     BMI Counseling: Body mass index is 29.65 kg/m². The BMI is above normal. Nutrition recommendations include decreasing portion sizes, decreasing fast food intake, consuming healthier snacks, moderation in carbohydrate intake and reducing intake of cholesterol. Exercise recommendations include moderate physical activity 150 minutes/week, exercising 3-5 times per week and strength training exercises. No pharmacotherapy was ordered. Patient referred to PCP. Rationale for BMI follow-up plan is due to patient being overweight or obese.     Depression Screening and Follow-up Plan: Patient was screened for depression during today's encounter. They screened negative with a PHQ-2 score of 0.      History of Present Illness     Adult Annual Physical:  Patient presents for annual physical.     Diet and Physical Activity:  - Diet/Nutrition: portion control, frequent junk food, consuming 3-5 servings of fruits/vegetables daily and adequate fiber intake.  - Exercise: no formal exercise.    Depression Screening:  - PHQ-2 Score: 0    General Health:  - Sleep: sleeps well and 7-8 hours of sleep on average.  - Hearing: normal hearing bilateral ears.  - Vision: no vision problems.  - Dental: no dental visits for > 1 year, brushes teeth once daily and does not floss.    /GYN Health:  - Follows with GYN: yes.   - Menopause: premenopausal.   - History of STDs: no  - Contraception:. never sexually active      Review of Systems   Constitutional:  Negative for activity change, appetite change, chills, diaphoresis, fatigue, fever and unexpected weight change.   HENT:  Negative for congestion, dental problem, sinus pressure,  sinus pain, sneezing and tinnitus.    Eyes:  Negative for pain, redness, itching and visual disturbance.   Respiratory:  Negative for cough, shortness of breath and wheezing.    Cardiovascular:  Negative for chest pain, palpitations and leg swelling.   Gastrointestinal:  Negative for abdominal distention and abdominal pain.   Skin:  Negative for color change, rash and wound.        Itching on upper and lower extremities since 2 days   Allergic/Immunologic: Positive for environmental allergies.   Neurological:  Negative for dizziness, light-headedness and headaches.   Hematological:  Negative for adenopathy. Does not bruise/bleed easily.   Psychiatric/Behavioral:  Negative for agitation and behavioral problems.      Pertinent Medical History   Epilepsy  Migraine  Central hypothyroidism  Intellectual disability    Current Outpatient Medications on File Prior to Visit   Medication Sig Dispense Refill    carbamazepine (CARBATROL) 100 MG 12 hr capsule Take 1 capsule (100 mg total) by mouth in the morning 90 capsule 3    ergocalciferol (VITAMIN D2) 50,000 units Take 1 capsule (50,000 Units total) by mouth once a week 26 capsule 0    folic acid (FOLVITE) 1 mg tablet take 1 tablet by mouth once daily 30 tablet 11    levothyroxine 75 mcg tablet Take 1 tablet (75 mcg total) by mouth daily 90 tablet 1    loratadine (CLARITIN) 10 mg tablet take 1 tablet by mouth once daily 90 tablet 3    multivitamin (THERAGRAN) TABS Take 1 tablet by mouth daily       No current facility-administered medications on file prior to visit.      Social History     Tobacco Use    Smoking status: Never    Smokeless tobacco: Never   Vaping Use    Vaping status: Never Used   Substance and Sexual Activity    Alcohol use: Never    Drug use: Never    Sexual activity: Never       Objective     /65 (BP Location: Left arm, Patient Position: Sitting, Cuff Size: Standard)   Pulse 69   Temp 98.3 °F (36.8 °C) (Temporal)   Wt 62.1 kg (137 lb)   BMI 29.65  kg/m²     Physical Exam  Vitals and nursing note reviewed.   Constitutional:       General: She is not in acute distress.     Appearance: Normal appearance. She is not ill-appearing or toxic-appearing.   HENT:      Head: Normocephalic and atraumatic.      Nose: Nose normal.      Mouth/Throat:      Mouth: Mucous membranes are moist.      Pharynx: Oropharynx is clear.   Eyes:      Conjunctiva/sclera: Conjunctivae normal.      Pupils: Pupils are equal, round, and reactive to light.   Cardiovascular:      Rate and Rhythm: Normal rate and regular rhythm.   Pulmonary:      Effort: Pulmonary effort is normal.      Breath sounds: Normal breath sounds.   Abdominal:      General: Bowel sounds are normal.      Palpations: Abdomen is soft.   Musculoskeletal:      Right lower leg: No edema.      Left lower leg: No edema.   Skin:     General: Skin is warm.      Coloration: Skin is not jaundiced or pale.      Findings: Erythema (mild erythema at scratch sites on upper and lower extremities) present. No rash.   Neurological:      General: No focal deficit present.      Mental Status: She is alert and oriented to person, place, and time.   Psychiatric:         Mood and Affect: Mood normal.         Behavior: Behavior normal.       Administrative Statements   I have spent a total time of 40 minutes on 06/18/24 In caring for this patient including Prognosis, Risks and benefits of tx options, Instructions for management, Patient and family education, Importance of tx compliance, Risk factor reductions, Impressions, Counseling / Coordination of care, and Documenting in the medical record.    Ye Spivey MD  PGY-2, Internal Medicine  [facility]

## 2024-08-07 ENCOUNTER — OFFICE VISIT (OUTPATIENT)
Dept: MULTI SPECIALTY CLINIC | Facility: CLINIC | Age: 39
End: 2024-08-07

## 2024-08-07 ENCOUNTER — APPOINTMENT (OUTPATIENT)
Dept: LAB | Facility: CLINIC | Age: 39
End: 2024-08-07
Payer: COMMERCIAL

## 2024-08-07 VITALS
TEMPERATURE: 98.4 F | SYSTOLIC BLOOD PRESSURE: 118 MMHG | BODY MASS INDEX: 29.99 KG/M2 | HEART RATE: 74 BPM | WEIGHT: 139 LBS | DIASTOLIC BLOOD PRESSURE: 75 MMHG | HEIGHT: 57 IN

## 2024-08-07 DIAGNOSIS — E55.9 VITAMIN D INSUFFICIENCY: ICD-10-CM

## 2024-08-07 DIAGNOSIS — E66.3 OVERWEIGHT (BMI 25.0-29.9): Chronic | ICD-10-CM

## 2024-08-07 DIAGNOSIS — E03.8 CENTRAL HYPOTHYROIDISM: ICD-10-CM

## 2024-08-07 DIAGNOSIS — E03.8 CENTRAL HYPOTHYROIDISM: Primary | ICD-10-CM

## 2024-08-07 LAB
25(OH)D3 SERPL-MCNC: 57.1 NG/ML (ref 30–100)
ALBUMIN SERPL BCG-MCNC: 4.2 G/DL (ref 3.5–5)
ALP SERPL-CCNC: 80 U/L (ref 34–104)
ALT SERPL W P-5'-P-CCNC: 11 U/L (ref 7–52)
ANION GAP SERPL CALCULATED.3IONS-SCNC: 5 MMOL/L (ref 4–13)
AST SERPL W P-5'-P-CCNC: 15 U/L (ref 13–39)
BILIRUB SERPL-MCNC: 0.21 MG/DL (ref 0.2–1)
BUN SERPL-MCNC: 8 MG/DL (ref 5–25)
CALCIUM SERPL-MCNC: 9.3 MG/DL (ref 8.4–10.2)
CHLORIDE SERPL-SCNC: 107 MMOL/L (ref 96–108)
CO2 SERPL-SCNC: 26 MMOL/L (ref 21–32)
CREAT SERPL-MCNC: 0.47 MG/DL (ref 0.6–1.3)
GFR SERPL CREATININE-BSD FRML MDRD: 126 ML/MIN/1.73SQ M
GLUCOSE SERPL-MCNC: 77 MG/DL (ref 65–140)
POTASSIUM SERPL-SCNC: 3.9 MMOL/L (ref 3.5–5.3)
PROT SERPL-MCNC: 7.2 G/DL (ref 6.4–8.4)
SODIUM SERPL-SCNC: 138 MMOL/L (ref 135–147)
T4 FREE SERPL-MCNC: 0.83 NG/DL (ref 0.61–1.12)

## 2024-08-07 PROCEDURE — 84439 ASSAY OF FREE THYROXINE: CPT

## 2024-08-07 PROCEDURE — 80053 COMPREHEN METABOLIC PANEL: CPT

## 2024-08-07 PROCEDURE — 82306 VITAMIN D 25 HYDROXY: CPT

## 2024-08-07 PROCEDURE — 36415 COLL VENOUS BLD VENIPUNCTURE: CPT

## 2024-08-07 RX ORDER — ERGOCALCIFEROL 1.25 MG/1
50000 CAPSULE ORAL WEEKLY
Qty: 4 CAPSULE | Refills: 0 | Status: SHIPPED | OUTPATIENT
Start: 2024-08-07

## 2024-08-07 NOTE — ASSESSMENT & PLAN NOTE
Clinically asymptomatic.   Recommend repeat free T4.   Recommend continue levothyroxine 75 mcg until lab work completed.

## 2024-08-07 NOTE — PROGRESS NOTES
Established Patient Progress Note     CC: Endocrinology follow up visit    Impression & Plan:    Problem List Items Addressed This Visit          Endocrine    Central hypothyroidism - Primary     Clinically asymptomatic.   Recommend repeat free T4.   Recommend continue levothyroxine 75 mcg until lab work completed.          Relevant Orders    T4, free       Other    Overweight (BMI 25.0-29.9) (Chronic)    Vitamin D insufficiency     Will continue on 50,000 IU once weekly until lab work completed.          Relevant Medications    ergocalciferol (VITAMIN D2) 50,000 units    Other Relevant Orders    Vitamin D 25 hydroxy       History of Present Illness:     Dustin Haas is a 38 y.o. female with a history of hypothyroidism diagnosed in 2020 with past medical history including developmental delay, epilepsy, and migraines. In the past her TSH and free T4 have been low which is suggestive of central hypothyroidism with unremarkable MRI in 2017. Continues on levothyroxine 75 mcg daily, regularly and properly without side effects.     ID: [id number]    Denies change in energy level or weight.  Denies changes in level of concentration or ability to fall or stay asleep. Denies jitteriness or tremors.  Denies tachycardia.  Denies constipation or hyperdefecation.  Denies frequent headaches.  Denies temperature intolerances.      Ultrasound thyroid normal from March 2024 no nodules identified.     Family history in mother's aunt of thyroid cancer. Had a history of head trauma at age 5 with seizures starting after.     For history of vitamin D deficiency, she is taking vitamin D 50,000 IU every week.     Patient Active Problem List   Diagnosis    Allergic rhinitis    Anticonvulsant drug-induced bone softening    Cognitive developmental delay    Epilepsy (HCC)    Headache, variant migraine    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    Vitamin D insufficiency    Overweight (BMI 25.0-29.9)    Weight  "gain    Hypothyroidism due to Hashimoto's thyroiditis    Acne vulgaris    Vertiginous migraine    Decay, teeth    Gingivitis    Obesity (BMI 30-39.9)    Central hypothyroidism      Past Medical History:   Diagnosis Date    Learning disability     Mental capacity of an 9 year old     Seizure (HCC)     Thyroid condition       Past Surgical History:   Procedure Laterality Date    NO PAST SURGERIES        Family History   Problem Relation Age of Onset    Migraines Mother     No Known Problems Father      Social History     Tobacco Use    Smoking status: Never    Smokeless tobacco: Never   Substance Use Topics    Alcohol use: Never     Allergies   Allergen Reactions    Contrast [Iodinated Contrast Media]     Dust Mite Extract     Nickel     Penicillins     Pollen Extract     Short Ragweed Pollen Ext        Current Outpatient Medications:     ergocalciferol (VITAMIN D2) 50,000 units, Take 1 capsule (50,000 Units total) by mouth once a week, Disp: 4 capsule, Rfl: 0    calamine lotion, Apply topically as needed for itching for up to 14 days, Disp: 180 mL, Rfl: 0    carbamazepine (CARBATROL) 100 MG 12 hr capsule, Take 1 capsule (100 mg total) by mouth in the morning, Disp: 90 capsule, Rfl: 3    folic acid (FOLVITE) 1 mg tablet, take 1 tablet by mouth once daily, Disp: 30 tablet, Rfl: 11    levothyroxine 75 mcg tablet, Take 1 tablet (75 mcg total) by mouth daily, Disp: 90 tablet, Rfl: 1    loratadine (CLARITIN) 10 mg tablet, take 1 tablet by mouth once daily, Disp: 90 tablet, Rfl: 3    multivitamin (THERAGRAN) TABS, Take 1 tablet by mouth daily, Disp: , Rfl:     Review of Systems  See HPI.   All other systems reviewed and are negative.    Physical Exam:  Body mass index is 30.08 kg/m².  /75 (BP Location: Right arm, Patient Position: Sitting, Cuff Size: Adult)   Pulse 74   Temp 98.4 °F (36.9 °C) (Temporal)   Ht 4' 9\" (1.448 m)   Wt 63 kg (139 lb)   BMI 30.08 kg/m²    Wt Readings from Last 3 Encounters:   08/07/24 63 " kg (139 lb)   06/18/24 62.1 kg (137 lb)   02/07/24 64.9 kg (143 lb)     Physical Exam  Vitals reviewed.   Constitutional:       Appearance: Normal appearance.   Cardiovascular:      Rate and Rhythm: Normal rate and regular rhythm.      Pulses: Normal pulses.      Heart sounds: Normal heart sounds.   Pulmonary:      Effort: Pulmonary effort is normal.      Breath sounds: Normal breath sounds.   Skin:     General: Skin is warm and dry.      Capillary Refill: Capillary refill takes less than 2 seconds.   Neurological:      General: No focal deficit present.      Mental Status: She is alert and oriented to person, place, and time.   Psychiatric:         Mood and Affect: Mood normal.         Behavior: Behavior normal.     Labs:   Component      Latest Ref Rng 8/7/2023 2/7/2024   FREE T4      0.61 - 1.12 ng/dL 0.76  0.81    TSH 3RD GENERATON      0.450 - 4.500 uIU/mL 0.204 (L)  0.174 (L)    VITAMIN D, 25-HYDROXY      30.0 - 100.0 ng/mL 31.0  23.4 (L)        Discussed with the patient and all questioned fully answered. She will call me if any problems arise.    Follow-up appointment in 6 months.     Counseled patient on diagnostic results, prognosis, risk and benefit of treatment options, instruction for management, importance of treatment compliance, Risk  factor reduction and impressions    [facility] ENDOCRINOLOGY

## 2024-09-09 ENCOUNTER — VBI (OUTPATIENT)
Dept: ADMINISTRATIVE | Facility: OTHER | Age: 39
End: 2024-09-09

## 2024-09-09 NOTE — TELEPHONE ENCOUNTER
09/09/24 12:41 PM     Chart reviewed for Pap Smear (HPV) aka Cervical Cancer Screening ; nothing is submitted to the patient's insurance at this time.     Gemma Houser MA   [facility]

## 2024-09-12 ENCOUNTER — TELEPHONE (OUTPATIENT)
Dept: NEUROLOGY | Facility: CLINIC | Age: 39
End: 2024-09-12

## 2024-09-20 DIAGNOSIS — G40.909 NONINTRACTABLE EPILEPSY WITHOUT STATUS EPILEPTICUS, UNSPECIFIED EPILEPSY TYPE (HCC): ICD-10-CM

## 2024-09-20 RX ORDER — CARBAMAZEPINE 100 MG/1
CAPSULE, EXTENDED RELEASE ORAL EVERY MORNING
Qty: 90 CAPSULE | Refills: 3 | Status: SHIPPED | OUTPATIENT
Start: 2024-09-20

## 2024-10-24 DIAGNOSIS — E03.8 CENTRAL HYPOTHYROIDISM: ICD-10-CM

## 2024-10-25 RX ORDER — LEVOTHYROXINE SODIUM 75 UG/1
75 TABLET ORAL DAILY
Qty: 90 TABLET | Refills: 1 | Status: SHIPPED | OUTPATIENT
Start: 2024-10-25

## 2024-11-26 ENCOUNTER — OFFICE VISIT (OUTPATIENT)
Dept: INTERNAL MEDICINE CLINIC | Facility: CLINIC | Age: 39
End: 2024-11-26

## 2024-11-26 VITALS
HEART RATE: 79 BPM | HEIGHT: 57 IN | DIASTOLIC BLOOD PRESSURE: 77 MMHG | TEMPERATURE: 98.1 F | SYSTOLIC BLOOD PRESSURE: 116 MMHG | BODY MASS INDEX: 30.63 KG/M2 | WEIGHT: 142 LBS

## 2024-11-26 DIAGNOSIS — L84 FOOT CALLUS: Primary | ICD-10-CM

## 2024-11-26 PROBLEM — K05.10 GINGIVITIS: Status: RESOLVED | Noted: 2022-08-29 | Resolved: 2024-11-26

## 2024-11-26 PROBLEM — K02.9 DECAY, TEETH: Status: RESOLVED | Noted: 2022-02-16 | Resolved: 2024-11-26

## 2024-11-26 PROBLEM — R63.5 WEIGHT GAIN: Status: RESOLVED | Noted: 2020-06-13 | Resolved: 2024-11-26

## 2024-11-26 PROBLEM — E66.3 OVERWEIGHT (BMI 25.0-29.9): Chronic | Status: RESOLVED | Noted: 2019-06-06 | Resolved: 2024-11-26

## 2024-11-26 PROBLEM — E66.9 OBESITY (BMI 30-39.9): Status: RESOLVED | Noted: 2022-10-11 | Resolved: 2024-11-26

## 2024-11-26 RX ORDER — IBUPROFEN 600 MG/1
TABLET, FILM COATED ORAL
COMMUNITY
Start: 2024-09-17

## 2024-11-26 RX ORDER — METHOCARBAMOL 750 MG/1
750 TABLET, FILM COATED ORAL 4 TIMES DAILY PRN
COMMUNITY
Start: 2024-09-17

## 2024-11-26 RX ORDER — LIDOCAINE 50 MG/G
PATCH TOPICAL
COMMUNITY

## 2024-12-02 ENCOUNTER — CONSULT (OUTPATIENT)
Dept: MULTI SPECIALTY CLINIC | Facility: CLINIC | Age: 39
End: 2024-12-02

## 2024-12-02 VITALS
BODY MASS INDEX: 30.94 KG/M2 | SYSTOLIC BLOOD PRESSURE: 122 MMHG | HEART RATE: 74 BPM | OXYGEN SATURATION: 98 % | WEIGHT: 143 LBS | DIASTOLIC BLOOD PRESSURE: 79 MMHG | TEMPERATURE: 98.6 F

## 2024-12-02 DIAGNOSIS — L84 FOOT CALLUS: ICD-10-CM

## 2024-12-02 DIAGNOSIS — L84 CALLUS: Primary | ICD-10-CM

## 2024-12-02 PROBLEM — E06.3 HYPOTHYROIDISM DUE TO HASHIMOTO'S THYROIDITIS: Status: RESOLVED | Noted: 2020-11-12 | Resolved: 2024-12-02

## 2024-12-02 RX ORDER — UREA 1 ML/10ML
LOTION TOPICAL AS NEEDED
Qty: 228 G | Refills: 1 | Status: SHIPPED | OUTPATIENT
Start: 2024-12-02

## 2024-12-02 NOTE — PATIENT INSTRUCTIONS
Apply urea cream as needed to callus  Apply U-shaped/horseshoe corn pad to callus as needed.  These can be purchased from [employer]  3 times a week after showering when skin is soft gently exfoliate callused skin with pumice stone

## 2024-12-02 NOTE — PROGRESS NOTES
Podiatry Clinic Visit  Dustin Haas 39 y.o. female MRN: [id number]  Encounter: [id number]    Assessment & Plan        Diagnoses and all orders for this visit:    Callus  -     urea 10 % lotion; Apply topically as needed for dry skin Apply daily to callus as needed    Foot callus  -     Ambulatory Referral to Podiatry           Plan:  Patient was examined, evaluated, and treated with all questions and concerns addressed.  Callus at lateral aspect of right foot parred to normal epithelium.  See procedure note below.  U-shaped felted foam pad applied to area for offloading.  Showed patient and mother where they may purchase these pads online, these instructions were placed in after visit summary  Recommend wider shoe gear.  Patient may gently exfoliate callused area with pumice stone 3 times weekly after showering  Prescription dispensed for urea cream to be applied daily to callus to help with skin buildup  Treatment plan discussed in detail with patient and mother.  They are amenable.  All questions and concerns addressed.   [place] #[id number] used for translation assistance  Patient was re-appointed for prn    - Dr. Snider  was available/present for entirety of patient encounter and present for all procedures.    Lesion Destruction    Date/Time: 12/2/2024 2:00 PM    Performed by: Jody Chappell DPM  Authorized by: Jody Chappell DPM  Universal Protocol:  Consent: Verbal consent obtained.  Risks and benefits: risks, benefits and alternatives were discussed  Consent given by: patient and parent  Patient understanding: patient states understanding of the procedure being performed  Patient identity confirmed: verbally with patient and provided demographic data       Following verbal consent by patient and mother, callus at right fifth metatarsal base lateral aspect parred to normal epithelium with #15 blade.  This procedure was well-tolerated by patient and performed without incident      History of  Present Illness     HPI: Dustin Haas is a 39 y.o. female who presents with complaint of right lateral foot callus.  Patient does not know how long it has been present for.  Patient states it is painful when she walks and when she wear shoes.  Patient is accompanied by her mother.. The patient has no further podiatric complaints at this time.    Review of Systems   Constitutional: Negative.    HENT: Negative.    Eyes: Negative.    Respiratory: Negative.    Cardiovascular: Negative.    Gastrointestinal: Negative.    Musculoskeletal: Negative  Skin: Right foot callus  Neurological: Negative.        Historical Information   Past Medical History:   Diagnosis Date    Learning disability     Mental capacity of an 9 year old     Seizure (HCC)     Thyroid condition      Past Surgical History:   Procedure Laterality Date    NO PAST SURGERIES       Social History   Social History     Substance and Sexual Activity   Alcohol Use Never     Social History     Substance and Sexual Activity   Drug Use Never     Social History     Tobacco Use   Smoking Status Never   Smokeless Tobacco Never     Family History:   Family History   Problem Relation Age of Onset    Migraines Mother     No Known Problems Father        Meds/Allergies   Not in a hospital admission.  Allergies   Allergen Reactions    Contrast [Iodinated Contrast Media]     Dust Mite Extract     Nickel     Penicillins     Pollen Extract     Short Ragweed Pollen Ext        Objective     Current Vitals:   Blood Pressure: 122/79 (12/02/24 1339)  Pulse: 74 (12/02/24 1339)  Temperature: 98.6 °F (37 °C) (12/02/24 1339)  Temp Source: Temporal (12/02/24 1339)  Weight - Scale: 64.9 kg (143 lb) (12/02/24 1339)  SpO2: 98 % (12/02/24 1339)        /79 (BP Location: Left arm, Patient Position: Sitting, Cuff Size: Standard)   Pulse 74   Temp 98.6 °F (37 °C) (Temporal)   Wt 64.9 kg (143 lb)   SpO2 98%   BMI 30.94 kg/m²       Lower Extremity Exam:    Foot  Exam    Musculoskeletal:  MMT is 5/5 to all compartments of the LE B/L, + 0/4 edema B/L, mild pain on  palpation of right foot callus, Equinus is not present. No pain with passive ROM of pedal joints.    Biomechanical Exam of LE:  Hip ROM WNL Bilateral, external and internal rotation about equal at 45° b/l  Knee ROM WNL Bilateral, no hyperextension noted b/l, no genu varum/valgum noted b/l  Ankle dorsiflexion with knee extended is able to get pass vertical on right and able to get pass vertical on left  Ankle dorsiflexion with knee flexed is able to get pass vertical on right and able to get pass vertical on left  Malleolar positioning is WNL b/l  STJ ROM WNL b/l, heel inversion is approximately 20° on right and 20° on left; heel eversion is approximately 10° on right and 10° on left; neutral calcaneal stance position is inverted 5° bilaterally  1st ray ROM WNL b/l, able to dorsiflex/plantarflex b/l  Tibial varum is 0° b/l, Resting calcaneal stance position is varus and approximately 5° on right and varus and approximately 5° on left  Gait analysis: WNL  Gross deformity noted:    and pes cavus     Vascular:   DP pulses are present bilaterally and PT pulses are present bilaterally., CFT< 3sec to all digits. Pedal hair is Present. Skin temperature warm to warm B/L.     Dermatological:  No open Lesions. Skin of the LE is normal texture, temperature, turgor B/L. Interdigital maceration is not present.  Right foot callus at lateral aspect of fifth metatarsal base       Neurologic:  Gross sensation is intact. Monofilament sensation is Intact. Sharp sensation is present.

## 2024-12-02 NOTE — PROGRESS NOTES
Name: Dustin Haas      : 1985      MRN: [id number]  Encounter Provider: Anitra Figueroa PA-C  Encounter Date: 2024   Encounter department: [facility] [place]  :  Assessment & Plan  Foot callus  Recommend wearing supportive foot wear. Soak feet throughout the day with warm water and Epsom salt. Tylenol or ibuprofen as needed. Recommend follow up with podiatry. She was agreeable.   Orders:    Ambulatory Referral to Podiatry; Future           History of Present Illness     Patient is a 39 year old female presenting for a same day. She is here with her mother, her main caregiver. Patient is complaining of right foot pain for about one week. Denies injury or trauma. States she has had a callus on her outer foot that is painful. Denies redness, swelling, or ecchymosis. Denies numbness, tingling, or weakness. Denies drainage or discharge. She has not tried anything for this yet.   Use of  services was utilized during visit.       Review of Systems   Constitutional:  Negative for chills, fatigue and fever.   Skin:  Positive for rash. Negative for wound.   Neurological:  Negative for dizziness, weakness, light-headedness and headaches.   Hematological:  Negative for adenopathy.     Past Medical History   Past Medical History:   Diagnosis Date    Learning disability     Mental capacity of an 9 year old     Seizure (HCC)     Thyroid condition      Past Surgical History:   Procedure Laterality Date    NO PAST SURGERIES       Family History   Problem Relation Age of Onset    Migraines Mother     No Known Problems Father       reports that she has never smoked. She has never used smokeless tobacco. She reports that she does not drink alcohol and does not use drugs.  Current Outpatient Medications on File Prior to Visit   Medication Sig Dispense Refill    ibuprofen (MOTRIN) 600 mg tablet take 1 tablet by mouth every 6 hours if needed for MILD PAIN ( PAIN SCALE 1-3 )       methocarbamol (ROBAXIN) 750 mg tablet Take 750 mg by mouth 4 (four) times a day as needed      calamine lotion Apply topically as needed for itching for up to 14 days 180 mL 0    carbamazepine (CARBATROL) 100 MG 12 hr capsule take 1 tablet by mouth every morning 90 capsule 3    ergocalciferol (VITAMIN D2) 50,000 units Take 1 capsule (50,000 Units total) by mouth once a week 4 capsule 0    folic acid (FOLVITE) 1 mg tablet take 1 tablet by mouth once daily 30 tablet 11    levothyroxine 75 mcg tablet take 1 tablet by mouth once daily 90 tablet 1    lidocaine (LIDODERM) 5 % place 1 patch ON THE SKIN EVERY 12 HOURS. REMOVE AND DISCARD PATC...  (REFER TO PRESCRIPTION NOTES).      loratadine (CLARITIN) 10 mg tablet take 1 tablet by mouth once daily 90 tablet 3    multivitamin (THERAGRAN) TABS Take 1 tablet by mouth daily       No current facility-administered medications on file prior to visit.     Allergies   Allergen Reactions    Contrast [Iodinated Contrast Media]     Dust Mite Extract     Nickel     Penicillins     Pollen Extract     Short Ragweed Pollen Ext       Current Outpatient Medications on File Prior to Visit   Medication Sig Dispense Refill    ibuprofen (MOTRIN) 600 mg tablet take 1 tablet by mouth every 6 hours if needed for MILD PAIN ( PAIN SCALE 1-3 )      methocarbamol (ROBAXIN) 750 mg tablet Take 750 mg by mouth 4 (four) times a day as needed      calamine lotion Apply topically as needed for itching for up to 14 days 180 mL 0    carbamazepine (CARBATROL) 100 MG 12 hr capsule take 1 tablet by mouth every morning 90 capsule 3    ergocalciferol (VITAMIN D2) 50,000 units Take 1 capsule (50,000 Units total) by mouth once a week 4 capsule 0    folic acid (FOLVITE) 1 mg tablet take 1 tablet by mouth once daily 30 tablet 11    levothyroxine 75 mcg tablet take 1 tablet by mouth once daily 90 tablet 1    lidocaine (LIDODERM) 5 % place 1 patch ON THE SKIN EVERY 12 HOURS. REMOVE AND DISCARD PATC...  (REFER TO  "PRESCRIPTION NOTES).      loratadine (CLARITIN) 10 mg tablet take 1 tablet by mouth once daily 90 tablet 3    multivitamin (THERAGRAN) TABS Take 1 tablet by mouth daily       No current facility-administered medications on file prior to visit.      Social History     Tobacco Use    Smoking status: Never    Smokeless tobacco: Never   Vaping Use    Vaping status: Never Used   Substance and Sexual Activity    Alcohol use: Never    Drug use: Never    Sexual activity: Never        Objective   /77 (BP Location: Left arm, Patient Position: Sitting, Cuff Size: Adult)   Pulse 79   Temp 98.1 °F (36.7 °C) (Temporal)   Ht 4' 9\" (1.448 m)   Wt 64.4 kg (142 lb)   BMI 30.73 kg/m²      Physical Exam  Vitals and nursing note reviewed.   Constitutional:       General: She is awake. She is not in acute distress.     Appearance: Normal appearance. She is well-developed, well-groomed and overweight. She is not ill-appearing.   HENT:      Head: Normocephalic and atraumatic.   Eyes:      General: No scleral icterus.     Conjunctiva/sclera: Conjunctivae normal.   Cardiovascular:      Rate and Rhythm: Normal rate and regular rhythm.      Pulses: Normal pulses.      Heart sounds: Normal heart sounds. No murmur heard.  Pulmonary:      Effort: Pulmonary effort is normal. No respiratory distress.      Breath sounds: Normal breath sounds. No wheezing, rhonchi or rales.   Musculoskeletal:         General: Normal range of motion.      Right lower leg: No edema.      Left lower leg: No edema.   Skin:     General: Skin is warm.      Capillary Refill: Capillary refill takes less than 2 seconds.      Coloration: Skin is not jaundiced.      Findings: No rash.      Comments: Area of thickened callused skin right outer foot, No erythema, edema, or ecchymosis. No fluctuance. No induration. Non-tender.   Neurological:      General: No focal deficit present.      Mental Status: She is alert and oriented to person, place, and time. Mental status " is at baseline.      Motor: Motor function is intact.      Coordination: Coordination is intact.      Gait: Gait is intact.   Psychiatric:         Attention and Perception: Attention normal.         Mood and Affect: Mood and affect normal.         Speech: Speech normal.         Behavior: Behavior normal. Behavior is cooperative.       Administrative Statements   I have spent a total time of 30 minutes in caring for this patient on the day of the visit/encounter including Prognosis, Risks and benefits of tx options, Instructions for management, Patient and family education, Importance of tx compliance, Risk factor reductions, Impressions, Counseling / Coordination of care, Reviewing / ordering tests, medicine, procedures  , and Obtaining or reviewing history  . Topics discussed with the patient / family include symptom assessment and management, medication review, and supportive listening.

## 2024-12-21 DIAGNOSIS — J30.89 CHRONIC NONSEASONAL ALLERGIC RHINITIS DUE TO POLLEN: ICD-10-CM

## 2024-12-23 RX ORDER — LORATADINE 10 MG/1
10 TABLET ORAL DAILY
Qty: 90 TABLET | Refills: 3 | Status: SHIPPED | OUTPATIENT
Start: 2024-12-23

## 2025-02-05 ENCOUNTER — OFFICE VISIT (OUTPATIENT)
Dept: MULTI SPECIALTY CLINIC | Facility: CLINIC | Age: 40
End: 2025-02-05

## 2025-02-05 VITALS
TEMPERATURE: 98.2 F | HEART RATE: 76 BPM | SYSTOLIC BLOOD PRESSURE: 118 MMHG | HEIGHT: 57 IN | BODY MASS INDEX: 31.5 KG/M2 | DIASTOLIC BLOOD PRESSURE: 79 MMHG | WEIGHT: 146 LBS

## 2025-02-05 DIAGNOSIS — E55.9 VITAMIN D INSUFFICIENCY: ICD-10-CM

## 2025-02-05 DIAGNOSIS — E66.9 OBESITY (BMI 30-39.9): ICD-10-CM

## 2025-02-05 DIAGNOSIS — E03.8 CENTRAL HYPOTHYROIDISM: Primary | ICD-10-CM

## 2025-02-05 PROCEDURE — 99214 OFFICE O/P EST MOD 30 MIN: CPT | Performed by: INTERNAL MEDICINE

## 2025-02-05 NOTE — PROGRESS NOTES
Dustin Haas 39 y.o. female MRN: [id number]    Encounter: [id number]      Assessment & Plan     Assessment:  This is a 39 y.o.-year-old female with central hypothyroidism.    Plan:  1.  Central hypothyroidism: No recent thyroid lab work.  Previous lab work shows stable thyroid levels.  At this time she will continue with levothyroxine 75 mcg daily.  His concerns with weight gain which I do have a little concern that her thyroid is a contributing factor, but we could possibly tweak her thyroid medication based on levels.  I asked to get thyroid lab work completed at this time.    2.  Obesity: Little concern at this time that this is related to her thyroid, but did order lab work as noted above.  At this time we will also check pituitary function and cortisol levels as she does have central hypothyroidism.  If lab work comes back normal, would recommend discussing with PCP for treatment options.    CC: Hypothyroidism follow-up    History of Present Illness     HPI:  [facility] : [id number]    Dustin Haas is a 39 y.o. female with hypothyroidism diagnosed in 2020 with past medical history including developmental delay, epilepsy, and migraines.  She is [place]-speaking, and presents with her mother for routine follow-up.  In the past her TSH and free T4 have been low which is suggestive of central hypothyroidism with unremarkable MRI in 2017. Continues on levothyroxine 75 mcg daily, regularly and properly without side effects.     Has concerns with increased weight gain over the past year.  Has gained 3 pounds since last office visit.  At this time there is no concerns with fatigue.  Does have the occasional abdominal pain with nausea.  States this is associated typically with her menses.  Menses are regular and occur on a monthly basis.  Denies changes in level of concentration or ability to fall or stay asleep. Denies jitteriness or tremors.  Denies tachycardia.  Denies constipation or hyperdefecation.   Denies frequent headaches.  Denies temperature intolerances.  Denies any change in vision.  Has not been experiencing any weakness in extremities.     Ultrasound thyroid normal from March 2024 no nodules identified.  Denies any neck discomfort or difficulty swallowing.     Family history in mother's aunt of thyroid cancer. Had a history of head trauma at age 5 with seizures starting after.      For history of vitamin D deficiency, she is taking vitamin D 50,000 IU every week.    Review of Systems   Constitutional:  Positive for unexpected weight change. Negative for activity change, appetite change, diaphoresis and fatigue.   HENT:  Negative for sore throat, trouble swallowing and voice change.    Eyes:  Negative for visual disturbance.   Respiratory:  Negative for chest tightness and shortness of breath.    Cardiovascular:  Negative for chest pain, palpitations and leg swelling.   Gastrointestinal:  Positive for abdominal pain and nausea. Negative for constipation and diarrhea.        See HPI   Endocrine: Negative for cold intolerance, heat intolerance, polydipsia, polyphagia and polyuria.   Skin:  Negative for rash.   Neurological:  Negative for dizziness, tremors, light-headedness, numbness and headaches.   Hematological:  Negative for adenopathy.   Psychiatric/Behavioral:  Negative for dysphoric mood and sleep disturbance. The patient is not nervous/anxious.        Historical Information   Past Medical History:   Diagnosis Date   • Learning disability     Mental capacity of an 9 year old    • Seizure (HCC)    • Thyroid condition      Past Surgical History:   Procedure Laterality Date   • NO PAST SURGERIES       Social History   Social History     Substance and Sexual Activity   Alcohol Use Never     Social History     Substance and Sexual Activity   Drug Use Never     Social History     Tobacco Use   Smoking Status Never   Smokeless Tobacco Never     Family History:   Family History   Problem Relation Age of  "Onset   • Migraines Mother    • No Known Problems Father        Meds/Allergies   Current Outpatient Medications   Medication Sig Dispense Refill   • calamine lotion Apply topically as needed for itching for up to 14 days 180 mL 0   • carbamazepine (CARBATROL) 100 MG 12 hr capsule take 1 tablet by mouth every morning 90 capsule 3   • ergocalciferol (VITAMIN D2) 50,000 units Take 1 capsule (50,000 Units total) by mouth once a week 4 capsule 0   • folic acid (FOLVITE) 1 mg tablet take 1 tablet by mouth once daily 30 tablet 11   • ibuprofen (MOTRIN) 600 mg tablet take 1 tablet by mouth every 6 hours if needed for MILD PAIN ( PAIN SCALE 1-3 )     • levothyroxine 75 mcg tablet take 1 tablet by mouth once daily 90 tablet 1   • lidocaine (LIDODERM) 5 % place 1 patch ON THE SKIN EVERY 12 HOURS. REMOVE AND DISCARD PATC...  (REFER TO PRESCRIPTION NOTES).     • loratadine (CLARITIN) 10 mg tablet take 1 tablet by mouth once daily 90 tablet 3   • methocarbamol (ROBAXIN) 750 mg tablet Take 750 mg by mouth 4 (four) times a day as needed     • multivitamin (THERAGRAN) TABS Take 1 tablet by mouth daily     • urea 10 % lotion Apply topically as needed for dry skin Apply daily to callus as needed 228 g 1     No current facility-administered medications for this visit.     Allergies   Allergen Reactions   • Contrast [Iodinated Contrast Media]    • Dust Mite Extract    • Nickel    • Penicillins    • Pollen Extract    • Short Ragweed Pollen Ext        Objective   Vitals: Blood pressure 118/79, pulse 76, temperature 98.2 °F (36.8 °C), temperature source Temporal, height 4' 9\" (1.448 m), weight 66.2 kg (146 lb).    Physical Exam  Vitals and nursing note reviewed.   Constitutional:       General: She is not in acute distress.     Appearance: Normal appearance. She is not diaphoretic.   HENT:      Head: Normocephalic and atraumatic.   Eyes:      General: No scleral icterus.     Extraocular Movements: Extraocular movements intact.      " "Conjunctiva/sclera: Conjunctivae normal.      Pupils: Pupils are equal, round, and reactive to light.   Neck:      Thyroid: No thyroid mass, thyromegaly or thyroid tenderness.   Cardiovascular:      Rate and Rhythm: Normal rate and regular rhythm.      Heart sounds: No murmur heard.  Pulmonary:      Effort: Pulmonary effort is normal. No respiratory distress.      Breath sounds: Normal breath sounds. No wheezing.   Musculoskeletal:      Cervical back: Normal range of motion.      Right lower leg: No edema.      Left lower leg: No edema.   Lymphadenopathy:      Cervical: No cervical adenopathy.   Neurological:      Mental Status: She is alert. Mental status is at baseline.      Sensory: No sensory deficit.      Motor: Tremor present.      Gait: Gait normal.   Psychiatric:         Mood and Affect: Mood normal.         Behavior: Behavior normal.         Thought Content: Thought content normal.         The history was obtained from the review of the chart, patient and family.    Lab Results:   Lab Results   Component Value Date/Time    TSH 3RD GENERATON 0.174 (L) 02/07/2024 11:16 AM    Free T4 0.83 08/07/2024 12:09 PM    Free T4 0.81 02/07/2024 11:16 AM       Imaging Studies:   Results for orders placed during the hospital encounter of 03/29/24    US thyroid    Impression  Normal exam.        Reference: ACR Thyroid Imaging, Reporting and Data System (TI-RADS): White Paper of the ACR TI-RADS Committee. J AM Hansa Radiol 2017;14:587-595. Additional recommendations based on American Thyroid Association 2015 guidelines.      Workstation performed: [id number]      Results Review Statement: I reviewed radiology reports from this admission including: Ultrasound(s).    Portions of the record may have been created with voice recognition software. Occasional wrong word or \"sound a like\" substitutions may have occurred due to the inherent limitations of voice recognition software. Read the chart carefully and recognize, using " context, where substitutions have occurred.

## 2025-02-05 NOTE — PATIENT INSTRUCTIONS
Get lab work completed at this time.  Will call with results.    Continue levothyroxine 75 mcg at this time.    Follow-up in 6 months with labwork completed prior to visit.

## 2025-02-07 ENCOUNTER — APPOINTMENT (OUTPATIENT)
Dept: LAB | Facility: CLINIC | Age: 40
End: 2025-02-07
Payer: COMMERCIAL

## 2025-02-07 DIAGNOSIS — E55.9 VITAMIN D INSUFFICIENCY: ICD-10-CM

## 2025-02-07 DIAGNOSIS — E66.9 OBESITY (BMI 30-39.9): ICD-10-CM

## 2025-02-07 DIAGNOSIS — E03.8 CENTRAL HYPOTHYROIDISM: ICD-10-CM

## 2025-02-07 LAB
25(OH)D3 SERPL-MCNC: 22.1 NG/ML (ref 30–100)
CORTIS AM PEAK SERPL-MCNC: 8.4 UG/DL (ref 6.7–22.6)
T4 FREE SERPL-MCNC: 0.54 NG/DL (ref 0.61–1.12)
TSH SERPL DL<=0.05 MIU/L-ACNC: 0.26 UIU/ML (ref 0.45–4.5)

## 2025-02-07 PROCEDURE — 36415 COLL VENOUS BLD VENIPUNCTURE: CPT

## 2025-02-07 PROCEDURE — 82627 DEHYDROEPIANDROSTERONE: CPT

## 2025-02-07 PROCEDURE — 82024 ASSAY OF ACTH: CPT

## 2025-02-07 PROCEDURE — 82533 TOTAL CORTISOL: CPT

## 2025-02-07 PROCEDURE — 82306 VITAMIN D 25 HYDROXY: CPT

## 2025-02-07 PROCEDURE — 84443 ASSAY THYROID STIM HORMONE: CPT

## 2025-02-07 PROCEDURE — 84439 ASSAY OF FREE THYROXINE: CPT

## 2025-02-07 PROCEDURE — 84305 ASSAY OF SOMATOMEDIN: CPT

## 2025-02-07 PROCEDURE — 84403 ASSAY OF TOTAL TESTOSTERONE: CPT

## 2025-02-07 PROCEDURE — 84402 ASSAY OF FREE TESTOSTERONE: CPT

## 2025-02-08 LAB
ACTH PLAS-MCNC: 20.2 PG/ML (ref 7.2–63.3)
DHEA-S SERPL-MCNC: 171 UG/DL (ref 57.3–279.2)
TESTOST FREE SERPL-MCNC: 1.4 PG/ML (ref 0–4.2)
TESTOST SERPL-MCNC: 22 NG/DL (ref 8–60)

## 2025-02-10 LAB — IGF-I SERPL-MCNC: 173 NG/ML (ref 79–259)

## 2025-02-11 ENCOUNTER — RESULTS FOLLOW-UP (OUTPATIENT)
Dept: ENDOCRINOLOGY | Facility: HOSPITAL | Age: 40
End: 2025-02-11

## 2025-02-11 DIAGNOSIS — E03.8 CENTRAL HYPOTHYROIDISM: ICD-10-CM

## 2025-02-11 DIAGNOSIS — E55.9 VITAMIN D INSUFFICIENCY: ICD-10-CM

## 2025-02-11 RX ORDER — LEVOTHYROXINE SODIUM 88 UG/1
88 TABLET ORAL DAILY
Qty: 90 TABLET | Refills: 1 | Status: SHIPPED | OUTPATIENT
Start: 2025-02-11

## 2025-02-11 RX ORDER — ERGOCALCIFEROL 1.25 MG/1
50000 CAPSULE, LIQUID FILLED ORAL WEEKLY
Qty: 12 CAPSULE | Refills: 1 | Status: SHIPPED | OUTPATIENT
Start: 2025-02-11

## 2025-02-20 ENCOUNTER — OFFICE VISIT (OUTPATIENT)
Dept: NEUROLOGY | Facility: CLINIC | Age: 40
End: 2025-02-20
Payer: COMMERCIAL

## 2025-02-20 VITALS
BODY MASS INDEX: 31.59 KG/M2 | WEIGHT: 146.4 LBS | HEART RATE: 71 BPM | SYSTOLIC BLOOD PRESSURE: 110 MMHG | HEIGHT: 57 IN | DIASTOLIC BLOOD PRESSURE: 80 MMHG

## 2025-02-20 DIAGNOSIS — G40.909 NONINTRACTABLE EPILEPSY WITHOUT STATUS EPILEPTICUS, UNSPECIFIED EPILEPSY TYPE (HCC): Primary | ICD-10-CM

## 2025-02-20 PROCEDURE — 99214 OFFICE O/P EST MOD 30 MIN: CPT | Performed by: STUDENT IN AN ORGANIZED HEALTH CARE EDUCATION/TRAINING PROGRAM

## 2025-02-20 RX ORDER — CARBAMAZEPINE 100 MG/1
100 CAPSULE, EXTENDED RELEASE ORAL EVERY MORNING
Qty: 90 CAPSULE | Refills: 3 | Status: SHIPPED | OUTPATIENT
Start: 2025-02-20

## 2025-02-20 NOTE — PROGRESS NOTES
[facility] -   Follow up appointment    Impression/Plan    Dustin Haas is a 39 y.o. female with a PMH of intellectual disability and focal epilepsy (past EEGs found left centrotemporal epileptiform discharges, but no recurrent seizure for nearly 20 years, and most recent was normal), who is returning to Neurology office for follow up of her seizures. Her neurological exam is unchanged. We spoke about how she is on a subtherapeutic dose of carbamazepine. Mother is too concerned about seizures when stopping the medication to make any more change. Plan outlined below:    #Epilepsy  - carbitrol 100 mg daily  - CBZ, CMP levels pending     - Follow up in 1 year    We discussed the pathophysiology of epilepsy/seizure and seizure safety/precautions including driving restrictions following seizures. We discussed factors that can lower seizure threshold and the side effects of antiepileptic medications.     Diagnoses and all orders for this visit:    Nonintractable epilepsy without status epilepticus, unspecified epilepsy type (HCC)  -     Carbamazepine level, total; Future  -     Comprehensive metabolic panel; Future  -     carbamazepine (CARBATROL) 100 MG 12 hr capsule; Take 1 capsule (100 mg total) by mouth every morning        Subjective    Dustin Haas is a 39 y.o. female with a PMH of intellectual disability and focal epilepsy (past EEGs found left centrotemporal epileptiform discharges, but no recurrent seizure for nearly 20 years, and most recent was normal), who is returning to Neurology office for follow up of her seizures.     For review:     Patient with history focal epilepsy based on prior EEG in 2016 with left centro-temporal epileptiform discharges maintained on Carbatrol 300 mg twice per day for many years. Most recent EEG was normal to determine if medication weaning and discontinuation could be attempted given she has been seizure free for over 20 years. Weaning was started  without recurrent seizures.  Neurologic exam nonfocal except for intellectual disabilities.     She was seen in the office on 2023. Plan at that time was to decrease to 100 mg daily to see how she does with the goal to eventually stop the Carbatrol altogether.     The patient was last seen in clinic on 2023. She was seizure free. While I felt comfortable with stopping the carbatrol altogether, her mother wanted to continue the subtherapeutic dose.     Interval history:    Since last seen, the patient has been doing well. There have been no seizures since the last appointment. The patients AEDs were being tolerated well with no side effects. There is no concern for medication non-adherence.     Current Aeds: Carbatrol 100 mg daily    Special Features  Status epilepticus: No  Self Injury Seizures: No  Precipitating Factors: None     Epilepsy Risk Factors:  Abnormal pregnancy: No  Abnormal birth/: Full term but small for gestational age (4.5lbs)  Abnormal Development: Walking at 1.5 years.  Speech did not develop until 2.5 years, attention deficit, she needed speech therapy  Febrile seizures, simple: No  Febrile seizures, complex: No  CNS infection: No  Mental retardation: Learning disability  Cerebral palsy: No  Head injury (moderate/severe): Yes  CNS neoplasm: No  CNS malformation: No  Neurosurgical procedure: No  Stroke: No  Alcohol abuse: No  Drug abuse: No  Family history Sz/epilepsy: No     Prior AEDs:  Luminal  Tegretol Syrup  Carbamazepine/Carbatrol (transitioned off to minimize the risk of osteoporosis)  Levetiracetam (weight gain, irritability)  Lamotrigine (depression, irritability)     Prior workup:  Imaging:  MRI brain 3/24/2008  [id number] T2 hyperintense focus within the subcortical white matter of the lateral aspect of the left midfrontal lobe (may be seen in migraine headaches)     2017 - MRI brain  No intracranial pathology  Mild left anterior ethmoid and maxillary sinus disease    "  EEGs:  3/21/2008 Dr. Cunha  6-7 Hz PDR  Bifrontal sharp and spike activity  Generalized spike-and-wave activity with frontal predominance     11/4-5/2015 24 hours ambulatory EEG  Normal 24 hours ambulatory EEG  Patient event button of dizziness (twice) shows no change to the awake background EEG.     07/08/2016 >1 hour EEG  Rare presence of focal spike-slow waves over the left central-temporal region     8/30/2022 Routine EEG  Normal EEG    History Reviewed:   The following were reviewed and updated as appropriate: allergies, current medications, past family history, past medical history, past social history, past surgical history, and problem list    ROS:  A 12 system review of system was obtained and otherwise negative except as per HPI.       Objective    /80 (BP Location: Left arm, Patient Position: Sitting, Cuff Size: Standard)   Pulse 71   Ht 4' 9\" (1.448 m)   Wt 66.4 kg (146 lb 6.4 oz)   BMI 31.68 kg/m²      General Exam  General: well developed, no acute distress.  HEENT: mucous membranes moist, anicteric sclera.   Neck: supple, good ROM.  Extremities: no clubbing, cyanosis or edema.   Skin: no rash on visible skin.    Neurological Exam  Mental Status: awake, alert, and fully oriented to person, place, time, and situation. Attention and memory intact. Fund of knowledge is appropriate for age and education.  There is no neglect.    Language: fluency, and comprehension normal.       Cranial Nerves: Pupils equal and reactive to light.  Visual fields full to confrontation. Extraocular motions intact with full versions, normal pursuits and saccades. Facial strength full and symmetric. Hearing intact to voice. Tongue protrudes to midline. Palate elevates symmetrically. Speech clear without notable dysarthria.     Motor: Normal bulk and tone. No pronator drift. Strength is 5/5 proximally and distally in all 4 extremities. No involuntary movements.    Sensory: Sensation intact to light touch distally " in all extremities.    Coordination: Normal finger-to-nose. Normal rapid alternating movements.     Station and gait: Casual and tandem gait normal. Normal Romberg.    Reflexes: Reflexes 2+ throughout and symmetric.      Babka Pantoja MD   [facility]  Diplomate, ABPN Neurology and Clinical Neurophysiology

## 2025-03-25 DIAGNOSIS — G40.909 NONINTRACTABLE EPILEPSY WITHOUT STATUS EPILEPTICUS, UNSPECIFIED EPILEPSY TYPE (HCC): ICD-10-CM

## 2025-03-26 ENCOUNTER — VBI (OUTPATIENT)
Dept: ADMINISTRATIVE | Facility: OTHER | Age: 40
End: 2025-03-26

## 2025-03-26 RX ORDER — FOLIC ACID 1 MG/1
1000 TABLET ORAL DAILY
Qty: 30 TABLET | Refills: 5 | Status: SHIPPED | OUTPATIENT
Start: 2025-03-26

## 2025-03-26 NOTE — TELEPHONE ENCOUNTER
03/26/25 10:48 AM     Chart reviewed for Pap Smear (HPV) aka Cervical Cancer Screening ; nothing is submitted to the patient's insurance at this time.     Colby Mclain MA   [facility]

## 2025-06-18 ENCOUNTER — OFFICE VISIT (OUTPATIENT)
Dept: INTERNAL MEDICINE CLINIC | Facility: CLINIC | Age: 40
End: 2025-06-18

## 2025-06-18 VITALS
HEIGHT: 57 IN | DIASTOLIC BLOOD PRESSURE: 78 MMHG | SYSTOLIC BLOOD PRESSURE: 121 MMHG | TEMPERATURE: 98.1 F | HEART RATE: 72 BPM | BODY MASS INDEX: 27.83 KG/M2 | WEIGHT: 129 LBS

## 2025-06-18 DIAGNOSIS — F81.9 COGNITIVE DEVELOPMENTAL DELAY: ICD-10-CM

## 2025-06-18 DIAGNOSIS — L30.9 DERMATITIS: ICD-10-CM

## 2025-06-18 DIAGNOSIS — E78.49 OTHER HYPERLIPIDEMIA: ICD-10-CM

## 2025-06-18 DIAGNOSIS — J30.89 CHRONIC NONSEASONAL ALLERGIC RHINITIS DUE TO POLLEN: ICD-10-CM

## 2025-06-18 DIAGNOSIS — G43.109 VERTIGINOUS MIGRAINE: ICD-10-CM

## 2025-06-18 DIAGNOSIS — G40.909 NONINTRACTABLE EPILEPSY WITHOUT STATUS EPILEPTICUS, UNSPECIFIED EPILEPSY TYPE (HCC): ICD-10-CM

## 2025-06-18 DIAGNOSIS — E55.9 VITAMIN D INSUFFICIENCY: ICD-10-CM

## 2025-06-18 DIAGNOSIS — J30.2 SEASONAL ALLERGIC RHINITIS, UNSPECIFIED TRIGGER: ICD-10-CM

## 2025-06-18 DIAGNOSIS — E03.8 CENTRAL HYPOTHYROIDISM: ICD-10-CM

## 2025-06-18 DIAGNOSIS — Z00.00 WELL ADULT EXAM: Primary | ICD-10-CM

## 2025-06-18 PROCEDURE — 99214 OFFICE O/P EST MOD 30 MIN: CPT | Performed by: FAMILY MEDICINE

## 2025-06-18 PROCEDURE — 99395 PREV VISIT EST AGE 18-39: CPT | Performed by: FAMILY MEDICINE

## 2025-06-18 RX ORDER — MOMETASONE FUROATE 1 MG/G
CREAM TOPICAL
Qty: 45 G | Refills: 0 | Status: SHIPPED | OUTPATIENT
Start: 2025-06-18

## 2025-06-18 RX ORDER — LEVOTHYROXINE SODIUM 88 UG/1
88 TABLET ORAL DAILY
Qty: 90 TABLET | Refills: 3 | Status: SHIPPED | OUTPATIENT
Start: 2025-06-18

## 2025-06-18 RX ORDER — LORATADINE 10 MG/1
10 TABLET ORAL DAILY
Qty: 90 TABLET | Refills: 3 | Status: SHIPPED | OUTPATIENT
Start: 2025-06-18

## 2025-06-18 RX ORDER — DIPHENOXYLATE HYDROCHLORIDE AND ATROPINE SULFATE 2.5; .025 MG/1; MG/1
1 TABLET ORAL DAILY
Qty: 90 TABLET | Refills: 3 | Status: SHIPPED | OUTPATIENT
Start: 2025-06-18

## 2025-06-18 NOTE — PROGRESS NOTES
Adult Annual Physical  Name: Dustin Haas      : 1985      MRN: [id number]  Encounter Provider: Chrystal Peña MD  Encounter Date: 2025   Encounter department: [facility] [place]    :  Assessment & Plan  Central hypothyroidism  Patient has lost 17 lbs diet exercise over the last 6 months she feels great , due for TSH  await results  Orders:    TSH, 3rd generation with Free T4 reflex; Future    CBC and differential; Future    levothyroxine 88 mcg tablet; Take 1 tablet (88 mcg total) by mouth daily    multivitamin (THERAGRAN) TABS; Take 1 tablet by mouth daily    Cognitive developmental delay    Orders:    multivitamin (THERAGRAN) TABS; Take 1 tablet by mouth daily    Vitamin D insufficiency  Patient completed course of vit D 50,000 she is taking multivitamin , due for follow up labs   Orders:    Vitamin D 25 hydroxy; Future    Well adult exam  Patient is due for eye exam , and fasting labs        Other hyperlipidemia    Orders:    Comprehensive metabolic panel; Future    Lipid panel; Future    Seasonal allergic rhinitis, unspecified trigger  Avoid triggers as best able , claritin daily        Vertiginous migraine         Nonintractable epilepsy without status epilepticus, unspecified epilepsy type (HCC)  Patient follows with neurology        Chronic nonseasonal allergic rhinitis due to pollen    Orders:    loratadine (CLARITIN) 10 mg tablet; Take 1 tablet (10 mg total) by mouth daily    Dermatitis    Orders:    mometasone (ELOCON) 0.1 % cream; Apply lightly bid to affected area x 5 days        Annual Physical Plan       History of Present Illness     Adult Annual Physical patient here for well adult exam interpretor # [id number] present during office visit , here with Mom   PMH hypothyroid ,cognitive ,delay  speech language delay due hearing loss developmental delay , epilepsy  Patient has lost 17 lbs since 2025   Vision exam years ago   Dental exams q 6 months  "  Hearing normal   Diet protein drinks 3 x per day also eggs in am she eats meat , chicken , fruits 2 , 2 veggies , water 3 bottles per day , caffeine coffee 1 in am , - soda ,  rare juice   Exercise 4-5 x per week 30 minutes dances since January 2025   -Smoking -ETOH   Review of Systems      Objective   /78 (BP Location: Left arm, Patient Position: Sitting, Cuff Size: Adult)   Pulse 72   Temp 98.1 °F (36.7 °C) (Temporal)   Ht 4' 9\" (1.448 m)   Wt 58.5 kg (129 lb)   BMI 27.92 kg/m²     Physical Exam  Vitals and nursing note reviewed.   Constitutional:       General: She is not in acute distress.     Appearance: She is well-developed.      Comments: Skin with good color turgor , well hydrated ,no distress noted     HENT:      Head: Normocephalic and atraumatic.      Right Ear: No decreased hearing noted. No middle ear effusion. There is no impacted cerumen.      Left Ear: No decreased hearing noted.  No middle ear effusion. There is no impacted cerumen.      Mouth/Throat:      Mouth: Mucous membranes are moist.      Pharynx: Oropharynx is clear.     Eyes:      Conjunctiva/sclera: Conjunctivae normal.     Neck:      Thyroid: No thyromegaly.     Cardiovascular:      Rate and Rhythm: Normal rate and regular rhythm.      Heart sounds: Normal heart sounds. No murmur heard.  Pulmonary:      Effort: Pulmonary effort is normal. No respiratory distress.      Breath sounds: Normal breath sounds.   Abdominal:      General: Bowel sounds are normal.      Palpations: Abdomen is soft.      Tenderness: There is no abdominal tenderness. There is no right CVA tenderness or left CVA tenderness.     Musculoskeletal:         General: No swelling.      Cervical back: Neck supple.   Lymphadenopathy:      Cervical:      Right cervical: No superficial cervical adenopathy.     Left cervical: No superficial cervical adenopathy.     Skin:     General: Skin is warm and dry.      Capillary Refill: Capillary refill takes less than 2 " seconds.     Neurological:      Mental Status: She is alert.      Comments: Non focal exam   Psychiatric:         Attention and Perception: Attention normal.         Mood and Affect: Mood normal.         Speech: Speech normal.         Behavior: Behavior normal.         Thought Content: Thought content normal.

## 2025-06-18 NOTE — ASSESSMENT & PLAN NOTE
Patient has lost 17 lbs diet exercise over the last 6 months she feels great , due for TSH 8/7 await results  Orders:    TSH, 3rd generation with Free T4 reflex; Future    CBC and differential; Future    levothyroxine 88 mcg tablet; Take 1 tablet (88 mcg total) by mouth daily    multivitamin (THERAGRAN) TABS; Take 1 tablet by mouth daily

## 2025-06-18 NOTE — ASSESSMENT & PLAN NOTE
Patient completed course of vit D 50,000 she is taking multivitamin , due for follow up labs   Orders:    Vitamin D 25 hydroxy; Future